# Patient Record
Sex: FEMALE | Race: WHITE | HISPANIC OR LATINO | Employment: OTHER | URBAN - METROPOLITAN AREA
[De-identification: names, ages, dates, MRNs, and addresses within clinical notes are randomized per-mention and may not be internally consistent; named-entity substitution may affect disease eponyms.]

---

## 2020-09-11 ENCOUNTER — OFFICE VISIT (OUTPATIENT)
Dept: INTERNAL MEDICINE CLINIC | Facility: CLINIC | Age: 65
End: 2020-09-11
Payer: COMMERCIAL

## 2020-09-11 VITALS
WEIGHT: 167 LBS | HEIGHT: 61 IN | SYSTOLIC BLOOD PRESSURE: 118 MMHG | HEART RATE: 60 BPM | DIASTOLIC BLOOD PRESSURE: 78 MMHG | TEMPERATURE: 97.3 F | BODY MASS INDEX: 31.53 KG/M2

## 2020-09-11 DIAGNOSIS — E78.2 MIXED HYPERLIPIDEMIA: ICD-10-CM

## 2020-09-11 DIAGNOSIS — I10 HYPERTENSION, ESSENTIAL: ICD-10-CM

## 2020-09-11 DIAGNOSIS — L40.59 OTHER PSORIATIC ARTHROPATHY (HCC): ICD-10-CM

## 2020-09-11 DIAGNOSIS — E08.21 DIABETES MELLITUS DUE TO UNDERLYING CONDITION WITH DIABETIC NEPHROPATHY, WITHOUT LONG-TERM CURRENT USE OF INSULIN (HCC): ICD-10-CM

## 2020-09-11 DIAGNOSIS — F34.1 DYSTHYMIC DISORDER: ICD-10-CM

## 2020-09-11 DIAGNOSIS — K21.9 GASTROESOPHAGEAL REFLUX DISEASE WITHOUT ESOPHAGITIS: ICD-10-CM

## 2020-09-11 DIAGNOSIS — M05.40 RHEUMATOID MYOPATHY WITH RHEUMATOID ARTHRITIS (HCC): ICD-10-CM

## 2020-09-11 DIAGNOSIS — E11.65 UNCONTROLLED TYPE 2 DIABETES MELLITUS WITH HYPERGLYCEMIA (HCC): Primary | ICD-10-CM

## 2020-09-11 PROCEDURE — 99214 OFFICE O/P EST MOD 30 MIN: CPT | Performed by: INTERNAL MEDICINE

## 2020-09-11 RX ORDER — ATORVASTATIN CALCIUM 10 MG/1
TABLET, FILM COATED ORAL
COMMUNITY
Start: 2020-08-12 | End: 2020-09-11

## 2020-09-11 RX ORDER — LINAGLIPTIN 5 MG/1
TABLET, FILM COATED ORAL
COMMUNITY
Start: 2020-08-11 | End: 2020-10-22

## 2020-09-11 RX ORDER — HYDROCHLOROTHIAZIDE 25 MG/1
TABLET ORAL
COMMUNITY
Start: 2020-09-04 | End: 2020-12-03

## 2020-09-11 RX ORDER — GLIMEPIRIDE 1 MG/1
TABLET ORAL
COMMUNITY
Start: 2020-09-04 | End: 2020-10-22

## 2020-09-11 RX ORDER — OMEPRAZOLE 20 MG/1
20 CAPSULE, DELAYED RELEASE ORAL DAILY
COMMUNITY
End: 2020-10-22

## 2020-09-11 RX ORDER — ROSUVASTATIN CALCIUM 5 MG/1
5 TABLET, COATED ORAL DAILY
Qty: 30 TABLET | Refills: 1 | Status: SHIPPED | OUTPATIENT
Start: 2020-09-11 | End: 2020-10-22

## 2020-09-11 RX ORDER — OLMESARTAN MEDOXOMIL 40 MG/1
40 TABLET ORAL DAILY
COMMUNITY
Start: 2020-07-13 | End: 2021-01-14 | Stop reason: SDUPTHER

## 2020-09-11 NOTE — PROGRESS NOTES
Assessment/Plan:    BMI Counseling: Body mass index is 31 55 kg/m²  The BMI is above normal  Nutrition recommendations include decreasing portion sizes, encouraging healthy choices of fruits and vegetables and decreasing fast food intake  Exercise recommendations include moderate physical activity 150 minutes/week  1  Uncontrolled type 2 diabetes mellitus with hyperglycemia ([facility])  -     Comprehensive metabolic panel; Future    2  Diabetes mellitus due to underlying condition with diabetic nephropathy, without long-term current use of insulin ([facility] )    3  Mixed hyperlipidemia  -     rosuvastatin (CRESTOR) 5 mg tablet; Take 1 tablet (5 mg total) by mouth daily  -     Comprehensive metabolic panel; Future    4  Hypertension, essential    5  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[facility] [address] )    6  Gastroesophageal reflux disease without esophagitis    7  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[facility] [address] [address] )    8  Dysthymic disorder           Subjective:      Patient ID: Valentin Gamez is a 71 y o  female  Follow-up multiple medical problems to ensure the stable on current medications, blood test done on 09/04/2020-discussed with her      The following portions of the patient's history were reviewed and updated as appropriate: She  has a past medical history of Diabetes mellitus due to underlying condition with diabetic nephropathy ([address] ), Dysthymic disorder, Gastroesophageal reflux disease, Hyperlipidemia, Hypertension, essential, Obstructive sleep apnea (adult) (pediatric), Osteoporosis, 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[facility] [address] ), 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[address] ), Right knee pain, Sleep apnea, Tietze's disease, and Type II diabetes mellitus, uncontrolled ([address] )    She   Patient Active Problem List    Diagnosis Date Noted    Type II diabetes mellitus, uncontrolled ([address] )     Sleep apnea     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[address] )     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[address] )     Obstructive sleep apnea (adult) (pediatric)     Hypertension, essential     Hyperlipidemia     Gastroesophageal reflux disease     Dysthymic disorder     Osteoporosis     Diabetes mellitus due to underlying condition with diabetic nephropathy ([facility] [address] )      She  has a past surgical history that includes Hysterectomy and Cholecystectomy  Her family history includes Diabetes in her mother; Stroke in her father  She  reports that she has never smoked  She has never used smokeless tobacco  She reports that she does not drink alcohol or use drugs  Current Outpatient Medications   Medication Sig Dispense Refill    glimepiride (AMARYL) 1 mg tablet TK 1 T PO D      hydrochlorothiazide (HYDRODIURIL) 25 mg tablet TK 1 T PO QD      olmesartan (BENICAR) 40 mg tablet TK 1/2 T PO BID      Tradjenta 5 MG TABS TK 1 T PO QD      omeprazole (PriLOSEC) 20 mg delayed release capsule Take 20 mg by mouth daily      rosuvastatin (CRESTOR) 5 mg tablet Take 1 tablet (5 mg total) by mouth daily 30 tablet 1     No current facility-administered medications for this visit  Current Outpatient Medications on File Prior to Visit   Medication Sig    glimepiride (AMARYL) 1 mg tablet TK 1 T PO D    hydrochlorothiazide (HYDRODIURIL) 25 mg tablet TK 1 T PO QD    olmesartan (BENICAR) 40 mg tablet TK 1/2 T PO BID    Tradjenta 5 MG TABS TK 1 T PO QD    omeprazole (PriLOSEC) 20 mg delayed release capsule Take 20 mg by mouth daily    [DISCONTINUED] atorvastatin (LIPITOR) 10 mg tablet TK 1 T PO QD     No current facility-administered medications on file prior to visit  She is allergic to atorvastatin and penicillins       Review of Systems   Constitutional: Negative for chills and fever  HENT: Negative for congestion, ear pain and sore throat  Eyes: Negative for pain  Respiratory: Negative for cough and shortness of breath  Cardiovascular: Negative for chest pain and leg swelling     Gastrointestinal: Negative for abdominal pain, nausea and vomiting  Endocrine: Negative for polyuria  Genitourinary: Negative for difficulty urinating, frequency and urgency  Musculoskeletal: Positive for arthralgias and back pain  Skin: Negative for rash  Neurological: Negative for weakness and headaches  Psychiatric/Behavioral: Negative for sleep disturbance  The patient is not nervous/anxious  Objective:      /78 (BP Location: Right arm, Patient Position: Sitting)   Pulse 60   Temp (!) 97 3 °F (36 3 °C) (Skin)   Ht 5' 1" (1 549 m)   Wt 75 8 kg (167 lb)   BMI 31 55 kg/m²     Recent Results (from the past 1344 hour(s))   HEMOGLOBIN A1C    Collection Time: 08/03/20 10:31 AM   Result Value Ref Range    Hemoglobin A1C 8 5 (H) <5 7 %    eAG, EST AVG Glucose 197 (H) <154 mg/dL        Physical Exam  Constitutional:       Appearance: Normal appearance  HENT:      Head: Normocephalic  Right Ear: Tympanic membrane, ear canal and external ear normal       Left Ear: Tympanic membrane, ear canal and external ear normal       Nose: Nose normal  No congestion  Mouth/Throat:      Mouth: Mucous membranes are moist       Pharynx: Oropharynx is clear  No oropharyngeal exudate or posterior oropharyngeal erythema  Eyes:      Extraocular Movements: Extraocular movements intact  Conjunctiva/sclera: Conjunctivae normal       Pupils: Pupils are equal, round, and reactive to light  Neck:      Musculoskeletal: Normal range of motion and neck supple  Cardiovascular:      Rate and Rhythm: Normal rate and regular rhythm  Heart sounds: Normal heart sounds  No murmur  Pulmonary:      Effort: Pulmonary effort is normal       Breath sounds: Normal breath sounds  No wheezing or rales  Abdominal:      General: Bowel sounds are normal  There is no distension  Palpations: Abdomen is soft  Tenderness: There is no abdominal tenderness  Musculoskeletal: Normal range of motion  Right lower leg: No edema        Left lower leg: No edema    Lymphadenopathy:      Cervical: No cervical adenopathy  Skin:     General: Skin is warm  Neurological:      General: No focal deficit present  Mental Status: She is alert and oriented to person, place, and time

## 2020-09-22 ENCOUNTER — TELEPHONE (OUTPATIENT)
Dept: INTERNAL MEDICINE CLINIC | Facility: CLINIC | Age: 65
End: 2020-09-22

## 2020-09-22 DIAGNOSIS — H65.00 ACUTE SEROUS OTITIS MEDIA, RECURRENCE NOT SPECIFIED, UNSPECIFIED LATERALITY: Primary | ICD-10-CM

## 2020-09-22 RX ORDER — AZITHROMYCIN 250 MG/1
TABLET, FILM COATED ORAL
Qty: 6 TABLET | Refills: 0 | Status: SHIPPED | OUTPATIENT
Start: 2020-09-22 | End: 2020-09-27

## 2020-09-22 NOTE — TELEPHONE ENCOUNTER
Pt c/o Right ear pain since yesterday, no fever, no congestion  She states she has no transportation to come in to the office for an appt  She would like to get something called in to the pharmacy like and antibiotic or pain medication

## 2020-10-22 ENCOUNTER — OFFICE VISIT (OUTPATIENT)
Dept: INTERNAL MEDICINE CLINIC | Facility: CLINIC | Age: 65
End: 2020-10-22
Payer: COMMERCIAL

## 2020-10-22 VITALS
WEIGHT: 166 LBS | OXYGEN SATURATION: 97 % | TEMPERATURE: 97.8 F | HEIGHT: 61 IN | DIASTOLIC BLOOD PRESSURE: 60 MMHG | HEART RATE: 67 BPM | SYSTOLIC BLOOD PRESSURE: 110 MMHG | BODY MASS INDEX: 31.34 KG/M2

## 2020-10-22 DIAGNOSIS — L40.59 OTHER PSORIATIC ARTHROPATHY (HCC): ICD-10-CM

## 2020-10-22 DIAGNOSIS — I10 HYPERTENSION, ESSENTIAL: ICD-10-CM

## 2020-10-22 DIAGNOSIS — E78.2 MIXED HYPERLIPIDEMIA: ICD-10-CM

## 2020-10-22 DIAGNOSIS — E08.21 DIABETES MELLITUS DUE TO UNDERLYING CONDITION WITH DIABETIC NEPHROPATHY, WITHOUT LONG-TERM CURRENT USE OF INSULIN (HCC): ICD-10-CM

## 2020-10-22 DIAGNOSIS — E11.65 UNCONTROLLED TYPE 2 DIABETES MELLITUS WITH HYPERGLYCEMIA (HCC): Primary | ICD-10-CM

## 2020-10-22 PROCEDURE — 99214 OFFICE O/P EST MOD 30 MIN: CPT | Performed by: INTERNAL MEDICINE

## 2020-11-28 LAB
CREAT ?TM UR-SCNC: 334 UMOL/L
EXT MICROALBUMIN URINE RANDOM: 4.8
HBA1C MFR BLD HPLC: 7.3 %
MICROALBUMIN/CREAT UR: 14.4 MG/G{CREAT}

## 2020-12-03 ENCOUNTER — OFFICE VISIT (OUTPATIENT)
Dept: INTERNAL MEDICINE CLINIC | Facility: CLINIC | Age: 65
End: 2020-12-03
Payer: COMMERCIAL

## 2020-12-03 VITALS
BODY MASS INDEX: 30.21 KG/M2 | HEART RATE: 61 BPM | SYSTOLIC BLOOD PRESSURE: 122 MMHG | HEIGHT: 61 IN | OXYGEN SATURATION: 97 % | TEMPERATURE: 98 F | DIASTOLIC BLOOD PRESSURE: 78 MMHG | WEIGHT: 160 LBS

## 2020-12-03 DIAGNOSIS — E11.65 UNCONTROLLED TYPE 2 DIABETES MELLITUS WITH HYPERGLYCEMIA (HCC): Primary | ICD-10-CM

## 2020-12-03 DIAGNOSIS — E78.2 MIXED HYPERLIPIDEMIA: ICD-10-CM

## 2020-12-03 DIAGNOSIS — Z28.21 PNEUMOCOCCAL VACCINATION DECLINED: ICD-10-CM

## 2020-12-03 DIAGNOSIS — Z28.21 INFLUENZA VACCINATION DECLINED: ICD-10-CM

## 2020-12-03 DIAGNOSIS — I10 HYPERTENSION, ESSENTIAL: ICD-10-CM

## 2020-12-03 PROCEDURE — 99214 OFFICE O/P EST MOD 30 MIN: CPT | Performed by: INTERNAL MEDICINE

## 2020-12-03 RX ORDER — ROSUVASTATIN CALCIUM 5 MG/1
TABLET, COATED ORAL
Qty: 90 TABLET | Refills: 0 | Status: SHIPPED | OUTPATIENT
Start: 2020-12-03 | End: 2021-01-14 | Stop reason: SDUPTHER

## 2020-12-03 RX ORDER — ASPIRIN 81 MG/1
81 TABLET ORAL DAILY
COMMUNITY

## 2020-12-03 RX ORDER — MULTIVIT WITH MINERALS/LUTEIN
1000 TABLET ORAL DAILY
COMMUNITY
End: 2021-07-14

## 2020-12-03 RX ORDER — ROSUVASTATIN CALCIUM 5 MG/1
5 TABLET, COATED ORAL DAILY
Qty: 30 TABLET | Refills: 1 | Status: SHIPPED | OUTPATIENT
Start: 2020-12-03 | End: 2020-12-03

## 2021-01-14 ENCOUNTER — OFFICE VISIT (OUTPATIENT)
Dept: INTERNAL MEDICINE CLINIC | Facility: CLINIC | Age: 66
End: 2021-01-14
Payer: COMMERCIAL

## 2021-01-14 VITALS
BODY MASS INDEX: 31.15 KG/M2 | WEIGHT: 165 LBS | OXYGEN SATURATION: 98 % | DIASTOLIC BLOOD PRESSURE: 78 MMHG | TEMPERATURE: 98 F | HEART RATE: 85 BPM | HEIGHT: 61 IN | SYSTOLIC BLOOD PRESSURE: 124 MMHG

## 2021-01-14 DIAGNOSIS — D64.9 ANEMIA, UNSPECIFIED TYPE: ICD-10-CM

## 2021-01-14 DIAGNOSIS — Z28.21 PNEUMOCOCCAL VACCINATION DECLINED: ICD-10-CM

## 2021-01-14 DIAGNOSIS — E78.2 MIXED HYPERLIPIDEMIA: Primary | ICD-10-CM

## 2021-01-14 DIAGNOSIS — M05.40 RHEUMATOID MYOPATHY WITH RHEUMATOID ARTHRITIS (HCC): ICD-10-CM

## 2021-01-14 DIAGNOSIS — Z11.59 ENCOUNTER FOR HEPATITIS C SCREENING TEST FOR LOW RISK PATIENT: ICD-10-CM

## 2021-01-14 DIAGNOSIS — Z00.00 MEDICARE ANNUAL WELLNESS VISIT, SUBSEQUENT: ICD-10-CM

## 2021-01-14 DIAGNOSIS — Z28.21 INFLUENZA VACCINATION DECLINED: ICD-10-CM

## 2021-01-14 DIAGNOSIS — I10 HYPERTENSION, ESSENTIAL: ICD-10-CM

## 2021-01-14 DIAGNOSIS — E11.65 UNCONTROLLED TYPE 2 DIABETES MELLITUS WITH HYPERGLYCEMIA (HCC): ICD-10-CM

## 2021-01-14 PROCEDURE — 99214 OFFICE O/P EST MOD 30 MIN: CPT | Performed by: INTERNAL MEDICINE

## 2021-01-14 PROCEDURE — G0439 PPPS, SUBSEQ VISIT: HCPCS | Performed by: INTERNAL MEDICINE

## 2021-01-14 RX ORDER — OLMESARTAN MEDOXOMIL 40 MG/1
40 TABLET ORAL DAILY
Qty: 90 TABLET | Refills: 0 | Status: SHIPPED | OUTPATIENT
Start: 2021-01-14 | End: 2021-01-19

## 2021-01-14 RX ORDER — ROSUVASTATIN CALCIUM 5 MG/1
5 TABLET, COATED ORAL DAILY
Qty: 90 TABLET | Refills: 0 | Status: SHIPPED | OUTPATIENT
Start: 2021-01-14 | End: 2021-04-15 | Stop reason: SDUPTHER

## 2021-01-14 NOTE — PATIENT INSTRUCTIONS
Medicare Preventive Visit Patient Instructions  Thank you for completing your Welcome to Medicare Visit or Medicare Annual Wellness Visit today  Your next wellness visit will be due in one year (1/14/2022)  The screening/preventive services that you may require over the next 5-10 years are detailed below  Some tests may not apply to you based off risk factors and/or age  Screening tests ordered at today's visit but not completed yet may show as past due  Also, please note that scanned in results may not display below  Preventive Screenings:  Service Recommendations Previous Testing/Comments   Colorectal Cancer Screening  * Colonoscopy    * Fecal Occult Blood Test (FOBT)/Fecal Immunochemical Test (FIT)  * Fecal DNA/Cologuard Test  * Flexible Sigmoidoscopy Age: 54-65 years old   Colonoscopy: every 10 years (may be performed more frequently if at higher risk)  OR  FOBT/FIT: every 1 year  OR  Cologuard: every 3 years  OR  Sigmoidoscopy: every 5 years  Screening may be recommended earlier than age 48 if at higher risk for colorectal cancer  Also, an individualized decision between you and your healthcare provider will decide whether screening between the ages of 74-80 would be appropriate  Colonoscopy: Not on file  FOBT/FIT: Not on file  Cologuard: Not on file  Sigmoidoscopy: Not on file         Breast Cancer Screening Age: 36 years old  Frequency: every 1-2 years  Not required if history of left and right mastectomy Mammogram: 07/21/2014       Cervical Cancer Screening Between the ages of 21-29, pap smear recommended once every 3 years  Between the ages of 33-67, can perform pap smear with HPV co-testing every 5 years     Recommendations may differ for women with a history of total hysterectomy, cervical cancer, or abnormal pap smears in past  Pap Smear: Not on file    Screening Not Indicated   Hepatitis C Screening Once for adults born between [facility]  More frequently in patients at high risk for Hepatitis C Hep C Antibody: Not on file       Diabetes Screening 1-2 times per year if you're at risk for diabetes or have pre-diabetes Fasting glucose: No results in last 5 years   A1C: 7 3 %    Screening Not Indicated  History Diabetes   Cholesterol Screening Once every 5 years if you don't have a lipid disorder  May order more often based on risk factors  Lipid panel: 08/03/2020    Screening Not Indicated  History Lipid Disorder     Other Preventive Screenings Covered by Medicare:  1  Abdominal Aortic Aneurysm (AAA) Screening: covered once if your at risk  You're considered to be at risk if you have a family history of AAA  2  Lung Cancer Screening: covers low dose CT scan once per year if you meet all of the following conditions: (1) Age 50-69; (2) No signs or symptoms of lung cancer; (3) Current smoker or have quit smoking within the last 15 years; (4) You have a tobacco smoking history of at least 30 pack years (packs per day multiplied by number of years you smoked); (5) You get a written order from a healthcare provider  3  Glaucoma Screening: covered annually if you're considered high risk: (1) You have diabetes OR (2) Family history of glaucoma OR (3)  aged 48 and older OR (3)  American aged 72 and older  3  Osteoporosis Screening: covered every 2 years if you meet one of the following conditions: (1) You're estrogen deficient and at risk for osteoporosis based off medical history and other findings; (2) Have a vertebral abnormality; (3) On glucocorticoid therapy for more than 3 months; (4) Have primary hyperparathyroidism; (5) On osteoporosis medications and need to assess response to drug therapy  · Last bone density test (DXA Scan): Not on file  5  HIV Screening: covered annually if you're between the age of 12-76  Also covered annually if you are younger than 13 and older than 72 with risk factors for HIV infection   For pregnant patients, it is covered up to 3 times per pregnancy  Immunizations:  Immunization Recommendations   Influenza Vaccine Annual influenza vaccination during flu season is recommended for all persons aged >= 6 months who do not have contraindications   Pneumococcal Vaccine (Prevnar and Pneumovax)  * Prevnar = PCV13  * Pneumovax = PPSV23   Adults 25-60 years old: 1-3 doses may be recommended based on certain risk factors  Adults 72 years old: Prevnar (PCV13) vaccine recommended followed by Pneumovax (PPSV23) vaccine  If already received PPSV23 since turning 65, then PCV13 recommended at least one year after PPSV23 dose  Hepatitis B Vaccine 3 dose series if at intermediate or high risk (ex: diabetes, end stage renal disease, liver disease)   Tetanus (Td) Vaccine - COST NOT COVERED BY MEDICARE PART B Following completion of primary series, a booster dose should be given every 10 years to maintain immunity against tetanus  Td may also be given as tetanus wound prophylaxis  Tdap Vaccine - COST NOT COVERED BY MEDICARE PART B Recommended at least once for all adults  For pregnant patients, recommended with each pregnancy  Shingles Vaccine (Shingrix) - COST NOT COVERED BY MEDICARE PART B  2 shot series recommended in those aged 48 and above     Health Maintenance Due:      Topic Date Due    Hepatitis C Screening  1955    Colorectal Cancer Screening  11/22/2004    MAMMOGRAM  07/21/2015     Immunizations Due:      Topic Date Due    DTaP,Tdap,and Td Vaccines (1 - Tdap) 11/22/1975    Influenza Vaccine (1) 09/01/2020     Advance Directives   What are advance directives? Advance directives are legal documents that state your wishes and plans for medical care  These plans are made ahead of time in case you lose your ability to make decisions for yourself  Advance directives can apply to any medical decision, such as the treatments you want, and if you want to donate organs  What are the types of advance directives?   There are many types of advance directives, and each state has rules about how to use them  You may choose a combination of any of the following:  · Living will: This is a written record of the treatment you want  You can also choose which treatments you do not want, which to limit, and which to stop at a certain time  This includes surgery, medicine, IV fluid, and tube feedings  · Durable power of  for healthcare [facility]): This is a written record that states who you want to make healthcare choices for you when you are unable to make them for yourself  This person, called a proxy, is usually a family member or a friend  You may choose more than 1 proxy  · Do not resuscitate (DNR) order:  A DNR order is used in case your heart stops beating or you stop breathing  It is a request not to have certain forms of treatment, such as CPR  A DNR order may be included in other types of advance directives  · Medical directive: This covers the care that you want if you are in a coma, near death, or unable to make decisions for yourself  You can list the treatments you want for each condition  Treatment may include pain medicine, surgery, blood transfusions, dialysis, IV or tube feedings, and a ventilator (breathing machine)  · Values history: This document has questions about your views, beliefs, and how you feel and think about life  This information can help others choose the care that you would choose  Why are advance directives important? An advance directive helps you control your care  Although spoken wishes may be used, it is better to have your wishes written down  Spoken wishes can be misunderstood, or not followed  Treatments may be given even if you do not want them  An advance directive may make it easier for your family to make difficult choices about your care     Weight Management   Why it is important to manage your weight:  Being overweight increases your risk of health conditions such as heart disease, high blood pressure, type 2 diabetes, and certain types of cancer  It can also increase your risk for osteoarthritis, sleep apnea, and other respiratory problems  Aim for a slow, steady weight loss  Even a small amount of weight loss can lower your risk of health problems  How to lose weight safely:  A safe and healthy way to lose weight is to eat fewer calories and get regular exercise  You can lose up about 1 pound a week by decreasing the number of calories you eat by 500 calories each day  Healthy meal plan for weight management:  A healthy meal plan includes a variety of foods, contains fewer calories, and helps you stay healthy  A healthy meal plan includes the following:  · Eat whole-grain foods more often  A healthy meal plan should contain fiber  Fiber is the part of grains, fruits, and vegetables that is not broken down by your body  Whole-grain foods are healthy and provide extra fiber in your diet  Some examples of whole-grain foods are whole-wheat breads and pastas, oatmeal, brown rice, and bulgur  · Eat a variety of vegetables every day  Include dark, leafy greens such as spinach, kale, rnean greens, and mustard greens  Eat yellow and orange vegetables such as carrots, sweet potatoes, and winter squash  · Eat a variety of fruits every day  Choose fresh or canned fruit (canned in its own juice or light syrup) instead of juice  Fruit juice has very little or no fiber  · Eat low-fat dairy foods  Drink fat-free (skim) milk or 1% milk  Eat fat-free yogurt and low-fat cottage cheese  Try low-fat cheeses such as mozzarella and other reduced-fat cheeses  · Choose meat and other protein foods that are low in fat  Choose beans or other legumes such as split peas or lentils  Choose fish, skinless poultry (chicken or turkey), or lean cuts of red meat (beef or pork)  Before you cook meat or poultry, cut off any visible fat  · Use less fat and oil  Try baking foods instead of frying them   Add less fat, such as margarine, sour cream, regular salad dressing and mayonnaise to foods  Eat fewer high-fat foods  Some examples of high-fat foods include french fries, doughnuts, ice cream, and cakes  · Eat fewer sweets  Limit foods and drinks that are high in sugar  This includes candy, cookies, regular soda, and sweetened drinks  Exercise:  Exercise at least 30 minutes per day on most days of the week  Some examples of exercise include walking, biking, dancing, and swimming  You can also fit in more physical activity by taking the stairs instead of the elevator or parking farther away from stores  Ask your healthcare provider about the best exercise plan for you  © Copyright [employer] 2018 Information is for End User's use only and may not be sold, redistributed or otherwise used for commercial purposes   All illustrations and images included in CareNotes® are the copyrighted property of A [employer]  or [address] [place] [address]

## 2021-01-14 NOTE — PROGRESS NOTES
Assessment and Plan:     Problem List Items Addressed This Visit        Endocrine    Type II diabetes mellitus, uncontrolled ([address] )       Cardiovascular and Mediastinum    Hypertension, essential       Musculoskeletal and Integument    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[address] )       Other    Hyperlipidemia - Primary    Pneumococcal vaccination declined    Influenza vaccination declined    Medicare annual wellness visit, subsequent           Preventive health issues were discussed with patient, and age appropriate screening tests were ordered as noted in patient's After Visit Summary  Personalized health advice and appropriate referrals for health education or preventive services given if needed, as noted in patient's After Visit Summary       History of Present Illness:     Patient presents for Medicare Annual Wellness visit    Patient Care Team:  Blaise Alcantar MD as PCP - General (Internal Medicine)  Blaise Alcantar MD as PCP - [address] [place], [place] [address]Th [facility]     Problem List:     Patient Active Problem List   Diagnosis    Type II diabetes mellitus, uncontrolled ([address] )    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[address] )    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[address] )    Hypertension, essential    Hyperlipidemia    Gastroesophageal reflux disease    Dysthymic disorder    Osteoporosis    Diabetes mellitus due to underlying condition with diabetic nephropathy ([address] )    Pneumococcal vaccination declined    Influenza vaccination declined    Medicare annual wellness visit, subsequent      Past Medical and Surgical History:     Past Medical History:   Diagnosis Date    Degenerative joint disease of knee     right more than left     Depression with anxiety     Diabetes mellitus due to underlying condition with diabetic nephropathy ([address] )     Dysthymic disorder     Esophageal reflux     Essential (primary) hypertension     Fibromyalgia     Gastroesophageal reflux disease     Hypercholesterolemia     Hyperlipidemia     Hypertension, essential     Influenza vaccination declined     Obstructive sleep apnea (adult) (pediatric)     Osteoporosis     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HCC)     Other specified disorders of white blood cells     Pneumococcal vaccination declined     Psoriatic arthropathy (HCC)     Rheumatoid arthritis (HCC)     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     Right knee pain     Sleep apnea     Tietze's disease     Type II diabetes mellitus, uncontrolled ([facility] )      Past Surgical History:   Procedure Laterality Date    CHOLECYSTECTOMY      COLONOSCOPY  01/12/2012    Abnormal     HYSTERECTOMY      MAMMO (HISTORICAL)  02/04/2016    Normal       Family History:     Family History   Problem Relation Age of Onset    Diabetes Mother     Stroke Father       Social History:        Social History     Socioeconomic History    Marital status: /Civil Union     Spouse name: None    Number of children: None    Years of education: None    Highest education level: None   Occupational History    None   Social Needs    Financial resource strain: None    Food insecurity     Worry: None     Inability: None    Transportation needs     Medical: None     Non-medical: None   Tobacco Use    Smoking status: Never Smoker    Smokeless tobacco: Never Used    Tobacco comment: Curernt every day smoker - As per [employer]    Substance and Sexual Activity    Alcohol use: Never     Frequency: Never    Drug use: Never    Sexual activity: None   Lifestyle    Physical activity     Days per week: None     Minutes per session: None    Stress: None   Relationships    Social connections     Talks on phone: None     Gets together: None     Attends Bahai service: None     Active member of club or organization: None     Attends meetings of clubs or organizations: None     Relationship status: None    Intimate partner violence     Fear of current or ex partner: None Emotionally abused: None     Physically abused: None     Forced sexual activity: None   Other Topics Concern    None   Social History Narrative    Travel outside [address]: No       Medications and Allergies:     Current Outpatient Medications   Medication Sig Dispense Refill    Ascorbic Acid (vitamin C) 1000 MG tablet Take 1,000 mg by mouth daily      aspirin (ECOTRIN LOW STRENGTH) 81 mg EC tablet Take 81 mg by mouth daily      metFORMIN (GLUCOPHAGE) 500 mg tablet Take 1 tablet (500 mg total) by mouth 2 (two) times a day with meals 180 tablet 1    olmesartan (BENICAR) 40 mg tablet Take 40 mg by mouth daily Take 1/2 tablet BID      rosuvastatin (CRESTOR) 5 mg tablet TAKE 1 TABLET(5 MG) BY MOUTH DAILY 90 tablet 0     No current facility-administered medications for this visit  Allergies   Allergen Reactions    Atorvastatin      Skin itchy    Crestor [Rosuvastatin]     Ezetimibe     Latex     Lisinopril     Methotrexate     Penicillins     Pravastatin     Shellfish Allergy       Immunizations: There is no immunization history for the selected administration types on file for this patient  Health Maintenance:         Topic Date Due    Hepatitis C Screening  1955    Colorectal Cancer Screening  11/22/2004    MAMMOGRAM  07/21/2015         Topic Date Due    DTaP,Tdap,and Td Vaccines (1 - Tdap) 11/22/1975    Influenza Vaccine (1) 09/01/2020      Medicare Health Risk Assessment:     /78 (BP Location: Right arm, Patient Position: Sitting, Cuff Size: Standard)   Pulse 85   Temp 98 °F (36 7 °C) (Temporal)   Ht 5' 1" (1 549 m)   Wt 74 8 kg (165 lb)   SpO2 98%   BMI 31 18 kg/m²      Dequan Clarke is here for her Subsequent Wellness visit  Health Risk Assessment:   Patient rates overall health as good  Patient feels that their physical health rating is same  Eyesight was rated as same  Hearing was rated as same  Patient feels that their emotional and mental health rating is same   Pain experienced in the last 7 days has been none  Patient states that she has experienced no weight loss or gain in last 6 months  Fall Risk Screening: In the past year, patient has experienced: no history of falling in past year      Urinary Incontinence Screening:   Patient has not leaked urine accidently in the last six months  Home Safety:  Patient has trouble with stairs inside or outside of their home  Patient has working smoke alarms and has working carbon monoxide detector  Home safety hazards include: none  Nutrition:   Current diet is Regular  Medications:   Patient is currently taking over-the-counter supplements  OTC medications include: see medication list  Patient is able to manage medications  Activities of Daily Living (ADLs)/Instrumental Activities of Daily Living (IADLs):   Walk and transfer into and out of bed and chair?: Yes  Dress and groom yourself?: Yes    Bathe or shower yourself?: Yes    Feed yourself? Yes  Do your laundry/housekeeping?: Yes  Manage your money, pay your bills and track your expenses?: Yes  Make your own meals?: Yes    Do your own shopping?: Yes    Previous Hospitalizations:   Any hospitalizations or ED visits within the last 12 months?: Yes    How many hospitalizations have you had in the last year?: 1-2    Advance Care Planning:   Living will: No    Durable POA for healthcare:  Yes    Advanced directive: No      PREVENTIVE SCREENINGS      Cardiovascular Screening:    General: Screening Not Indicated and History Lipid Disorder      Diabetes Screening:     General: Screening Not Indicated and History Diabetes      Cervical Cancer Screening:    General: Screening Not Indicated      Osteoporosis Screening:    General: Screening Not Indicated and History Osteoporosis      Lung Cancer Screening:     General: Screening Not Indicated      Jinny Ortega MD

## 2021-01-14 NOTE — PROGRESS NOTES
Assessment/Plan:    BMI Counseling: Body mass index is 31 18 kg/m²  The BMI is above normal  Nutrition recommendations include decreasing portion sizes, encouraging healthy choices of fruits and vegetables and decreasing fast food intake  Exercise recommendations include moderate physical activity 150 minutes/week  1  Mixed hyperlipidemia  -     TSH, 3rd generation; Future  -     Lipid Panel with Direct LDL reflex; Future  -     rosuvastatin (CRESTOR) 5 mg tablet; Take 1 tablet (5 mg total) by mouth daily    2  Medicare annual wellness visit, subsequent    3  Uncontrolled type 2 diabetes mellitus with hyperglycemia (HCC)  -     CBC and differential; Future  -     Comprehensive metabolic panel; Future  -     Hemoglobin A1C; Future  -     TSH, 3rd generation; Future  -     Microalbumin / creatinine urine ratio  -     Lipid Panel with Direct LDL reflex; Future  -     metFORMIN (GLUCOPHAGE) 500 mg tablet; Take 1 tablet (500 mg total) by mouth 2 (two) times a day with meals    4  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[facility] )    5  Hypertension, essential  -     olmesartan (BENICAR) 40 mg tablet; Take 1 tablet (40 mg total) by mouth daily Take 1/2 tablet BID    6  Pneumococcal vaccination declined    7  Influenza vaccination declined    8  Body mass index 31 0-31 9, adult    9  Encounter for hepatitis C screening test for low risk patient  -     Hepatitis C antibody; Future    10  Anemia, unspecified type  -     ABO/Rh; Future           Subjective:      Patient ID: Dominique Cotto is a 76 y o  female      Follow-up on blood test done on 01/08/2021-discussed with her, tolerating well the cholesterol pill, no muscle pain      The following portions of the patient's history were reviewed and updated as appropriate: She  has a past medical history of Degenerative joint disease of knee, Depression with anxiety, Diabetes mellitus due to underlying condition with diabetic nephropathy ([facility] ), Dysthymic disorder, Esophageal reflux, Essential (primary) hypertension, Fibromyalgia, Gastroesophageal reflux disease, Hypercholesterolemia, Hyperlipidemia, Hypertension, essential, Influenza vaccination declined, Obstructive sleep apnea (adult) (pediatric), Osteoporosis, 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[facility] ), Other specified disorders of white blood cells, Pneumococcal vaccination declined, Psoriatic arthropathy ([facility] ), Rheumatoid arthritis ([facility] ), 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[place] [address] ), Right knee pain, Sleep apnea, Tietze's disease, and Type II diabetes mellitus, uncontrolled ([facility] )  She   Patient Active Problem List    Diagnosis Date Noted    Medicare annual wellness visit, subsequent 01/14/2021    Body mass index 31 0-31 9, adult 01/14/2021    Pneumococcal vaccination declined     Influenza vaccination declined     Type II diabetes mellitus, uncontrolled ([place] [address] )     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[place] [address] )     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[place] [address] )     Hypertension, essential     Hyperlipidemia     Gastroesophageal reflux disease     Dysthymic disorder     Osteoporosis     Diabetes mellitus due to underlying condition with diabetic nephropathy ([place] [address] )      She  has a past surgical history that includes Hysterectomy; Cholecystectomy; Colonoscopy (01/12/2012); and Mammo (historical) (02/04/2016)  Her family history includes Diabetes in her mother; Stroke in her father  She  reports that she has never smoked  She has never used smokeless tobacco  She reports that she does not drink alcohol or use drugs    Current Outpatient Medications   Medication Sig Dispense Refill    Ascorbic Acid (vitamin C) 1000 MG tablet Take 1,000 mg by mouth daily      aspirin (ECOTRIN LOW STRENGTH) 81 mg EC tablet Take 81 mg by mouth daily      metFORMIN (GLUCOPHAGE) 500 mg tablet Take 1 tablet (500 mg total) by mouth 2 (two) times a day with meals 180 tablet 1    olmesartan (BENICAR) 40 mg tablet Take 1 tablet (40 mg total) by mouth daily Take 1/2 tablet BID 90 tablet 0    rosuvastatin (CRESTOR) 5 mg tablet Take 1 tablet (5 mg total) by mouth daily 90 tablet 0     No current facility-administered medications for this visit  Current Outpatient Medications on File Prior to Visit   Medication Sig    Ascorbic Acid (vitamin C) 1000 MG tablet Take 1,000 mg by mouth daily    aspirin (ECOTRIN LOW STRENGTH) 81 mg EC tablet Take 81 mg by mouth daily    [DISCONTINUED] metFORMIN (GLUCOPHAGE) 500 mg tablet Take 1 tablet (500 mg total) by mouth 2 (two) times a day with meals    [DISCONTINUED] olmesartan (BENICAR) 40 mg tablet Take 40 mg by mouth daily Take 1/2 tablet BID    [DISCONTINUED] rosuvastatin (CRESTOR) 5 mg tablet TAKE 1 TABLET(5 MG) BY MOUTH DAILY     No current facility-administered medications on file prior to visit  She is allergic to atorvastatin; crestor [rosuvastatin]; ezetimibe; latex; lisinopril; methotrexate; penicillins; pravastatin; and shellfish allergy       Review of Systems   Constitutional: Negative for chills and fever  HENT: Negative for congestion, ear pain and sore throat  Eyes: Negative for pain  Respiratory: Negative for cough and shortness of breath  Cardiovascular: Negative for chest pain and leg swelling  Gastrointestinal: Negative for abdominal pain, nausea and vomiting  Endocrine: Negative for polyuria  Genitourinary: Negative for difficulty urinating, frequency and urgency  Musculoskeletal: Positive for arthralgias  Negative for back pain  Skin: Negative for rash  Neurological: Negative for weakness and headaches  Psychiatric/Behavioral: Negative for sleep disturbance  The patient is not nervous/anxious            Objective:      /78 (BP Location: Right arm, Patient Position: Sitting, Cuff Size: Standard)   Pulse 85   Temp 98 °F (36 7 °C) (Temporal)   Ht 5' 1" (1 549 m)   Wt 74 8 kg (165 lb)   SpO2 98%   BMI 31 18 kg/m²     Recent Results (from the past 1344 hour(s))   Hemoglobin A1C    Collection Time: 11/28/20 12:00 AM   Result Value Ref Range    Hemoglobin A1C 7 3    Microalbumin / creatinine urine ratio    Collection Time: 11/28/20 12:00 AM   Result Value Ref Range    EXT Creatinine Urine 334 0     Microalbum  ,U,Random 4 8     EXTERNAL Microalb/Creat Ratio 14 4    HEMOGLOBIN A1C    Collection Time: 11/28/20  9:31 AM   Result Value Ref Range    Hemoglobin A1C 7 3 (H) <5 7 %    eAG, EST AVG Glucose 163 (H) <154 mg/dL        Physical Exam  Constitutional:       Appearance: Normal appearance  HENT:      Head: Normocephalic  Right Ear: Tympanic membrane, ear canal and external ear normal       Left Ear: Tympanic membrane, ear canal and external ear normal       Nose: Nose normal  No congestion  Mouth/Throat:      Mouth: Mucous membranes are moist       Pharynx: Oropharynx is clear  No oropharyngeal exudate or posterior oropharyngeal erythema  Eyes:      Extraocular Movements: Extraocular movements intact  Conjunctiva/sclera: Conjunctivae normal       Pupils: Pupils are equal, round, and reactive to light  Neck:      Musculoskeletal: Normal range of motion and neck supple  Cardiovascular:      Rate and Rhythm: Normal rate and regular rhythm  Heart sounds: Normal heart sounds  No murmur  Pulmonary:      Effort: Pulmonary effort is normal       Breath sounds: Normal breath sounds  No wheezing or rales  Abdominal:      General: Bowel sounds are normal  There is no distension  Palpations: Abdomen is soft  Tenderness: There is no abdominal tenderness  Musculoskeletal: Normal range of motion  Right lower leg: No edema  Left lower leg: No edema  Lymphadenopathy:      Cervical: No cervical adenopathy  Skin:     General: Skin is warm  Neurological:      General: No focal deficit present  Mental Status: She is alert and oriented to person, place, and time

## 2021-01-19 DIAGNOSIS — I10 HYPERTENSION, ESSENTIAL: ICD-10-CM

## 2021-01-19 RX ORDER — OLMESARTAN MEDOXOMIL 40 MG/1
TABLET ORAL
Qty: 90 TABLET | Refills: 0 | Status: SHIPPED | OUTPATIENT
Start: 2021-01-19 | End: 2021-04-15 | Stop reason: SDUPTHER

## 2021-01-28 ENCOUNTER — VBI (OUTPATIENT)
Dept: ADMINISTRATIVE | Facility: OTHER | Age: 66
End: 2021-01-28

## 2021-03-09 LAB
LEFT EYE DIABETIC RETINOPATHY: NORMAL
RIGHT EYE DIABETIC RETINOPATHY: NORMAL

## 2021-04-09 LAB
CREAT ?TM UR-SCNC: 250 UMOL/L
EXT MICROALBUMIN URINE RANDOM: 4.3
HBA1C MFR BLD HPLC: 8.6 %
HCV AB SER-ACNC: NEGATIVE
MICROALBUMIN/CREAT UR: 17.2 MG/G{CREAT}

## 2021-04-15 ENCOUNTER — OFFICE VISIT (OUTPATIENT)
Dept: INTERNAL MEDICINE CLINIC | Facility: CLINIC | Age: 66
End: 2021-04-15
Payer: COMMERCIAL

## 2021-04-15 ENCOUNTER — TELEPHONE (OUTPATIENT)
Dept: ADMINISTRATIVE | Facility: OTHER | Age: 66
End: 2021-04-15

## 2021-04-15 VITALS
WEIGHT: 167 LBS | HEART RATE: 73 BPM | HEIGHT: 61 IN | DIASTOLIC BLOOD PRESSURE: 78 MMHG | BODY MASS INDEX: 31.53 KG/M2 | SYSTOLIC BLOOD PRESSURE: 130 MMHG | TEMPERATURE: 98.4 F

## 2021-04-15 DIAGNOSIS — Z12.31 ENCOUNTER FOR SCREENING MAMMOGRAM FOR MALIGNANT NEOPLASM OF BREAST: ICD-10-CM

## 2021-04-15 DIAGNOSIS — E08.21 DIABETES MELLITUS DUE TO UNDERLYING CONDITION WITH DIABETIC NEPHROPATHY, WITHOUT LONG-TERM CURRENT USE OF INSULIN (HCC): ICD-10-CM

## 2021-04-15 DIAGNOSIS — E11.65 UNCONTROLLED TYPE 2 DIABETES MELLITUS WITH HYPERGLYCEMIA (HCC): Primary | ICD-10-CM

## 2021-04-15 DIAGNOSIS — E78.2 MIXED HYPERLIPIDEMIA: ICD-10-CM

## 2021-04-15 DIAGNOSIS — I10 HYPERTENSION, ESSENTIAL: ICD-10-CM

## 2021-04-15 DIAGNOSIS — M05.40 RHEUMATOID MYOPATHY WITH RHEUMATOID ARTHRITIS (HCC): ICD-10-CM

## 2021-04-15 PROCEDURE — 99214 OFFICE O/P EST MOD 30 MIN: CPT | Performed by: INTERNAL MEDICINE

## 2021-04-15 RX ORDER — PHENOL 1.4 %
AEROSOL, SPRAY (ML) MUCOUS MEMBRANE
COMMUNITY
End: 2021-07-14

## 2021-04-15 RX ORDER — ROSUVASTATIN CALCIUM 5 MG/1
5 TABLET, COATED ORAL DAILY
Qty: 90 TABLET | Refills: 0 | Status: SHIPPED | OUTPATIENT
Start: 2021-04-15 | End: 2021-07-14 | Stop reason: SDUPTHER

## 2021-04-15 RX ORDER — OLMESARTAN MEDOXOMIL 40 MG/1
20 TABLET ORAL 2 TIMES DAILY
Qty: 90 TABLET | Refills: 0 | Status: SHIPPED | OUTPATIENT
Start: 2021-04-15 | End: 2021-07-14 | Stop reason: SDUPTHER

## 2021-04-15 NOTE — LETTER
Diabetic Eye Exam Form  2nd Request    Date Requested: 21  Patient: Ruddy Hines  Patient : 1955   Referring Provider: Samira Ham MD    Dilated Retinal Exam, Optomap-Iris Exam, or Fundus Photography Done         Yes ([place] one above)         No     Date of Diabetic Eye Exam ______________________________  Left Eye      Exam did show retinopathy    Exam did not show retinopathy         Mild       Moderate       None       Proliferative       Severe     Right Eye     Exam did show retinopathy    Exam did not show retinopathy         Mild       Moderate       None       Proliferative       Severe     Comments __________________________________________________________    Practice Providing Exam ______________________________________________    Exam Performed By (print name) _______________________________________      Provider Signature ___________________________________________________      These reports are needed for  compliance    Please fax this completed form and a copy of the Diabetic Eye Exam report to our office located at [place] [address] as soon as possible to [phone] attention Mirian Bueno: Phone [phone]    We thank you for your assistance in treating our mutual patient

## 2021-04-15 NOTE — LETTER
Diabetic Eye Exam Form    Date Requested: 04/15/21  Patient: Kun Mallory  Patient : 1955   Referring Provider: Alber Silver MD    Dilated Retinal Exam, Optomap-Iris Exam, or Fundus Photography Done         Yes ([place] one above)         No     Date of Diabetic Eye Exam ______________________________  Left Eye      Exam did show retinopathy    Exam did not show retinopathy         Mild       Moderate       None       Proliferative       Severe     Right Eye     Exam did show retinopathy    Exam did not show retinopathy         Mild       Moderate       None       Proliferative       Severe     Comments __________________________________________________________    Practice Providing Exam ______________________________________________    Exam Performed By (print name) _______________________________________      Provider Signature ___________________________________________________      These reports are needed for  compliance    Please fax this completed form and a copy of the Diabetic Eye Exam report to our office located at [place] [address] as soon as possible to [phone] isak Danielletler: Phone [phone]    We thank you for your assistance in treating our mutual patient Abdomen soft, non-tender, no guarding. dry emesis on his shirt

## 2021-04-15 NOTE — TELEPHONE ENCOUNTER
----- Message from Burrell Spatz sent at 4/15/2021 10:03 AM EDT -----  Regarding: DM EYE  04/15/21 10:04 AM    Hello, our patient Hilary Lorenzo has had Diabetic Eye Exam completed/performed  Please assist in updating the patient chart by making an External outreach to [facility] facility located in [address] [place] [address]  The date of service is recent      Thank you,  Isacc Napoles  [address]

## 2021-04-15 NOTE — PROGRESS NOTES
Assessment/Plan:             1  Uncontrolled type 2 diabetes mellitus with hyperglycemia (HCC)  -     Hepatic function panel; Future  -     metFORMIN (GLUCOPHAGE) 1000 MG tablet; Take 1 tablet (1,000 mg total) by mouth 2 (two) times a day with meals    2  Diabetes mellitus due to underlying condition with diabetic nephropathy, without long-term current use of insulin ([address] )    3  Hypertension, essential  -     olmesartan (BENICAR) 40 mg tablet; Take 0 5 tablets (20 mg total) by mouth 2 (two) times a day    4  Mixed hyperlipidemia  -     rosuvastatin (CRESTOR) 5 mg tablet; Take 1 tablet (5 mg total) by mouth daily    5  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[address] )    6  Encounter for screening mammogram for malignant neoplasm of breast  -     Mammo screening bilateral w 3d & cad; Future; Expected date: 04/15/2021           Subjective:      Patient ID: Margaret Romero is a 76 y o  female  Follow-up on blood test done on 04/09/2021-discussed with her      The following portions of the patient's history were reviewed and updated as appropriate: She  has a past medical history of Degenerative joint disease of knee, Depression with anxiety, Diabetes mellitus due to underlying condition with diabetic nephropathy ([address] ), Dysthymic disorder, Esophageal reflux, Essential (primary) hypertension, Fibromyalgia, Gastroesophageal reflux disease, Hypercholesterolemia, Hyperlipidemia, Hypertension, essential, Influenza vaccination declined, Obstructive sleep apnea (adult) (pediatric), Osteoporosis, 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[address] ), Other specified disorders of white blood cells, Pneumococcal vaccination declined, Psoriatic arthropathy ([address] ), Rheumatoid arthritis ([address] ), 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[address] ), Right knee pain, Sleep apnea, Tietze's disease, and Type II diabetes mellitus, uncontrolled ([address] )    She   Patient Active Problem List    Diagnosis Date Noted    Medicare annual wellness visit, subsequent 01/14/2021    Body mass index 31 0-31 9, adult 01/14/2021    Pneumococcal vaccination declined     Influenza vaccination declined     Type II diabetes mellitus, uncontrolled ([place] [address] )     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[place] [address] )     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[place] [address] )     Hypertension, essential     Hyperlipidemia     Gastroesophageal reflux disease     Dysthymic disorder     Osteoporosis     Diabetes mellitus due to underlying condition with diabetic nephropathy ([place] [address] )      She  has a past surgical history that includes Hysterectomy; Cholecystectomy; Colonoscopy (01/12/2012); and Mammo (historical) (02/04/2016)  Her family history includes Diabetes in her mother; Stroke in her father  She  reports that she has never smoked  She has never used smokeless tobacco  She reports that she does not drink alcohol or use drugs  Current Outpatient Medications   Medication Sig Dispense Refill    Ascorbic Acid (vitamin C) 1000 MG tablet Take 1,000 mg by mouth daily      aspirin (ECOTRIN LOW STRENGTH) 81 mg EC tablet Take 81 mg by mouth daily      Coenzyme Q10 (Co Q 10) 100 MG CAPS Take by mouth      Melatonin 10 MG TABS Take by mouth      olmesartan (BENICAR) 40 mg tablet Take 0 5 tablets (20 mg total) by mouth 2 (two) times a day 90 tablet 0    metFORMIN (GLUCOPHAGE) 1000 MG tablet Take 1 tablet (1,000 mg total) by mouth 2 (two) times a day with meals 180 tablet 1    rosuvastatin (CRESTOR) 5 mg tablet Take 1 tablet (5 mg total) by mouth daily 90 tablet 0     No current facility-administered medications for this visit        Current Outpatient Medications on File Prior to Visit   Medication Sig    Ascorbic Acid (vitamin C) 1000 MG tablet Take 1,000 mg by mouth daily    aspirin (ECOTRIN LOW STRENGTH) 81 mg EC tablet Take 81 mg by mouth daily    Coenzyme Q10 (Co Q 10) 100 MG CAPS Take by mouth    Melatonin 10 MG TABS Take by mouth    [DISCONTINUED] metFORMIN (GLUCOPHAGE) 500 mg tablet Take 1 tablet (500 mg total) by mouth 2 (two) times a day with meals    [DISCONTINUED] olmesartan (BENICAR) 40 mg tablet TAKE 1/2 TABLET BY MOUTH TWICE DAILY    [DISCONTINUED] rosuvastatin (CRESTOR) 5 mg tablet Take 1 tablet (5 mg total) by mouth daily (Patient not taking: Reported on 4/15/2021)     No current facility-administered medications on file prior to visit  She is allergic to atorvastatin; crestor [rosuvastatin]; ezetimibe; latex; lisinopril; methotrexate; penicillins; pravastatin; and shellfish allergy - food allergy       Review of Systems   Constitutional: Negative for chills and fever  HENT: Negative for congestion, ear pain and sore throat  Eyes: Negative for pain  Respiratory: Negative for cough and shortness of breath  Cardiovascular: Negative for chest pain and leg swelling  Gastrointestinal: Negative for abdominal pain, nausea and vomiting  Endocrine: Negative for polyuria  Genitourinary: Negative for difficulty urinating, frequency and urgency  Musculoskeletal: Positive for arthralgias  Negative for back pain  Skin: Negative for rash  Neurological: Negative for weakness and headaches  Psychiatric/Behavioral: Negative for sleep disturbance  The patient is not nervous/anxious  Objective:      /78   Pulse 73   Temp 98 4 °F (36 9 °C)   Ht 5' 1" (1 549 m)   Wt 75 8 kg (167 lb)   BMI 31 55 kg/m²     Recent Results (from the past 1344 hour(s))   HEMOGLOBIN A1C    Collection Time: 04/09/21  8:46 AM   Result Value Ref Range    Hemoglobin A1C 8 6 (H) <5 7 %    eAG, EST AVG Glucose 200 (H) <154 mg/dL        Physical Exam  Constitutional:       Appearance: Normal appearance  HENT:      Head: Normocephalic  Right Ear: Tympanic membrane, ear canal and external ear normal       Left Ear: Tympanic membrane, ear canal and external ear normal       Nose: Nose normal  No congestion        Mouth/Throat:      Mouth: Mucous membranes are moist       Pharynx: Oropharynx is clear  No oropharyngeal exudate or posterior oropharyngeal erythema  Eyes:      Extraocular Movements: Extraocular movements intact  Conjunctiva/sclera: Conjunctivae normal       Pupils: Pupils are equal, round, and reactive to light  Neck:      Musculoskeletal: Normal range of motion and neck supple  Cardiovascular:      Rate and Rhythm: Normal rate and regular rhythm  Heart sounds: Normal heart sounds  No murmur  Pulmonary:      Effort: Pulmonary effort is normal       Breath sounds: Normal breath sounds  No wheezing or rales  Abdominal:      General: Bowel sounds are normal  There is no distension  Palpations: Abdomen is soft  Tenderness: There is no abdominal tenderness  Musculoskeletal: Normal range of motion  Right lower leg: No edema  Left lower leg: No edema  Lymphadenopathy:      Cervical: No cervical adenopathy  Skin:     General: Skin is warm  Neurological:      General: No focal deficit present  Mental Status: She is alert and oriented to person, place, and time

## 2021-04-15 NOTE — PROGRESS NOTES
73Diabetic Foot Exam    Patient's shoes and socks removed  Right Foot/Ankle   Right Foot Inspection  Skin Exam: skin normal and skin intact no dry skin, no warmth, no callus, no erythema, no maceration, no abnormal color, no pre-ulcer, no ulcer and no callus                          Toe Exam: ROM and strength within normal limits  Sensory       Monofilament testing: intact  Vascular  Capillary refills: < 3 seconds  The right DP pulse is 2+  The right PT pulse is 2+  Left Foot/Ankle  Left Foot Inspection  Skin Exam: skin normal and skin intactno dry skin, no warmth, no erythema, no maceration, normal color, no pre-ulcer, no ulcer and no callus                         Toe Exam: ROM and strength within normal limits                   Sensory       Monofilament: intact  Vascular  Capillary refills: < 3 seconds  The left DP pulse is 2+  The left PT pulse is 2+  Assign Risk Category:  No deformity present; No loss of protective sensation;  No weak pulses       Risk: 0

## 2021-04-15 NOTE — TELEPHONE ENCOUNTER
Upon review of the In Basket request and the patient's chart, initial outreach has been made via fax, please see Contacts section for details       Thank you  Ronn List

## 2021-04-19 NOTE — TELEPHONE ENCOUNTER
As a follow-up, a second attempt has been made for outreach via fax, please see Contacts section for details      Thank you  Jesenia Cotter

## 2021-04-22 NOTE — TELEPHONE ENCOUNTER
As a final attempt, a third outreach has been made via telephone call  Please see Contacts section for details  This encounter will be closed and completed by end of day  Should we receive the requested information because of previous outreach attempts, the requested patient's chart will be updated appropriately       Thank you  Elizabeth Hooker

## 2021-07-14 ENCOUNTER — OFFICE VISIT (OUTPATIENT)
Dept: INTERNAL MEDICINE CLINIC | Facility: CLINIC | Age: 66
End: 2021-07-14
Payer: COMMERCIAL

## 2021-07-14 VITALS
BODY MASS INDEX: 30.21 KG/M2 | HEIGHT: 61 IN | WEIGHT: 160 LBS | HEART RATE: 68 BPM | TEMPERATURE: 97.9 F | DIASTOLIC BLOOD PRESSURE: 72 MMHG | SYSTOLIC BLOOD PRESSURE: 112 MMHG

## 2021-07-14 DIAGNOSIS — E08.21 DIABETES MELLITUS DUE TO UNDERLYING CONDITION WITH DIABETIC NEPHROPATHY, WITHOUT LONG-TERM CURRENT USE OF INSULIN (HCC): ICD-10-CM

## 2021-07-14 DIAGNOSIS — E11.65 UNCONTROLLED TYPE 2 DIABETES MELLITUS WITH HYPERGLYCEMIA (HCC): Primary | ICD-10-CM

## 2021-07-14 DIAGNOSIS — E78.2 MIXED HYPERLIPIDEMIA: ICD-10-CM

## 2021-07-14 DIAGNOSIS — I10 HYPERTENSION, ESSENTIAL: ICD-10-CM

## 2021-07-14 PROCEDURE — 99214 OFFICE O/P EST MOD 30 MIN: CPT | Performed by: INTERNAL MEDICINE

## 2021-07-14 RX ORDER — OLMESARTAN MEDOXOMIL 40 MG/1
20 TABLET ORAL 2 TIMES DAILY
Qty: 90 TABLET | Refills: 0 | Status: SHIPPED | OUTPATIENT
Start: 2021-07-14 | End: 2021-12-13 | Stop reason: SDUPTHER

## 2021-07-14 RX ORDER — OMEPRAZOLE 20 MG/1
20 CAPSULE, DELAYED RELEASE ORAL DAILY
COMMUNITY
End: 2021-07-14

## 2021-07-14 RX ORDER — ROSUVASTATIN CALCIUM 5 MG/1
5 TABLET, COATED ORAL DAILY
Qty: 90 TABLET | Refills: 0 | Status: SHIPPED | OUTPATIENT
Start: 2021-07-14 | End: 2021-09-16 | Stop reason: SDUPTHER

## 2021-07-14 NOTE — PROGRESS NOTES
Assessment/Plan:      Tobacco Cessation Counseling: Tobacco cessation counseling was provided  The patient is sincerely urged to quit consumption of tobacco  She is ready to quit tobacco            1  Uncontrolled type 2 diabetes mellitus with hyperglycemia (HCC)  -     CBC and differential; Future  -     Comprehensive metabolic panel; Future  -     Hemoglobin A1C; Future  -     Lipid Panel with Direct LDL reflex; Future  -     Microalbumin / creatinine urine ratio  -     TSH, 3rd generation; Future  -     metFORMIN (GLUCOPHAGE) 1000 MG tablet; Take 1 tablet (1,000 mg total) by mouth 2 (two) times a day with meals    2  Hypertension, essential  -     olmesartan (BENICAR) 40 mg tablet; Take 0 5 tablets (20 mg total) by mouth 2 (two) times a day    3  Mixed hyperlipidemia  -     rosuvastatin (CRESTOR) 5 mg tablet; Take 1 tablet (5 mg total) by mouth daily    4  Diabetes mellitus due to underlying condition with diabetic nephropathy, without long-term current use of insulin (HCC)           Subjective:      Patient ID: Rola Montano is a 76 y o  female      Follow-up on multiple medical problems to ensure they are stable on current medications      The following portions of the patient's history were reviewed and updated as appropriate: She  has a past medical history of Degenerative joint disease of knee, Depression with anxiety, Diabetes mellitus due to underlying condition with diabetic nephropathy ([address] ), Dysthymic disorder, Esophageal reflux, Essential (primary) hypertension, Fibromyalgia, Gastroesophageal reflux disease, Hypercholesterolemia, Hyperlipidemia, Hypertension, essential, Influenza vaccination declined, Obstructive sleep apnea (adult) (pediatric), Osteoporosis, 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[address] ), Other specified disorders of white blood cells, Pneumococcal vaccination declined, Psoriatic arthropathy ([address] ), Rheumatoid arthritis ([address] ), 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[address] ), Right knee pain, Sleep apnea, Tietze's disease, and Type II diabetes mellitus, uncontrolled ([place] [address] )  She   Patient Active Problem List    Diagnosis Date Noted    Medicare annual wellness visit, subsequent 01/14/2021    Body mass index 31 0-31 9, adult 01/14/2021    Pneumococcal vaccination declined     Influenza vaccination declined     Type II diabetes mellitus, uncontrolled ([place] [address] )     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[place] [address] )     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[place] [address] )     Hypertension, essential     Hyperlipidemia     Gastroesophageal reflux disease     Dysthymic disorder     Osteoporosis     Diabetes mellitus due to underlying condition with diabetic nephropathy ([place] [address] )      She  has a past surgical history that includes Hysterectomy; Cholecystectomy; Colonoscopy (01/12/2012); and Mammo (historical) (02/04/2016)  Her family history includes Diabetes in her mother; Stroke in her father  She  reports that she has been smoking cigarettes  She has been smoking about 0 25 packs per day  She has never used smokeless tobacco  She reports that she does not drink alcohol and does not use drugs  Current Outpatient Medications   Medication Sig Dispense Refill    aspirin (ECOTRIN LOW STRENGTH) 81 mg EC tablet Take 81 mg by mouth daily      metFORMIN (GLUCOPHAGE) 1000 MG tablet Take 1 tablet (1,000 mg total) by mouth 2 (two) times a day with meals 180 tablet 1    olmesartan (BENICAR) 40 mg tablet Take 0 5 tablets (20 mg total) by mouth 2 (two) times a day 90 tablet 0    rosuvastatin (CRESTOR) 5 mg tablet Take 1 tablet (5 mg total) by mouth daily 90 tablet 0     No current facility-administered medications for this visit       Current Outpatient Medications on File Prior to Visit   Medication Sig    aspirin (ECOTRIN LOW STRENGTH) 81 mg EC tablet Take 81 mg by mouth daily    [DISCONTINUED] metFORMIN (GLUCOPHAGE) 1000 MG tablet Take 1 tablet (1,000 mg total) by mouth 2 (two) times a day with meals    [DISCONTINUED] olmesartan (BENICAR) 40 mg tablet Take 0 5 tablets (20 mg total) by mouth 2 (two) times a day    [DISCONTINUED] rosuvastatin (CRESTOR) 5 mg tablet Take 1 tablet (5 mg total) by mouth daily    [DISCONTINUED] Ascorbic Acid (vitamin C) 1000 MG tablet Take 1,000 mg by mouth daily (Patient not taking: Reported on 7/14/2021)    [DISCONTINUED] Coenzyme Q10 (Co Q 10) 100 MG CAPS Take by mouth (Patient not taking: Reported on 7/14/2021)    [DISCONTINUED] cyanocobalamin (VITAMIN B-12) 1000 MCG tablet Take 1,000 mcg by mouth daily (Patient not taking: Reported on 7/14/2021)    [DISCONTINUED] Melatonin 10 MG TABS Take by mouth (Patient not taking: Reported on 7/14/2021)    [DISCONTINUED] omeprazole (PriLOSEC) 20 mg delayed release capsule Take 20 mg by mouth daily (Patient not taking: Reported on 7/14/2021)     No current facility-administered medications on file prior to visit  She is allergic to atorvastatin, crestor [rosuvastatin], ezetimibe, latex, lisinopril, methotrexate, penicillins, pravastatin, and shellfish allergy - food allergy       Review of Systems   Constitutional: Negative for chills and fever  HENT: Negative for congestion, ear pain and sore throat  Eyes: Negative for pain  Respiratory: Negative for cough and shortness of breath  Cardiovascular: Negative for chest pain and leg swelling  Gastrointestinal: Negative for abdominal pain, nausea and vomiting  Endocrine: Negative for polyuria  Genitourinary: Negative for difficulty urinating, frequency and urgency  Musculoskeletal: Negative for arthralgias and back pain  Skin: Negative for rash  Neurological: Negative for weakness and headaches  Psychiatric/Behavioral: Negative for sleep disturbance  The patient is not nervous/anxious            Objective:      /72 (BP Location: Right arm, Patient Position: Sitting)   Pulse 68   Temp 97 9 °F (36 6 °C)   Ht 5' 1" (1 549 m)   Wt 72 6 kg (160 lb)   BMI 30 23 kg/m²     No results found for this or any previous visit (from the past 1344 hour(s))  Physical Exam  Constitutional:       Appearance: Normal appearance  HENT:      Head: Normocephalic  Right Ear: Tympanic membrane, ear canal and external ear normal       Left Ear: Tympanic membrane, ear canal and external ear normal       Nose: Nose normal  No congestion  Mouth/Throat:      Mouth: Mucous membranes are moist       Pharynx: Oropharynx is clear  No oropharyngeal exudate or posterior oropharyngeal erythema  Eyes:      Extraocular Movements: Extraocular movements intact  Conjunctiva/sclera: Conjunctivae normal       Pupils: Pupils are equal, round, and reactive to light  Cardiovascular:      Rate and Rhythm: Normal rate and regular rhythm  Heart sounds: Normal heart sounds  No murmur heard  Pulmonary:      Effort: Pulmonary effort is normal       Breath sounds: Normal breath sounds  No wheezing or rales  Abdominal:      General: Bowel sounds are normal  There is no distension  Palpations: Abdomen is soft  Tenderness: There is no abdominal tenderness  Musculoskeletal:         General: Normal range of motion  Cervical back: Normal range of motion and neck supple  Right lower leg: No edema  Left lower leg: No edema  Lymphadenopathy:      Cervical: No cervical adenopathy  Skin:     General: Skin is warm  Neurological:      General: No focal deficit present  Mental Status: She is alert and oriented to person, place, and time

## 2021-08-31 ENCOUNTER — TELEPHONE (OUTPATIENT)
Dept: PREADMISSION TESTING | Facility: HOSPITAL | Age: 66
End: 2021-08-31

## 2021-09-09 ENCOUNTER — ANESTHESIA (OUTPATIENT)
Dept: ANESTHESIOLOGY | Facility: HOSPITAL | Age: 66
End: 2021-09-09

## 2021-09-09 ENCOUNTER — TELEPHONE (OUTPATIENT)
Dept: GASTROENTEROLOGY | Facility: HOSPITAL | Age: 66
End: 2021-09-09

## 2021-09-09 ENCOUNTER — ANESTHESIA EVENT (OUTPATIENT)
Dept: ANESTHESIOLOGY | Facility: HOSPITAL | Age: 66
End: 2021-09-09

## 2021-09-09 PROBLEM — F17.200 SMOKING: Status: ACTIVE | Noted: 2021-09-09

## 2021-09-09 PROBLEM — IMO0001 SMOKING: Status: ACTIVE | Noted: 2021-09-09

## 2021-09-09 PROBLEM — M19.90 ARTHRITIS: Status: ACTIVE | Noted: 2021-09-09

## 2021-09-09 RX ORDER — SODIUM CHLORIDE 9 MG/ML
125 INJECTION, SOLUTION INTRAVENOUS CONTINUOUS
Status: CANCELLED | OUTPATIENT
Start: 2021-09-09

## 2021-09-09 NOTE — ANESTHESIA PREPROCEDURE EVALUATION
Procedure:  PRE-OP ONLY    Relevant Problems   ANESTHESIA  chart reviewed and notes read      CARDIO   (+) Hyperlipidemia   (+) Hypertension, essential      ENDO  obesity   pre accu      A1c 8 6      GI/HEPATIC   (+) Gastroesophageal reflux disease      GYN  PM      MUSCULOSKELETAL   (+) Arthritis   (+) 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HCC)      NEURO/PSYCH   (+) Dysthymic disorder      PULMONARY  smoking cessation stressed   (+) Obstructive sleep apnea syndrome   (+) Smoking   (-) URI (upper respiratory infection)        Physical Exam    Airway       Dental       Cardiovascular  Cardiovascular exam normal    Pulmonary  Pulmonary exam normal     Other Findings        Anesthesia Plan  ASA Score- 3     Anesthesia Type- IV sedation with anesthesia with ASA Monitors  Additional Monitors:   Airway Plan:           Plan Factors-Exercise tolerance (METS): >4 METS  Chart reviewed  EKG reviewed  Imaging results reviewed  Existing labs reviewed  Patient summary reviewed  Patient is a current smoker  Patient instructed to abstain from smoking on day of procedure  Patient did not smoke on day of surgery  Obstructive sleep apnea risk education given perioperatively  Induction- intravenous  Postoperative Plan-     Informed Consent- Anesthetic plan and risks discussed with patient  I personally reviewed this patient with the CRNA  Discussed and agreed on the Anesthesia Plan with the CRNA  John Renner

## 2021-09-10 ENCOUNTER — HOSPITAL ENCOUNTER (OUTPATIENT)
Dept: GASTROENTEROLOGY | Facility: HOSPITAL | Age: 66
Setting detail: OUTPATIENT SURGERY
Discharge: HOME | End: 2021-09-10
Attending: COLON & RECTAL SURGERY | Admitting: COLON & RECTAL SURGERY
Payer: COMMERCIAL

## 2021-09-10 ENCOUNTER — ANESTHESIA (OUTPATIENT)
Dept: GASTROENTEROLOGY | Facility: HOSPITAL | Age: 66
End: 2021-09-10

## 2021-09-10 ENCOUNTER — ANESTHESIA EVENT (OUTPATIENT)
Dept: GASTROENTEROLOGY | Facility: HOSPITAL | Age: 66
End: 2021-09-10

## 2021-09-10 VITALS
WEIGHT: 160 LBS | HEIGHT: 61 IN | OXYGEN SATURATION: 98 % | DIASTOLIC BLOOD PRESSURE: 73 MMHG | SYSTOLIC BLOOD PRESSURE: 109 MMHG | RESPIRATION RATE: 20 BRPM | TEMPERATURE: 96.2 F | HEART RATE: 74 BPM | BODY MASS INDEX: 30.21 KG/M2

## 2021-09-10 DIAGNOSIS — Z12.11 ENCOUNTER FOR SCREENING FOR MALIGNANT NEOPLASM OF COLON: ICD-10-CM

## 2021-09-10 DIAGNOSIS — Z86.010 PERSONAL HISTORY OF COLONIC POLYPS: ICD-10-CM

## 2021-09-10 LAB — GLUCOSE SERPL-MCNC: 158 MG/DL (ref 65–140)

## 2021-09-10 PROCEDURE — 82948 REAGENT STRIP/BLOOD GLUCOSE: CPT

## 2021-09-10 RX ORDER — PROPOFOL 10 MG/ML
INJECTION, EMULSION INTRAVENOUS AS NEEDED
Status: DISCONTINUED | OUTPATIENT
Start: 2021-09-10 | End: 2021-09-10

## 2021-09-10 RX ORDER — SODIUM CHLORIDE 9 MG/ML
125 INJECTION, SOLUTION INTRAVENOUS CONTINUOUS
Status: DISCONTINUED | OUTPATIENT
Start: 2021-09-10 | End: 2021-09-14 | Stop reason: HOSPADM

## 2021-09-10 RX ADMIN — PROPOFOL 120 MG: 10 INJECTION, EMULSION INTRAVENOUS at 08:23

## 2021-09-10 RX ADMIN — SODIUM CHLORIDE 125 ML/HR: 0.9 INJECTION, SOLUTION INTRAVENOUS at 06:54

## 2021-09-10 NOTE — ANESTHESIA POSTPROCEDURE EVALUATION
Post-Op Assessment Note    CV Status:  Stable  Pain Score: 0    Pain management: adequate     Mental Status:  Alert   Hydration Status:  Stable   PONV Controlled:  None   Airway Patency:  Patent   Two or more mitigation strategies used for obstructive sleep apnea    Staff: Anesthesiologist, with CRNAs         No complications documented      /79 (09/10/21 0847)    Temp (!) 96 2 °F (35 7 °C) (09/10/21 0847)    Pulse 74 (09/10/21 0847)   Resp 20 (09/10/21 0847)    SpO2 98 % (09/10/21 0847)

## 2021-09-10 NOTE — DISCHARGE INSTRUCTIONS
[facility] [address], [address] [place] [address] [address]  Phone: [phone]    DISCHARGE INSTRUCTIONS:    1   _x__ Complete Exam - Normal    2   ___ Exam normal, but entire colon not seen  We will discuss this with you  3   ___ Polyp(s) removed by "burning" - NO pathology report will follow    4   ___ Polyp(s) removed by excision  Pathology report will be available in 4-5 days   Someone from our office will call you with results  5   ___ Exam prompted biopsies  Pathology report will be available in 4-5 days   Someone from our office will call you with results  6   ___ Exam demonstrated findings that need treatment  Prescriptions will be   Given to you  Return to our office in ____ weeks  Please call for appt  7   ___ Original office visit or colonoscopy findings necessitate an office visit  Please call to set up a new appointment    8   ___ Medication  __________________________________________        [address]:    - Go straight home and rest today    - No driving or drinking alcohol for 24 hours    - Resume regular diet and medications unless otherwise instructed  Coumadin and Plavix are blood thinners  You can resume these medications on __________      IF YOU ARE HAVING ANY FEVER, BLEEDING OR PERSISTENT PAIN IN THE ABDOMEN, PLEASE CALL OUR OFFICE ANY DAY OR TIME  [phone]

## 2021-09-10 NOTE — INTERVAL H&P NOTE
H&P reviewed  After examining the patient I find no changes in the patients condition since the H&P had been written      Vitals:    09/10/21 0644   BP: (!) 88/54   Pulse: 87   Resp: 20   Temp: (!) 96 2 °F (35 7 °C)   SpO2: 97%

## 2021-09-10 NOTE — ANESTHESIA PREPROCEDURE EVALUATION
Procedure:  COLONOSCOPY    Relevant Problems   ANESTHESIA  chart reviewed and notes read      CARDIO   (+) Hyperlipidemia   (+) Hypertension, essential      ENDO  obesity   pre accu 158    A1c 8 6      GI/HEPATIC   (+) Gastroesophageal reflux disease      GYN  PM      MUSCULOSKELETAL   (+) Arthritis   (+) 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HCC)      NEURO/PSYCH   (+) Dysthymic disorder      PULMONARY  smoking cessation stressed   (+) Obstructive sleep apnea syndrome   (+) Smoking   (-) URI (upper respiratory infection)        Physical Exam    Airway    Mallampati score: III  TM Distance: >3 FB  Neck ROM: limited     Dental       Cardiovascular  Cardiovascular exam normal    Pulmonary  Pulmonary exam normal     Other Findings  Fixed upper and lower teeth and in good repair      Anesthesia Plan  ASA Score- 3     Anesthesia Type- IV sedation with anesthesia with ASA Monitors  Additional Monitors:   Airway Plan:     Comment: Pt has KIMO  Plan Factors-Exercise tolerance (METS): >4 METS  Chart reviewed  EKG reviewed  Imaging results reviewed  Existing labs reviewed  Patient summary reviewed  Patient is a current smoker  Patient instructed to abstain from smoking on day of procedure  Patient did not smoke on day of surgery  Obstructive sleep apnea risk education given perioperatively  Induction- intravenous  Postoperative Plan-     Informed Consent- Anesthetic plan and risks discussed with patient  I personally reviewed this patient with the CRNA  Discussed and agreed on the Anesthesia Plan with the CRNA  Citlaly Blanchard ambulatory

## 2021-09-16 ENCOUNTER — OFFICE VISIT (OUTPATIENT)
Dept: INTERNAL MEDICINE CLINIC | Facility: CLINIC | Age: 66
End: 2021-09-16
Payer: COMMERCIAL

## 2021-09-16 VITALS
BODY MASS INDEX: 30.21 KG/M2 | OXYGEN SATURATION: 96 % | TEMPERATURE: 97.5 F | SYSTOLIC BLOOD PRESSURE: 120 MMHG | WEIGHT: 160 LBS | DIASTOLIC BLOOD PRESSURE: 74 MMHG | HEIGHT: 61 IN | HEART RATE: 74 BPM

## 2021-09-16 DIAGNOSIS — E78.2 MIXED HYPERLIPIDEMIA: ICD-10-CM

## 2021-09-16 DIAGNOSIS — E11.65 UNCONTROLLED TYPE 2 DIABETES MELLITUS WITH HYPERGLYCEMIA (HCC): Primary | ICD-10-CM

## 2021-09-16 DIAGNOSIS — M17.0 PRIMARY OSTEOARTHRITIS OF BOTH KNEES: ICD-10-CM

## 2021-09-16 DIAGNOSIS — I10 HYPERTENSION, ESSENTIAL: ICD-10-CM

## 2021-09-16 DIAGNOSIS — E08.21 DIABETES MELLITUS DUE TO UNDERLYING CONDITION WITH DIABETIC NEPHROPATHY, WITHOUT LONG-TERM CURRENT USE OF INSULIN (HCC): ICD-10-CM

## 2021-09-16 PROCEDURE — 99213 OFFICE O/P EST LOW 20 MIN: CPT | Performed by: INTERNAL MEDICINE

## 2021-09-16 RX ORDER — ROSUVASTATIN CALCIUM 5 MG/1
5 TABLET, COATED ORAL DAILY
Qty: 90 TABLET | Refills: 0 | Status: SHIPPED | OUTPATIENT
Start: 2021-09-16 | End: 2022-03-03

## 2021-09-16 NOTE — PROGRESS NOTES
Assessment/Plan:             1  Uncontrolled type 2 diabetes mellitus with hyperglycemia ([address] )    2  Mixed hyperlipidemia  -     rosuvastatin (CRESTOR) 5 mg tablet; Take 1 tablet (5 mg total) by mouth daily    3  Hypertension, essential    4  Diabetes mellitus due to underlying condition with diabetic nephropathy, without long-term current use of insulin ([address] )    5  Primary osteoarthritis of both knees           Subjective:      Patient ID: Linda Whiting is a 76 y o  female  Follow-up on blood test done on 09/11/2021 test discussed with her      The following portions of the patient's history were reviewed and updated as appropriate: She  has a past medical history of Degenerative joint disease of knee, Depression with anxiety, Diabetes mellitus ([address] ), Diabetes mellitus due to underlying condition with diabetic nephropathy ([address] ), Dysthymic disorder, Esophageal reflux, Essential (primary) hypertension, Fibromyalgia, Gastroesophageal reflux disease, Hypercholesterolemia, Hyperlipidemia, Hypertension, essential, Influenza vaccination declined, Obstructive sleep apnea (adult) (pediatric), Osteoporosis, 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[address] ), Other specified disorders of white blood cells, Pneumococcal vaccination declined, Psoriatic arthropathy ([address] ), Rheumatoid arthritis ([address] ), 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[address] ), Right knee pain, Sleep apnea, Tietze's disease, and Type II diabetes mellitus, uncontrolled ([address] )    She   Patient Active Problem List    Diagnosis Date Noted    Primary osteoarthritis of both knees 09/16/2021    Smoking 09/09/2021    Arthritis 09/09/2021    Medicare annual wellness visit, subsequent 01/14/2021    Body mass index 31 0-31 9, adult 01/14/2021    Pneumococcal vaccination declined     Influenza vaccination declined     Type II diabetes mellitus, uncontrolled ([address] )     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[address] )     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[address] )     Obstructive sleep apnea syndrome     Hypertension, essential     Hyperlipidemia     Gastroesophageal reflux disease     Dysthymic disorder     Osteoporosis     Diabetes mellitus due to underlying condition with diabetic nephropathy, without long-term current use of insulin ([facility] )      She  has a past surgical history that includes Hysterectomy; Cholecystectomy; Colonoscopy (01/12/2012); and Mammo (historical) (02/04/2016)  Her family history includes Diabetes in her mother; Stroke in her father  She  reports that she has been smoking cigarettes  She has been smoking about 0 25 packs per day  She has never used smokeless tobacco  She reports that she does not drink alcohol and does not use drugs  Current Outpatient Medications   Medication Sig Dispense Refill    aspirin (ECOTRIN LOW STRENGTH) 81 mg EC tablet Take 81 mg by mouth daily      metFORMIN (GLUCOPHAGE) 1000 MG tablet Take 1 tablet (1,000 mg total) by mouth 2 (two) times a day with meals 180 tablet 1    olmesartan (BENICAR) 40 mg tablet Take 0 5 tablets (20 mg total) by mouth 2 (two) times a day 90 tablet 0    rosuvastatin (CRESTOR) 5 mg tablet Take 1 tablet (5 mg total) by mouth daily 90 tablet 0     No current facility-administered medications for this visit  Current Outpatient Medications on File Prior to Visit   Medication Sig    aspirin (ECOTRIN LOW STRENGTH) 81 mg EC tablet Take 81 mg by mouth daily    metFORMIN (GLUCOPHAGE) 1000 MG tablet Take 1 tablet (1,000 mg total) by mouth 2 (two) times a day with meals    olmesartan (BENICAR) 40 mg tablet Take 0 5 tablets (20 mg total) by mouth 2 (two) times a day    [DISCONTINUED] rosuvastatin (CRESTOR) 5 mg tablet Take 1 tablet (5 mg total) by mouth daily     No current facility-administered medications on file prior to visit       She is allergic to nuts - food allergy, atorvastatin, ezetimibe, latex, lisinopril, methotrexate, other, penicillins, pravastatin, and shellfish allergy - food allergy       Review of Systems   Constitutional: Negative for chills and fever  HENT: Negative for congestion, ear pain and sore throat  Eyes: Negative for pain  Respiratory: Negative for cough and shortness of breath  Cardiovascular: Negative for chest pain and leg swelling  Gastrointestinal: Negative for abdominal pain, nausea and vomiting  Endocrine: Negative for polyuria  Genitourinary: Negative for difficulty urinating, frequency and urgency  Musculoskeletal: Negative for arthralgias and back pain  Skin: Negative for rash  Neurological: Negative for weakness and headaches  Psychiatric/Behavioral: Negative for sleep disturbance  The patient is not nervous/anxious  Objective:      /74 (BP Location: Right arm, Patient Position: Sitting, Cuff Size: Standard)   Pulse 74   Temp 97 5 °F (36 4 °C) (Temporal)   Ht 5' 1" (1 549 m)   Wt 72 6 kg (160 lb)   SpO2 96%   BMI 30 23 kg/m²     Recent Results (from the past 1344 hour(s))   Fingerstick Glucose (POCT)    Collection Time: 09/10/21  6:57 AM   Result Value Ref Range    POC Glucose 158 (H) 65 - 140 mg/dl   Hemoglobin A1C    Collection Time: 09/11/21 12:00 AM   Result Value Ref Range    Hemoglobin A1C 7 9    Microalbumin / creatinine urine ratio    Collection Time: 09/11/21 12:00 AM   Result Value Ref Range    EXT Creatinine Urine 156 0     Microalbum  ,U,Random 5 2     EXTERNAL Microalb/Creat Ratio 33 3    HEMOGLOBIN A1C    Collection Time: 09/11/21  8:10 AM   Result Value Ref Range    Hemoglobin A1C 7 9 (H) <5 7 %    eAG, EST AVG Glucose 180 (H) <154 mg/dL        Physical Exam  Constitutional:       Appearance: Normal appearance  HENT:      Head: Normocephalic  Right Ear: Tympanic membrane, ear canal and external ear normal       Left Ear: Tympanic membrane, ear canal and external ear normal       Nose: Nose normal  No congestion        Mouth/Throat:      Mouth: Mucous membranes are moist       Pharynx: Oropharynx is clear  No oropharyngeal exudate or posterior oropharyngeal erythema  Eyes:      Extraocular Movements: Extraocular movements intact  Conjunctiva/sclera: Conjunctivae normal       Pupils: Pupils are equal, round, and reactive to light  Cardiovascular:      Rate and Rhythm: Normal rate and regular rhythm  Heart sounds: Normal heart sounds  No murmur heard  Pulmonary:      Effort: Pulmonary effort is normal       Breath sounds: Normal breath sounds  No wheezing or rales  Abdominal:      General: Bowel sounds are normal  There is no distension  Palpations: Abdomen is soft  Tenderness: There is no abdominal tenderness  Musculoskeletal:         General: Normal range of motion  Cervical back: Normal range of motion and neck supple  Right lower leg: No edema  Left lower leg: No edema  Lymphadenopathy:      Cervical: No cervical adenopathy  Skin:     General: Skin is warm  Neurological:      General: No focal deficit present  Mental Status: She is alert and oriented to person, place, and time     Psychiatric:         Mood and Affect: Mood normal          Behavior: Behavior normal

## 2021-10-14 ENCOUNTER — VBI (OUTPATIENT)
Dept: ADMINISTRATIVE | Facility: OTHER | Age: 66
End: 2021-10-14

## 2021-12-13 DIAGNOSIS — I10 HYPERTENSION, ESSENTIAL: ICD-10-CM

## 2021-12-13 RX ORDER — OLMESARTAN MEDOXOMIL 40 MG/1
20 TABLET ORAL 2 TIMES DAILY
Qty: 90 TABLET | Refills: 0 | Status: SHIPPED | OUTPATIENT
Start: 2021-12-13 | End: 2022-03-03 | Stop reason: SDUPTHER

## 2022-01-20 ENCOUNTER — TELEPHONE (OUTPATIENT)
Dept: INTERNAL MEDICINE CLINIC | Facility: CLINIC | Age: 67
End: 2022-01-20

## 2022-01-20 ENCOUNTER — OFFICE VISIT (OUTPATIENT)
Dept: INTERNAL MEDICINE CLINIC | Facility: CLINIC | Age: 67
End: 2022-01-20
Payer: COMMERCIAL

## 2022-01-20 VITALS
WEIGHT: 158 LBS | HEIGHT: 61 IN | BODY MASS INDEX: 29.83 KG/M2 | OXYGEN SATURATION: 98 % | HEART RATE: 66 BPM | DIASTOLIC BLOOD PRESSURE: 76 MMHG | TEMPERATURE: 97.2 F | SYSTOLIC BLOOD PRESSURE: 130 MMHG

## 2022-01-20 DIAGNOSIS — M17.0 PRIMARY OSTEOARTHRITIS OF BOTH KNEES: ICD-10-CM

## 2022-01-20 DIAGNOSIS — G50.0 TRIGEMINAL NEURALGIA OF LEFT SIDE OF FACE: Primary | ICD-10-CM

## 2022-01-20 DIAGNOSIS — E11.65 UNCONTROLLED TYPE 2 DIABETES MELLITUS WITH HYPERGLYCEMIA (HCC): ICD-10-CM

## 2022-01-20 DIAGNOSIS — Z00.00 MEDICARE ANNUAL WELLNESS VISIT, SUBSEQUENT: ICD-10-CM

## 2022-01-20 DIAGNOSIS — M05.40 RHEUMATOID MYOPATHY WITH RHEUMATOID ARTHRITIS (HCC): ICD-10-CM

## 2022-01-20 DIAGNOSIS — E08.21 DIABETES MELLITUS DUE TO UNDERLYING CONDITION WITH DIABETIC NEPHROPATHY, WITHOUT LONG-TERM CURRENT USE OF INSULIN (HCC): ICD-10-CM

## 2022-01-20 DIAGNOSIS — E78.2 MIXED HYPERLIPIDEMIA: ICD-10-CM

## 2022-01-20 DIAGNOSIS — L40.59 OTHER PSORIATIC ARTHROPATHY (HCC): ICD-10-CM

## 2022-01-20 DIAGNOSIS — I10 HYPERTENSION, ESSENTIAL: ICD-10-CM

## 2022-01-20 PROCEDURE — G0439 PPPS, SUBSEQ VISIT: HCPCS | Performed by: INTERNAL MEDICINE

## 2022-01-20 PROCEDURE — 99214 OFFICE O/P EST MOD 30 MIN: CPT | Performed by: INTERNAL MEDICINE

## 2022-01-20 NOTE — PROGRESS NOTES
Assessment and Plan:     Problem List Items Addressed This Visit        Endocrine    Type II diabetes mellitus, uncontrolled ([address] )    Relevant Orders    CBC and differential    Comprehensive metabolic panel    Hemoglobin A1C    Lipid Panel with Direct LDL reflex    Microalbumin / creatinine urine ratio    TSH, 3rd generation    Diabetes mellitus due to underlying condition with diabetic nephropathy, without long-term current use of insulin (HCC)       Cardiovascular and Mediastinum    Hypertension, essential       Nervous and Auditory    Trigeminal neuralgia of left side of face - Primary    Relevant Orders    MRI brain w wo contrast       Musculoskeletal and Integument    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HCC)    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HCC)    Primary osteoarthritis of both knees       Other    Mixed hyperlipidemia    Medicare annual wellness visit, subsequent           Preventive health issues were discussed with patient, and age appropriate screening tests were ordered as noted in patient's After Visit Summary  Personalized health advice and appropriate referrals for health education or preventive services given if needed, as noted in patient's After Visit Summary       History of Present Illness:     Patient presents for Medicare Annual Wellness visit    Patient Care Team:  Lorene Henry MD as PCP - General (Internal Medicine)  Lorene Henry MD as PCP - [address] [place], [place] [id number] [facility] ([facility])     Problem List:     Patient Active Problem List   Diagnosis    Type II diabetes mellitus, uncontrolled ([address] )    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[address] )    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[address] )    Obstructive sleep apnea syndrome    Hypertension, essential    Mixed hyperlipidemia    Gastroesophageal reflux disease    Dysthymic disorder    Osteoporosis    Diabetes mellitus due to underlying condition with diabetic nephropathy, without long-term current use of insulin (HCC)    Pneumococcal vaccination declined    Influenza vaccination declined    Medicare annual wellness visit, subsequent    Body mass index 31 0-31 9, adult    Smoking    Arthritis    Primary osteoarthritis of both knees    Trigeminal neuralgia of left side of face      Past Medical and Surgical History:     Past Medical History:   Diagnosis Date    Degenerative joint disease of knee     right more than left     Depression with anxiety     Diabetes [facility]     Diabetes mellitus due to underlying condition with diabetic nephropathy ([facility] )     Dysthymic disorder     Esophageal reflux     Essential (primary) hypertension     Fibromyalgia     Gastroesophageal reflux disease     Hypercholesterolemia     Hyperlipidemia     Hypertension, essential     Influenza vaccination declined     Obstructive sleep apnea (adult) (pediatric)     Osteoporosis     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[facility] )     Other specified disorders of white blood cells     Pneumococcal vaccination declined     Psoriatic arthropathy (HCC)     Rheumatoid arthritis ([facility]     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[facility]     Right knee pain     Sleep apnea     Tietze's disease     Type II diabetes mellitus, uncontrolled ([facility] )      Past Surgical History:   Procedure Laterality Date    CHOLECYSTECTOMY      COLONOSCOPY  01/12/2012    Abnormal     HYSTERECTOMY      MAMMO (HISTORICAL)  02/04/2016    Normal       Family History:     Family History   Problem Relation Age of Onset    Diabetes Mother     Stroke Father       Social History:     Social History     Socioeconomic History    Marital status: /Civil Union     Spouse name: Not on file    Number of children: Not on file    Years of education: Not on file    Highest education level: Not on file   Occupational History    Not on file   Tobacco Use    Smoking status: Current Every Day Smoker     Packs/day: 0 25     Types: Cigarettes    Smokeless tobacco: Never Used   Kristen Pall Tobacco comment: Curernt every day smoker - As per [employer]    Vaping Use    Vaping Use: Never used   Substance and Sexual Activity    Alcohol use: Never    Drug use: Never    Sexual activity: Not on file   Other Topics Concern    Not on file   Social History Narrative    Travel outside [place]: No      Social Determinants of Health     Financial Resource Strain: Not on file   Food Insecurity: Not on file   Transportation Needs: Not on file   Physical Activity: Not on file   Stress: Not on file   Social Connections: Not on file   Intimate Partner Violence: Not on file   Housing Stability: Not on file      Medications and Allergies:     Current Outpatient Medications   Medication Sig Dispense Refill    aspirin (ECOTRIN LOW STRENGTH) 81 mg EC tablet Take 81 mg by mouth daily      metFORMIN (GLUCOPHAGE) 1000 MG tablet Take 1 tablet (1,000 mg total) by mouth 2 (two) times a day with meals 180 tablet 1    olmesartan (BENICAR) 40 mg tablet Take 0 5 tablets (20 mg total) by mouth 2 (two) times a day 90 tablet 0    rosuvastatin (CRESTOR) 5 mg tablet Take 1 tablet (5 mg total) by mouth daily 90 tablet 0     No current facility-administered medications for this visit       Allergies   Allergen Reactions    Nuts - Food Allergy Anaphylaxis    Atorvastatin      Skin itchy    Ezetimibe     Latex     Lisinopril     Methotrexate     Other Abdominal Pain    Penicillins     Pravastatin     Shellfish Allergy - Food Allergy       Immunizations:     Immunization History   Administered Date(s) Administered    COVID-19 PFIZER VACCINE 0 3 ML IM 04/08/2021, 04/28/2021      Health Maintenance:         Topic Date Due    Breast Cancer Screening: Mammogram  07/21/2015    Colorectal Cancer Screening  09/09/2026    Hepatitis C Screening  Completed         Topic Date Due    Pneumococcal Vaccine: 65+ Years (1 of 2 - PPSV23) Never done    DTaP,Tdap,and Td Vaccines (1 - Tdap) Never done    Influenza Vaccine (1) Never done  COVID-19 Vaccine (3 - Booster for Pfizer series) 10/28/2021      Medicare Health Risk Assessment:     /76 (BP Location: Right arm, Patient Position: Sitting, Cuff Size: Standard)   Pulse 66   Temp (!) 97 2 °F (36 2 °C) (Temporal)   Ht 5' 1" (1 549 m)   Wt 71 7 kg (158 lb)   SpO2 98%   BMI 29 85 kg/m²      Sonja Salcedo is here for her Subsequent Wellness visit  Health Risk Assessment:   Patient rates overall health as good  Patient feels that their physical health rating is slightly better  Patient is very satisfied with their life  Eyesight was rated as slightly worse  Hearing was rated as slightly worse  Patient feels that their emotional and mental health rating is same  Patients states they are never, rarely angry  Patient states they are always unusually tired/fatigued  Pain experienced in the last 7 days has been none  Patient states that she has experienced no weight loss or gain in last 6 months  Depression Screening:   PHQ-9 Score: 8      Fall Risk Screening: In the past year, patient has experienced: no history of falling in past year      Urinary Incontinence Screening:   Patient has not leaked urine accidently in the last six months  Home Safety:  Patient does not have trouble with stairs inside or outside of their home  Patient has working smoke alarms and has working carbon monoxide detector  Home safety hazards include: none  Nutrition:   Current diet is Regular  Medications:   Patient is currently taking over-the-counter supplements  OTC medications include: see medication list  Patient is able to manage medications       Activities of Daily Living (ADLs)/Instrumental Activities of Daily Living (IADLs):   Walk and transfer into and out of bed and chair?: Yes  Dress and groom yourself?: Yes    Bathe or shower yourself?: Yes    Do your laundry/housekeeping?: Yes  Manage your money, pay your bills and track your expenses?: No  Make your own meals?: Yes    Do your own shopping?: No    Previous Hospitalizations:   Any hospitalizations or ED visits within the last 12 months?: Yes    How many hospitalizations have you had in the last year?: 1-2    Hospitalization Comments: Elevated BP    Advance Care Planning:   Living will: No    Durable POA for healthcare: No    Advanced directive: No      PREVENTIVE SCREENINGS      Cardiovascular Screening:    General: Screening Not Indicated and History Lipid Disorder      Diabetes Screening:     General: Screening Not Indicated and History Diabetes      Colorectal Cancer Screening:     General: Screening Current      Cervical Cancer Screening:    General: Screening Not Indicated      Osteoporosis Screening:    General: Screening Not Indicated and History Osteoporosis      Lung Cancer Screening:     General: Screening Not Indicated      Hepatitis C Screening:    General: Screening Current    Screening, Brief Intervention, and Referral to Treatment (SBIRT)    Screening  Typical number of drinks in a day: 0  Typical number of drinks in a week: 0  Interpretation: Low risk drinking behavior      Single Item Drug Screening:  How often have you used an illegal drug (including marijuana) or a prescription medication for non-medical reasons in the past year? never    Single Item Drug Screen Score: 0  Interpretation: Negative screen for possible drug use disorder      Alber Silver MD

## 2022-01-20 NOTE — PROGRESS NOTES
Assessment/Plan:    BMI Counseling: Body mass index is 29 85 kg/m²  The BMI is above normal  Nutrition recommendations include decreasing portion sizes, encouraging healthy choices of fruits and vegetables and decreasing fast food intake  Exercise recommendations include moderate physical activity 150 minutes/week  Rationale for BMI follow-up plan is due to patient being overweight or obese  1  Trigeminal neuralgia of left side of face  -     MRI brain w wo contrast; Future; Expected date: 01/20/2022    2  Medicare annual wellness visit, subsequent    3  Uncontrolled type 2 diabetes mellitus with hyperglycemia (HCC)  -     CBC and differential; Future  -     Comprehensive metabolic panel; Future  -     Hemoglobin A1C; Future  -     Lipid Panel with Direct LDL reflex; Future  -     Microalbumin / creatinine urine ratio  -     TSH, 3rd generation; Future    4  Mixed hyperlipidemia    5  Hypertension, essential    6  Primary osteoarthritis of both knees    7  Diabetes mellitus due to underlying condition with diabetic nephropathy, without long-term current use of insulin ([facility] )    8  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[facility] )    9  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HCC)           Subjective:      Patient ID: Angel Ledezma is a 78 y o  female      Left sides scalp pain, around the ear pain , pins and needles, also feel numb sometimes, also Medicare wellness exam      The following portions of the patient's history were reviewed and updated as appropriate: She  has a past medical history of Degenerative joint disease of knee, Depression with anxiety, Diabetes mellitus ([address] ), Diabetes mellitus due to underlying condition with diabetic nephropathy ([address] ), Dysthymic disorder, Esophageal reflux, Essential (primary) hypertension, Fibromyalgia, Gastroesophageal reflux disease, Hypercholesterolemia, Hyperlipidemia, Hypertension, essential, Influenza vaccination declined, Obstructive sleep apnea (adult) (pediatric), Osteoporosis, 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[facility] ), Other specified disorders of white blood cells, Pneumococcal vaccination declined, Psoriatic arthropathy ([facility] [address] ), Rheumatoid arthritis ([facility] [address] ), 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[facility] [address] ), Right knee pain, Sleep apnea, Tietze's disease, and Type II diabetes mellitus, uncontrolled ([facility] [address] )  She   Patient Active Problem List    Diagnosis Date Noted    Trigeminal neuralgia of left side of face 01/20/2022    Primary osteoarthritis of both knees 09/16/2021    Smoking 09/09/2021    Arthritis 09/09/2021    Medicare annual wellness visit, subsequent 01/14/2021    Body mass index 31 0-31 9, adult 01/14/2021    Pneumococcal vaccination declined     Influenza vaccination declined     Type II diabetes mellitus, uncontrolled ([facility] [address] )     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[facility] )     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[facility] )     Obstructive sleep apnea syndrome     Hypertension, essential     Mixed hyperlipidemia     Gastroesophageal reflux disease     Dysthymic disorder     Osteoporosis     Diabetes mellitus due to underlying condition with diabetic nephropathy, without long-term current use of insulin ([facility] )      She  has a past surgical history that includes Hysterectomy; Cholecystectomy; Colonoscopy (01/12/2012); and Mammo (historical) (02/04/2016)  Her family history includes Diabetes in her mother; Stroke in her father  She  reports that she has been smoking cigarettes  She has been smoking about 0 25 packs per day  She has never used smokeless tobacco  She reports that she does not drink alcohol and does not use drugs    Current Outpatient Medications   Medication Sig Dispense Refill    aspirin (ECOTRIN LOW STRENGTH) 81 mg EC tablet Take 81 mg by mouth daily      metFORMIN (GLUCOPHAGE) 1000 MG tablet Take 1 tablet (1,000 mg total) by mouth 2 (two) times a day with meals 180 tablet 1    olmesartan (BENICAR) 40 mg tablet Take 0 5 tablets (20 mg total) by mouth 2 (two) times a day 90 tablet 0    rosuvastatin (CRESTOR) 5 mg tablet Take 1 tablet (5 mg total) by mouth daily 90 tablet 0     No current facility-administered medications for this visit  Current Outpatient Medications on File Prior to Visit   Medication Sig    aspirin (ECOTRIN LOW STRENGTH) 81 mg EC tablet Take 81 mg by mouth daily    metFORMIN (GLUCOPHAGE) 1000 MG tablet Take 1 tablet (1,000 mg total) by mouth 2 (two) times a day with meals    olmesartan (BENICAR) 40 mg tablet Take 0 5 tablets (20 mg total) by mouth 2 (two) times a day    rosuvastatin (CRESTOR) 5 mg tablet Take 1 tablet (5 mg total) by mouth daily     No current facility-administered medications on file prior to visit  She is allergic to nuts - food allergy, atorvastatin, ezetimibe, latex, lisinopril, methotrexate, other, penicillins, pravastatin, and shellfish allergy - food allergy       Review of Systems   Constitutional: Negative for chills and fever  HENT: Negative for congestion, ear pain and sore throat  Eyes: Negative for pain  Respiratory: Negative for cough and shortness of breath  Cardiovascular: Negative for chest pain and leg swelling  Gastrointestinal: Negative for abdominal pain, nausea and vomiting  Endocrine: Negative for polyuria  Genitourinary: Negative for difficulty urinating, frequency and urgency  Musculoskeletal: Negative for arthralgias and back pain  Skin: Negative for rash  Neurological: Positive for headaches  Negative for weakness  Psychiatric/Behavioral: Negative for sleep disturbance  The patient is not nervous/anxious  Objective:      /76 (BP Location: Right arm, Patient Position: Sitting, Cuff Size: Standard)   Pulse 66   Temp (!) 97 2 °F (36 2 °C) (Temporal)   Ht 5' 1" (1 549 m)   Wt 71 7 kg (158 lb)   SpO2 98%   BMI 29 85 kg/m²     No results found for this or any previous visit (from the past 1344 hour(s))       Physical Exam  Constitutional:       Appearance: Normal appearance  HENT:      Head: Normocephalic  Right Ear: Tympanic membrane, ear canal and external ear normal       Left Ear: Tympanic membrane, ear canal and external ear normal       Nose: Nose normal  No congestion  Mouth/Throat:      Mouth: Mucous membranes are moist       Pharynx: Oropharynx is clear  No oropharyngeal exudate or posterior oropharyngeal erythema  Eyes:      Extraocular Movements: Extraocular movements intact  Conjunctiva/sclera: Conjunctivae normal       Pupils: Pupils are equal, round, and reactive to light  Cardiovascular:      Rate and Rhythm: Normal rate and regular rhythm  Heart sounds: Normal heart sounds  No murmur heard  Pulmonary:      Effort: Pulmonary effort is normal       Breath sounds: Normal breath sounds  No wheezing or rales  Abdominal:      General: Bowel sounds are normal  There is no distension  Palpations: Abdomen is soft  Tenderness: There is no abdominal tenderness  Musculoskeletal:         General: Normal range of motion  Cervical back: Normal range of motion and neck supple  Right lower leg: No edema  Left lower leg: No edema  Lymphadenopathy:      Cervical: No cervical adenopathy  Skin:     General: Skin is warm  Neurological:      General: No focal deficit present  Mental Status: She is alert and oriented to person, place, and time

## 2022-01-26 ENCOUNTER — TELEPHONE (OUTPATIENT)
Dept: INTERNAL MEDICINE CLINIC | Facility: CLINIC | Age: 67
End: 2022-01-26

## 2022-01-26 DIAGNOSIS — H60.313 ACUTE DIFFUSE OTITIS EXTERNA OF BOTH EARS: Primary | ICD-10-CM

## 2022-01-26 NOTE — TELEPHONE ENCOUNTER
Patient is still experiencing ear pain on her right side  She is asking if you can order drops or something to help her?

## 2022-02-03 ENCOUNTER — OFFICE VISIT (OUTPATIENT)
Dept: INTERNAL MEDICINE CLINIC | Facility: CLINIC | Age: 67
End: 2022-02-03
Payer: COMMERCIAL

## 2022-02-03 VITALS
HEIGHT: 61 IN | OXYGEN SATURATION: 97 % | TEMPERATURE: 97.8 F | HEART RATE: 84 BPM | BODY MASS INDEX: 30.4 KG/M2 | WEIGHT: 161 LBS | SYSTOLIC BLOOD PRESSURE: 124 MMHG | DIASTOLIC BLOOD PRESSURE: 80 MMHG

## 2022-02-03 DIAGNOSIS — H92.09 OTALGIA, UNSPECIFIED LATERALITY: Primary | ICD-10-CM

## 2022-02-03 DIAGNOSIS — G50.0 TRIGEMINAL NEURALGIA OF LEFT SIDE OF FACE: ICD-10-CM

## 2022-02-03 DIAGNOSIS — E78.2 MIXED HYPERLIPIDEMIA: ICD-10-CM

## 2022-02-03 DIAGNOSIS — M17.0 PRIMARY OSTEOARTHRITIS OF BOTH KNEES: ICD-10-CM

## 2022-02-03 DIAGNOSIS — I10 HYPERTENSION, ESSENTIAL: ICD-10-CM

## 2022-02-03 DIAGNOSIS — E08.21 DIABETES MELLITUS DUE TO UNDERLYING CONDITION WITH DIABETIC NEPHROPATHY, WITHOUT LONG-TERM CURRENT USE OF INSULIN (HCC): ICD-10-CM

## 2022-02-03 DIAGNOSIS — M05.40 RHEUMATOID MYOPATHY WITH RHEUMATOID ARTHRITIS (HCC): ICD-10-CM

## 2022-02-03 PROCEDURE — 99214 OFFICE O/P EST MOD 30 MIN: CPT | Performed by: INTERNAL MEDICINE

## 2022-02-03 NOTE — ASSESSMENT & PLAN NOTE
Continues to have ear pain  Initially presented with L ear pain  Now has R ear pain, associated with HA, scalp tenderness, and occasional dizziness  Not associated with dizziness  MRI brain ordered on previous visit, still needs to be completed, scheduled for 2/17  Will follow-up after imaging results  Offered medications in the meantime, pt would prefer to wait till after results and follow-up

## 2022-02-03 NOTE — ASSESSMENT & PLAN NOTE
Lab Results   Component Value Date    HGBA1C 7 1 (H) 01/25/2022     HgbA1C improved from 7 9 to 7 1  Continue metformin

## 2022-02-03 NOTE — PROGRESS NOTES
Assessment/Plan:         1  Otalgia, unspecified laterality  Assessment & Plan:  Continues to have ear pain  Initially presented with L ear pain  Now has R ear pain, associated with HA, scalp tenderness, and occasional dizziness  Not associated with dizziness  MRI brain ordered on previous visit, still needs to be completed, scheduled for 2/17  Will follow-up after imaging results  Offered medications in the meantime, pt would prefer to wait till after results and follow-up  2  Hypertension, essential  Assessment & Plan:  Continue Cozaar      3  Mixed hyperlipidemia  Assessment & Plan:  Continue rosuvastatin  4  Trigeminal neuralgia of left side of face    5  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[address] )    6  Primary osteoarthritis of both knees    7  Diabetes mellitus due to underlying condition with diabetic nephropathy, without long-term current use of insulin ([facility])  Assessment & Plan:    Lab Results   Component Value Date    HGBA1C 7 1 (H) 01/25/2022     HgbA1C improved from 7 9 to 7 1  Continue metformin  Subjective:      Patient ID: Valentin Gamez is a 78 y o  female  Continues to have ear pain  Initially presented with L ear pain  Now has R ear pain, associated with HA, scalp tenderness, and occasional dizziness  Not associated with dizziness  Episodes occur when she is working out or cleaning her home  Pt also notes resolving rash on back of neck, midline  MRI brain ordered on previous visit, still needs to be completed, scheduled for 2/17         The following portions of the patient's history were reviewed and updated as appropriate: She  has a past medical history of Degenerative joint disease of knee, Depression with anxiety, Diabetes mellitus ([address] ), Diabetes mellitus due to underlying condition with diabetic nephropathy ([facility] [address] ), Dysthymic disorder, Esophageal reflux, Essential (primary) hypertension, Fibromyalgia, Gastroesophageal reflux disease, Hypercholesterolemia, Hyperlipidemia, Hypertension, essential, Influenza vaccination declined, Obstructive sleep apnea (adult) (pediatric), Osteoporosis, 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[facility] ), Other specified disorders of white blood cells, Pneumococcal vaccination declined, Psoriatic arthropathy ([facility] [address] ), Rheumatoid arthritis ([facility] [address] ), 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[facility] [address] ), Right knee pain, Sleep apnea, Tietze's disease, and Type II diabetes mellitus, uncontrolled ([facility] [address] )  She   Patient Active Problem List    Diagnosis Date Noted   Semaj Spencetiff 02/03/2022    Trigeminal neuralgia of left side of face 01/20/2022    Primary osteoarthritis of both knees 09/16/2021    Smoking 09/09/2021    Arthritis 09/09/2021    Medicare annual wellness visit, subsequent 01/14/2021    Body mass index 31 0-31 9, adult 01/14/2021    Pneumococcal vaccination declined     Influenza vaccination declined     Type II diabetes mellitus, uncontrolled ([facility] [address] )     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[facility] [address] )     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[facility] [address] )     Obstructive sleep apnea syndrome     Hypertension, essential     Mixed hyperlipidemia     Gastroesophageal reflux disease     Dysthymic disorder     Osteoporosis     Diabetes mellitus due to underlying condition with diabetic nephropathy, without long-term current use of insulin ([facility] [address] )      She  has a past surgical history that includes Hysterectomy; Cholecystectomy; Colonoscopy (01/12/2012); and Mammo (historical) (02/04/2016)  Her family history includes Diabetes in her mother; Stroke in her father  She  reports that she has been smoking cigarettes  She has been smoking about 0 25 packs per day  She has never used smokeless tobacco  She reports that she does not drink alcohol and does not use drugs    Current Outpatient Medications   Medication Sig Dispense Refill    aspirin (ECOTRIN LOW STRENGTH) 81 mg EC tablet Take 81 mg by mouth daily      metFORMIN (GLUCOPHAGE) 1000 MG tablet Take 1 tablet (1,000 mg total) by mouth 2 (two) times a day with meals 180 tablet 1    neomycin-polymyxin-hydrocortisone (CORTISPORIN) otic solution Administer 4 drops into both ears every 6 (six) hours 10 mL 0    olmesartan (BENICAR) 40 mg tablet Take 0 5 tablets (20 mg total) by mouth 2 (two) times a day 90 tablet 0    rosuvastatin (CRESTOR) 5 mg tablet Take 1 tablet (5 mg total) by mouth daily 90 tablet 0     No current facility-administered medications for this visit  Current Outpatient Medications on File Prior to Visit   Medication Sig    aspirin (ECOTRIN LOW STRENGTH) 81 mg EC tablet Take 81 mg by mouth daily    metFORMIN (GLUCOPHAGE) 1000 MG tablet Take 1 tablet (1,000 mg total) by mouth 2 (two) times a day with meals    neomycin-polymyxin-hydrocortisone (CORTISPORIN) otic solution Administer 4 drops into both ears every 6 (six) hours    olmesartan (BENICAR) 40 mg tablet Take 0 5 tablets (20 mg total) by mouth 2 (two) times a day    rosuvastatin (CRESTOR) 5 mg tablet Take 1 tablet (5 mg total) by mouth daily     No current facility-administered medications on file prior to visit  She is allergic to nuts - food allergy, atorvastatin, ezetimibe, latex, lisinopril, methotrexate, other, penicillins, pravastatin, and shellfish allergy - food allergy       Review of Systems   Constitutional: Negative for chills and fever  HENT: Positive for ear pain  Negative for congestion and sore throat  Eyes: Negative for pain  Respiratory: Negative for cough and shortness of breath  Cardiovascular: Negative for chest pain and leg swelling  Gastrointestinal: Negative for abdominal pain, nausea and vomiting  Endocrine: Negative for polyuria  Genitourinary: Negative for difficulty urinating, frequency and urgency  Musculoskeletal: Negative for arthralgias and back pain  Skin: Negative for rash  Neurological: Positive for headaches  Negative for weakness     Psychiatric/Behavioral: Negative for sleep disturbance  The patient is not nervous/anxious  Objective:      /80 (BP Location: Right arm, Patient Position: Sitting, Cuff Size: Standard)   Pulse 84   Temp 97 8 °F (36 6 °C) (Temporal)   Ht 5' 1" (1 549 m)   Wt 73 kg (161 lb)   SpO2 97%   BMI 30 42 kg/m²     Recent Results (from the past 1344 hour(s))   Hemoglobin A1C    Collection Time: 01/25/22 12:00 AM   Result Value Ref Range    Hemoglobin A1C 7 1    Microalbumin / creatinine urine ratio    Collection Time: 01/25/22 12:00 AM   Result Value Ref Range    EXT Creatinine Urine 204 0     Microalbum  ,U,Random 2 1     EXTERNAL Microalb/Creat Ratio 10 3    HEMOGLOBIN A1C    Collection Time: 01/25/22  8:38 AM   Result Value Ref Range    Hemoglobin A1C 7 1 (H) <5 7 %    eAG, EST AVG Glucose 157 (H) <154 mg/dL        Physical Exam  Vitals reviewed  Constitutional:       Appearance: Normal appearance  HENT:      Head: Normocephalic  Comments: R scalp TTP     Right Ear: Tympanic membrane, ear canal and external ear normal       Left Ear: Tympanic membrane, ear canal and external ear normal       Nose: Nose normal  No congestion  Mouth/Throat:      Mouth: Mucous membranes are moist       Pharynx: Oropharynx is clear  No oropharyngeal exudate or posterior oropharyngeal erythema  Eyes:      Extraocular Movements: Extraocular movements intact  Conjunctiva/sclera: Conjunctivae normal       Pupils: Pupils are equal, round, and reactive to light  Neck:      Comments: Healing rash, back of neck, midline  Cardiovascular:      Rate and Rhythm: Normal rate and regular rhythm  Heart sounds: Normal heart sounds  No murmur heard  Pulmonary:      Effort: Pulmonary effort is normal       Breath sounds: Normal breath sounds  No wheezing or rales  Abdominal:      General: Bowel sounds are normal  There is no distension  Palpations: Abdomen is soft  Tenderness: There is no abdominal tenderness     Musculoskeletal: General: Normal range of motion  Cervical back: Normal range of motion and neck supple  Right lower leg: No edema  Left lower leg: No edema  Lymphadenopathy:      Cervical: No cervical adenopathy  Skin:     General: Skin is warm  Neurological:      General: No focal deficit present  Mental Status: She is alert and oriented to person, place, and time

## 2022-02-19 ENCOUNTER — HOSPITAL ENCOUNTER (OUTPATIENT)
Dept: RADIOLOGY | Facility: HOSPITAL | Age: 67
Discharge: HOME | End: 2022-02-19
Attending: INTERNAL MEDICINE
Payer: COMMERCIAL

## 2022-02-19 DIAGNOSIS — G50.0 TRIGEMINAL NEURALGIA OF LEFT SIDE OF FACE: ICD-10-CM

## 2022-02-19 PROCEDURE — A9585 GADOBUTROL INJECTION: HCPCS | Performed by: INTERNAL MEDICINE

## 2022-02-19 PROCEDURE — G1004 CDSM NDSC: HCPCS

## 2022-02-19 PROCEDURE — 70553 MRI BRAIN STEM W/O & W/DYE: CPT

## 2022-02-19 RX ADMIN — GADOBUTROL 7 ML: 604.72 INJECTION INTRAVENOUS at 11:39

## 2022-03-03 ENCOUNTER — OFFICE VISIT (OUTPATIENT)
Dept: INTERNAL MEDICINE CLINIC | Facility: CLINIC | Age: 67
End: 2022-03-03
Payer: COMMERCIAL

## 2022-03-03 VITALS
OXYGEN SATURATION: 97 % | SYSTOLIC BLOOD PRESSURE: 122 MMHG | WEIGHT: 162 LBS | TEMPERATURE: 98.2 F | DIASTOLIC BLOOD PRESSURE: 74 MMHG | HEIGHT: 61 IN | HEART RATE: 84 BPM | BODY MASS INDEX: 30.58 KG/M2

## 2022-03-03 DIAGNOSIS — E78.2 MIXED HYPERLIPIDEMIA: ICD-10-CM

## 2022-03-03 DIAGNOSIS — Z01.818 PREOP EXAM FOR INTERNAL MEDICINE: Primary | ICD-10-CM

## 2022-03-03 DIAGNOSIS — M17.0 PRIMARY OSTEOARTHRITIS OF BOTH KNEES: ICD-10-CM

## 2022-03-03 DIAGNOSIS — E11.65 UNCONTROLLED TYPE 2 DIABETES MELLITUS WITH HYPERGLYCEMIA (HCC): ICD-10-CM

## 2022-03-03 DIAGNOSIS — Z13.820 SCREENING FOR OSTEOPOROSIS: ICD-10-CM

## 2022-03-03 DIAGNOSIS — I10 HYPERTENSION, ESSENTIAL: ICD-10-CM

## 2022-03-03 DIAGNOSIS — M05.40 RHEUMATOID MYOPATHY WITH RHEUMATOID ARTHRITIS (HCC): ICD-10-CM

## 2022-03-03 PROCEDURE — 99214 OFFICE O/P EST MOD 30 MIN: CPT | Performed by: INTERNAL MEDICINE

## 2022-03-03 RX ORDER — ROSUVASTATIN CALCIUM 5 MG/1
TABLET, COATED ORAL
Qty: 90 TABLET | Refills: 0 | Status: SHIPPED | OUTPATIENT
Start: 2022-03-03 | End: 2022-03-10

## 2022-03-03 RX ORDER — OLMESARTAN MEDOXOMIL 40 MG/1
20 TABLET ORAL 2 TIMES DAILY
Qty: 90 TABLET | Refills: 1 | Status: SHIPPED | OUTPATIENT
Start: 2022-03-03

## 2022-03-03 NOTE — PROGRESS NOTES
Assessment/Plan:             1  Preop exam for internal medicine  Comments:  Cleared for dental procedure, at minimal risk for perioperative cardiovascular events    2  Screening for osteoporosis  -     DXA bone density spine hip and pelvis; Future; Expected date: 03/03/2022    3  Hypertension, essential  -     olmesartan (BENICAR) 40 mg tablet; Take 0 5 tablets (20 mg total) by mouth 2 (two) times a day    4  Uncontrolled type 2 diabetes mellitus with hyperglycemia (HCC)  -     metFORMIN (GLUCOPHAGE) 1000 MG tablet; Take 1 tablet (1,000 mg total) by mouth 2 (two) times a day with meals  -     CBC and differential; Future  -     Comprehensive metabolic panel; Future  -     Hemoglobin A1C; Future  -     Lipid Panel with Direct LDL reflex; Future  -     Microalbumin / creatinine urine ratio  -     TSH, 3rd generation; Future    5  Mixed hyperlipidemia    6  Primary osteoarthritis of both knees    7  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HCC)           Subjective:      Patient ID: Darlene Sweet is a 78 y o  female      Preop for dental procedure, MRI done 02/19/2022 discussed with her      The following portions of the patient's history were reviewed and updated as appropriate: She  has a past medical history of Degenerative joint disease of knee, Depression with anxiety, Diabetes mellitus ([address] ), Diabetes mellitus due to underlying condition with diabetic nephropathy ([address] ), Dysthymic disorder, Esophageal reflux, Essential (primary) hypertension, Fibromyalgia, Gastroesophageal reflux disease, Hypercholesterolemia, Hyperlipidemia, Hypertension, essential, Influenza vaccination declined, Obstructive sleep apnea (adult) (pediatric), Osteoporosis, 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[address] ), Other specified disorders of white blood cells, Pneumococcal vaccination declined, Psoriatic arthropathy ([address] ), Rheumatoid arthritis ([address] ), 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[address] ), Right knee pain, Sleep apnea, Tietze's disease, and Type II diabetes mellitus, uncontrolled ([facility] )  She   Patient Active Problem List    Diagnosis Date Noted   Shi Fernandez 02/03/2022    Trigeminal neuralgia of left side of face 01/20/2022    Primary osteoarthritis of both knees 09/16/2021    Smoking 09/09/2021    Arthritis 09/09/2021    Screening for osteoporosis 01/14/2021    Body mass index 31 0-31 9, adult 01/14/2021    Pneumococcal vaccination declined     Influenza vaccination declined     Type II diabetes mellitus, uncontrolled ([facility] )     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[facility] )     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[facility] )     Obstructive sleep apnea syndrome     Hypertension, essential     Mixed hyperlipidemia     Gastroesophageal reflux disease     Dysthymic disorder     Osteoporosis     Diabetes mellitus due to underlying condition with diabetic nephropathy, without long-term current use of insulin ([facility] )      She  has a past surgical history that includes Hysterectomy; Cholecystectomy; Colonoscopy (01/12/2012); and Mammo (historical) (02/04/2016)  Her family history includes Diabetes in her mother; Stroke in her father  She  reports that she has been smoking cigarettes  She has been smoking about 0 25 packs per day  She has never used smokeless tobacco  She reports that she does not drink alcohol and does not use drugs    Current Outpatient Medications   Medication Sig Dispense Refill    aspirin (ECOTRIN LOW STRENGTH) 81 mg EC tablet Take 81 mg by mouth daily      metFORMIN (GLUCOPHAGE) 1000 MG tablet Take 1 tablet (1,000 mg total) by mouth 2 (two) times a day with meals 180 tablet 1    neomycin-polymyxin-hydrocortisone (CORTISPORIN) otic solution Administer 4 drops into both ears every 6 (six) hours 10 mL 0    olmesartan (BENICAR) 40 mg tablet Take 0 5 tablets (20 mg total) by mouth 2 (two) times a day 90 tablet 1    rosuvastatin (CRESTOR) 5 mg tablet Take 1 tablet (5 mg total) by mouth daily 90 tablet 0     No current facility-administered medications for this visit  Current Outpatient Medications on File Prior to Visit   Medication Sig    aspirin (ECOTRIN LOW STRENGTH) 81 mg EC tablet Take 81 mg by mouth daily    neomycin-polymyxin-hydrocortisone (CORTISPORIN) otic solution Administer 4 drops into both ears every 6 (six) hours    rosuvastatin (CRESTOR) 5 mg tablet Take 1 tablet (5 mg total) by mouth daily    [DISCONTINUED] metFORMIN (GLUCOPHAGE) 1000 MG tablet Take 1 tablet (1,000 mg total) by mouth 2 (two) times a day with meals    [DISCONTINUED] olmesartan (BENICAR) 40 mg tablet Take 0 5 tablets (20 mg total) by mouth 2 (two) times a day     No current facility-administered medications on file prior to visit  She is allergic to nuts - food allergy, atorvastatin, ezetimibe, latex, lisinopril, methotrexate, other, penicillins, pravastatin, and shellfish allergy - food allergy       Review of Systems   Constitutional: Negative for chills and fever  HENT: Negative for congestion, ear pain and sore throat  Eyes: Negative for pain  Respiratory: Negative for cough and shortness of breath  Cardiovascular: Negative for chest pain and leg swelling  Gastrointestinal: Negative for abdominal pain, nausea and vomiting  Endocrine: Negative for polyuria  Genitourinary: Negative for difficulty urinating, frequency and urgency  Musculoskeletal: Positive for arthralgias  Negative for back pain  Skin: Negative for rash  Neurological: Negative for weakness and headaches  Psychiatric/Behavioral: Negative for sleep disturbance  The patient is not nervous/anxious            Objective:      /74 (BP Location: Right arm, Patient Position: Sitting, Cuff Size: Standard)   Pulse 84   Temp 98 2 °F (36 8 °C) (Temporal)   Ht 5' 1" (1 549 m)   Wt 73 5 kg (162 lb)   SpO2 97%   BMI 30 61 kg/m²     Recent Results (from the past 1344 hour(s))   Hemoglobin A1C    Collection Time: 01/25/22 12:00 AM   Result Value Ref Range    Hemoglobin A1C 7 1    Microalbumin / creatinine urine ratio    Collection Time: 01/25/22 12:00 AM   Result Value Ref Range    EXT Creatinine Urine 204 0     Microalbum  ,U,Random 2 1     EXTERNAL Microalb/Creat Ratio 10 3    HEMOGLOBIN A1C    Collection Time: 01/25/22  8:38 AM   Result Value Ref Range    Hemoglobin A1C 7 1 (H) <5 7 %    eAG, EST AVG Glucose 157 (H) <154 mg/dL        Physical Exam  Constitutional:       Appearance: Normal appearance  HENT:      Head: Normocephalic  Right Ear: Tympanic membrane, ear canal and external ear normal       Left Ear: Tympanic membrane, ear canal and external ear normal       Nose: Nose normal  No congestion  Mouth/Throat:      Mouth: Mucous membranes are moist       Pharynx: Oropharynx is clear  No oropharyngeal exudate or posterior oropharyngeal erythema  Eyes:      Extraocular Movements: Extraocular movements intact  Conjunctiva/sclera: Conjunctivae normal       Pupils: Pupils are equal, round, and reactive to light  Cardiovascular:      Rate and Rhythm: Normal rate and regular rhythm  Heart sounds: Normal heart sounds  No murmur heard  Pulmonary:      Effort: Pulmonary effort is normal       Breath sounds: Normal breath sounds  No wheezing or rales  Abdominal:      General: Bowel sounds are normal  There is no distension  Palpations: Abdomen is soft  Tenderness: There is no abdominal tenderness  Musculoskeletal:         General: Normal range of motion  Cervical back: Normal range of motion and neck supple  Right lower leg: No edema  Left lower leg: No edema  Lymphadenopathy:      Cervical: No cervical adenopathy  Skin:     General: Skin is warm  Neurological:      General: No focal deficit present  Mental Status: She is alert and oriented to person, place, and time

## 2022-03-10 DIAGNOSIS — E78.2 MIXED HYPERLIPIDEMIA: ICD-10-CM

## 2022-03-10 RX ORDER — ROSUVASTATIN CALCIUM 5 MG/1
TABLET, COATED ORAL
Qty: 90 TABLET | Refills: 0 | Status: SHIPPED | OUTPATIENT
Start: 2022-03-10

## 2022-03-14 ENCOUNTER — VBI (OUTPATIENT)
Dept: ADMINISTRATIVE | Facility: OTHER | Age: 67
End: 2022-03-14

## 2022-05-23 ENCOUNTER — VBI (OUTPATIENT)
Dept: ADMINISTRATIVE | Facility: OTHER | Age: 67
End: 2022-05-23

## 2022-06-07 ENCOUNTER — VBI (OUTPATIENT)
Dept: ADMINISTRATIVE | Facility: OTHER | Age: 67
End: 2022-06-07

## 2022-07-12 LAB — HBA1C MFR BLD HPLC: 8.1 %

## 2022-07-18 ENCOUNTER — OFFICE VISIT (OUTPATIENT)
Dept: INTERNAL MEDICINE CLINIC | Facility: CLINIC | Age: 67
End: 2022-07-18
Payer: COMMERCIAL

## 2022-07-18 VITALS
BODY MASS INDEX: 30.96 KG/M2 | HEIGHT: 61 IN | HEART RATE: 78 BPM | DIASTOLIC BLOOD PRESSURE: 62 MMHG | SYSTOLIC BLOOD PRESSURE: 104 MMHG | TEMPERATURE: 98.1 F | WEIGHT: 164 LBS | OXYGEN SATURATION: 97 %

## 2022-07-18 DIAGNOSIS — Z12.31 ENCOUNTER FOR SCREENING MAMMOGRAM FOR MALIGNANT NEOPLASM OF BREAST: ICD-10-CM

## 2022-07-18 DIAGNOSIS — Z13.820 SCREENING FOR OSTEOPOROSIS: ICD-10-CM

## 2022-07-18 DIAGNOSIS — E78.2 MIXED HYPERLIPIDEMIA: ICD-10-CM

## 2022-07-18 DIAGNOSIS — M81.0 SENILE OSTEOPOROSIS: ICD-10-CM

## 2022-07-18 DIAGNOSIS — F33.9 DEPRESSION, RECURRENT (HCC): ICD-10-CM

## 2022-07-18 DIAGNOSIS — M05.40 RHEUMATOID MYOPATHY WITH RHEUMATOID ARTHRITIS (HCC): ICD-10-CM

## 2022-07-18 DIAGNOSIS — E08.21 DIABETES MELLITUS DUE TO UNDERLYING CONDITION WITH DIABETIC NEPHROPATHY, WITHOUT LONG-TERM CURRENT USE OF INSULIN (HCC): ICD-10-CM

## 2022-07-18 DIAGNOSIS — E11.65 UNCONTROLLED TYPE 2 DIABETES MELLITUS WITH HYPERGLYCEMIA (HCC): Primary | ICD-10-CM

## 2022-07-18 DIAGNOSIS — M17.0 PRIMARY OSTEOARTHRITIS OF BOTH KNEES: ICD-10-CM

## 2022-07-18 DIAGNOSIS — I10 HYPERTENSION, ESSENTIAL: ICD-10-CM

## 2022-07-18 PROCEDURE — 99214 OFFICE O/P EST MOD 30 MIN: CPT | Performed by: INTERNAL MEDICINE

## 2022-07-18 NOTE — PROGRESS NOTES
Assessment/Plan:             1  Uncontrolled type 2 diabetes mellitus with hyperglycemia ([address] )    2  Screening for osteoporosis    3  Mixed hyperlipidemia    4  Primary osteoarthritis of both knees    5  Encounter for screening mammogram for malignant neoplasm of breast  -     Mammo screening bilateral w 3d & cad; Future; Expected date: 07/18/2022    6  Hypertension, essential    7  Diabetes mellitus due to underlying condition with diabetic nephropathy, without long-term current use of insulin ([address] )    8  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[address] )    9  Depression, recurrent ([facility] )    10  Senile osteoporosis  -     DXA bone density spine hip and pelvis; Future; Expected date: 07/18/2022         Subjective:      Patient ID: Christiano Akbar is a 78 y o  female  Follow-up on blood test and urine test done on 07/12/2022 test discussed with her      The following portions of the patient's history were reviewed and updated as appropriate: She  has a past medical history of Degenerative joint disease of knee, Depression with anxiety, Diabetes mellitus ([address] ), Diabetes mellitus due to underlying condition with diabetic nephropathy ([address] ), Dysthymic disorder, Esophageal reflux, Essential (primary) hypertension, Fibromyalgia, Gastroesophageal reflux disease, Hypercholesterolemia, Hyperlipidemia, Hypertension, essential, Influenza vaccination declined, Obstructive sleep apnea (adult) (pediatric), Osteoporosis, 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[address] ), Other specified disorders of white blood cells, Pneumococcal vaccination declined, Psoriatic arthropathy ([address] ), Rheumatoid arthritis ([address] ), 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[address] ), Right knee pain, Sleep apnea, Tietze's disease, and Type II diabetes mellitus, uncontrolled    She   Patient Active Problem List    Diagnosis Date Noted    Depression, recurrent ([address] ) 07/18/2022    Otalgia 02/03/2022    Trigeminal neuralgia of left side of face 01/20/2022    Primary osteoarthritis of both knees 09/16/2021    Smoking 09/09/2021    Arthritis 09/09/2021    Encounter for screening mammogram for malignant neoplasm of breast 01/14/2021    Body mass index 31 0-31 9, adult 01/14/2021    Pneumococcal vaccination declined     Influenza vaccination declined     Uncontrolled type 2 diabetes mellitus with hyperglycemia ([facility] )     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[place] [address] )     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[place] [address] )     Obstructive sleep apnea syndrome     Hypertension, essential     Mixed hyperlipidemia     Gastroesophageal reflux disease     Dysthymic disorder     Osteoporosis     Diabetes mellitus due to underlying condition with diabetic nephropathy, without long-term current use of insulin ([place] [address] )      She  has a past surgical history that includes Hysterectomy; Cholecystectomy; Colonoscopy (01/12/2012); and Mammo (historical) (02/04/2016)  Her family history includes Diabetes in her mother; Stroke in her father  She  reports that she has been smoking cigarettes  She has been smoking about 0 25 packs per day  She has never used smokeless tobacco  She reports that she does not drink alcohol and does not use drugs  Current Outpatient Medications   Medication Sig Dispense Refill    aspirin (ECOTRIN LOW STRENGTH) 81 mg EC tablet Take 81 mg by mouth daily      metFORMIN (GLUCOPHAGE) 1000 MG tablet Take 1 tablet (1,000 mg total) by mouth 2 (two) times a day with meals 180 tablet 1    olmesartan (BENICAR) 40 mg tablet Take 0 5 tablets (20 mg total) by mouth 2 (two) times a day 90 tablet 1    rosuvastatin (CRESTOR) 5 mg tablet TAKE 1 TABLET(5 MG) BY MOUTH DAILY 90 tablet 0     No current facility-administered medications for this visit       Current Outpatient Medications on File Prior to Visit   Medication Sig    aspirin (ECOTRIN LOW STRENGTH) 81 mg EC tablet Take 81 mg by mouth daily    metFORMIN (GLUCOPHAGE) 1000 MG tablet Take 1 tablet (1,000 mg total) by mouth 2 (two) times a day with meals    olmesartan (BENICAR) 40 mg tablet Take 0 5 tablets (20 mg total) by mouth 2 (two) times a day    rosuvastatin (CRESTOR) 5 mg tablet TAKE 1 TABLET(5 MG) BY MOUTH DAILY    [DISCONTINUED] neomycin-polymyxin-hydrocortisone (CORTISPORIN) otic solution Administer 4 drops into both ears every 6 (six) hours     No current facility-administered medications on file prior to visit  She is allergic to nuts - food allergy, atorvastatin, ezetimibe, latex, lisinopril, methotrexate, other, penicillins, pravastatin, and shellfish allergy - food allergy       Review of Systems   Constitutional: Negative for chills and fever  HENT: Negative for congestion, ear pain and sore throat  Eyes: Negative for pain  Respiratory: Negative for cough and shortness of breath  Cardiovascular: Negative for chest pain and leg swelling  Gastrointestinal: Negative for abdominal pain, nausea and vomiting  Endocrine: Negative for polyuria  Genitourinary: Negative for difficulty urinating, frequency and urgency  Musculoskeletal: Positive for arthralgias and back pain  Skin: Negative for rash  Neurological: Negative for weakness and headaches  Psychiatric/Behavioral: Negative for sleep disturbance  The patient is not nervous/anxious  Objective:      /62 (BP Location: Right arm, Patient Position: Sitting, Cuff Size: Standard)   Pulse 78   Temp 98 1 °F (36 7 °C) (Temporal)   Ht 5' 1" (1 549 m)   Wt 74 4 kg (164 lb)   SpO2 97%   BMI 30 99 kg/m²     Recent Results (from the past 1344 hour(s))   Hemoglobin A1C    Collection Time: 07/12/22 12:00 AM   Result Value Ref Range    Hemoglobin A1C 8 1    HEMOGLOBIN A1C    Collection Time: 07/12/22  8:50 AM   Result Value Ref Range    Hemoglobin A1C 8 1 (H) <5 7 %    eAG, EST AVG Glucose 186 (H) <154 mg/dL        Physical Exam  Constitutional:       Appearance: Normal appearance  HENT:      Head: Normocephalic        Right Ear: Tympanic membrane, ear canal and external ear normal       Left Ear: Tympanic membrane, ear canal and external ear normal       Nose: Nose normal  No congestion  Mouth/Throat:      Mouth: Mucous membranes are moist       Pharynx: Oropharynx is clear  No oropharyngeal exudate or posterior oropharyngeal erythema  Eyes:      Extraocular Movements: Extraocular movements intact  Conjunctiva/sclera: Conjunctivae normal       Pupils: Pupils are equal, round, and reactive to light  Cardiovascular:      Rate and Rhythm: Normal rate and regular rhythm  Heart sounds: Normal heart sounds  No murmur heard  Pulmonary:      Effort: Pulmonary effort is normal       Breath sounds: Normal breath sounds  No wheezing or rales  Abdominal:      General: Bowel sounds are normal  There is no distension  Palpations: Abdomen is soft  Tenderness: There is no abdominal tenderness  Musculoskeletal:         General: Normal range of motion  Cervical back: Normal range of motion and neck supple  Right lower leg: No edema  Left lower leg: No edema  Lymphadenopathy:      Cervical: No cervical adenopathy  Skin:     General: Skin is warm  Neurological:      General: No focal deficit present  Mental Status: She is alert and oriented to person, place, and time

## 2022-07-18 NOTE — PROGRESS NOTES
Diabetic Foot Exam    Patient's shoes and socks removed  Right Foot/Ankle   Right Foot Inspection  Skin Exam: skin normal and skin intact  No dry skin, no warmth, no callus, no erythema, no maceration, no abnormal color, no pre-ulcer, no ulcer and no callus  Toe Exam: ROM and strength within normal limits  No swelling and no tenderness    Sensory   Monofilament testing: intact    Vascular  Capillary refills: < 3 seconds  The right DP pulse is 2+  The right PT pulse is 2+  Left Foot/Ankle  Left Foot Inspection  Skin Exam: skin normal and skin intact  No dry skin, no warmth, no erythema, no maceration, normal color, no pre-ulcer, no ulcer and no callus  Toe Exam: ROM and strength within normal limits  No swelling and no tenderness  Sensory   Monofilament testing: intact    Vascular  Capillary refills: < 3 seconds  The left DP pulse is 2+  The left PT pulse is 2+       Assign Risk Category  No deformity present  No loss of protective sensation  No weak pulses  Risk: 0

## 2022-07-20 ENCOUNTER — VBI (OUTPATIENT)
Dept: ADMINISTRATIVE | Facility: OTHER | Age: 67
End: 2022-07-20

## 2022-07-20 NOTE — TELEPHONE ENCOUNTER
07/20/22 1:46 PM     See documentation in the [employer] SmartForm       Durga Alvarenga MA Previously Declined (within the last year)

## 2022-08-23 ENCOUNTER — TELEPHONE (OUTPATIENT)
Dept: INTERNAL MEDICINE CLINIC | Facility: CLINIC | Age: 67
End: 2022-08-23

## 2022-08-23 NOTE — TELEPHONE ENCOUNTER
11/14/22 nov   Called patient and left message to call office to let us know what medications she needs refilled

## 2022-09-07 ENCOUNTER — VBI (OUTPATIENT)
Dept: ADMINISTRATIVE | Facility: OTHER | Age: 67
End: 2022-09-07

## 2022-10-03 ENCOUNTER — OFFICE VISIT (OUTPATIENT)
Dept: INTERNAL MEDICINE CLINIC | Facility: CLINIC | Age: 67
End: 2022-10-03
Payer: COMMERCIAL

## 2022-10-03 VITALS
SYSTOLIC BLOOD PRESSURE: 128 MMHG | HEIGHT: 61 IN | OXYGEN SATURATION: 97 % | BODY MASS INDEX: 30.96 KG/M2 | HEART RATE: 75 BPM | WEIGHT: 164 LBS | TEMPERATURE: 97.9 F | DIASTOLIC BLOOD PRESSURE: 84 MMHG

## 2022-10-03 DIAGNOSIS — N39.0 URINARY TRACT INFECTION WITHOUT HEMATURIA, SITE UNSPECIFIED: ICD-10-CM

## 2022-10-03 DIAGNOSIS — M54.50 LOW BACK PAIN AT MULTIPLE SITES: Primary | ICD-10-CM

## 2022-10-03 PROCEDURE — 99213 OFFICE O/P EST LOW 20 MIN: CPT | Performed by: INTERNAL MEDICINE

## 2022-10-03 RX ORDER — SULFAMETHOXAZOLE AND TRIMETHOPRIM 800; 160 MG/1; MG/1
1 TABLET ORAL EVERY 12 HOURS SCHEDULED
Qty: 14 TABLET | Refills: 0 | Status: SHIPPED | OUTPATIENT
Start: 2022-10-03 | End: 2022-10-10

## 2022-10-03 RX ORDER — NAPROXEN 500 MG/1
500 TABLET ORAL 2 TIMES DAILY WITH MEALS
Qty: 14 TABLET | Refills: 0 | Status: SHIPPED | OUTPATIENT
Start: 2022-10-03

## 2022-10-03 NOTE — PROGRESS NOTES
Assessment/Plan:      Tobacco Cessation Counseling: Tobacco cessation counseling was provided  The patient is sincerely urged to quit consumption of tobacco  She is ready to quit tobacco              1  Low back pain at multiple sites  -     naproxen (Naprosyn) 500 mg tablet; Take 1 tablet (500 mg total) by mouth 2 (two) times a day with meals    2  Urinary tract infection without hematuria, site unspecified  -     sulfamethoxazole-trimethoprim (BACTRIM DS) 800-160 mg per tablet; Take 1 tablet by mouth every 12 (twelve) hours for 7 days         Subjective:      Patient ID: Mikayla Nichols is a 78 y o  female  Low back pain, also started with the abdominal pain on the lower part, feels difficulty with the urine, uncomfortable sensation      The following portions of the patient's history were reviewed and updated as appropriate: She  has a past medical history of Degenerative joint disease of knee, Depression with anxiety, Diabetes mellitus ([address] ), Diabetes mellitus due to underlying condition with diabetic nephropathy ([address] ), Dysthymic disorder, Esophageal reflux, Essential (primary) hypertension, Fibromyalgia, Gastroesophageal reflux disease, Hypercholesterolemia, Hyperlipidemia, Hypertension, essential, Influenza vaccination declined, Obstructive sleep apnea (adult) (pediatric), Osteoporosis, 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[address] ), Other specified disorders of white blood cells, Pneumococcal vaccination declined, Psoriatic arthropathy ([address] ), Rheumatoid arthritis ([address] ), 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[address] ), Right knee pain, Sleep apnea, Tietze's disease, and Type II diabetes mellitus, uncontrolled    She   Patient Active Problem List    Diagnosis Date Noted    Depression, recurrent ([address] ) 07/18/2022    Otalgia 02/03/2022    Trigeminal neuralgia of left side of face 01/20/2022    Primary osteoarthritis of both knees 09/16/2021    Smoking 09/09/2021    Arthritis 09/09/2021    Encounter for screening mammogram for malignant neoplasm of breast 01/14/2021    Body mass index 31 0-31 9, adult 01/14/2021    Pneumococcal vaccination declined     Influenza vaccination declined     Uncontrolled type 2 diabetes mellitus with hyperglycemia ([facility] )     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[place] [address] )     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[place] [address] )     Obstructive sleep apnea syndrome     Hypertension, essential     Mixed hyperlipidemia     Gastroesophageal reflux disease     Dysthymic disorder     Osteoporosis     Diabetes mellitus due to underlying condition with diabetic nephropathy, without long-term current use of insulin ([facility] )      She  has a past surgical history that includes Hysterectomy; Cholecystectomy; Colonoscopy (01/12/2012); and Mammo (historical) (02/04/2016)  Her family history includes Breast cancer in her sister; Cancer in her sister; Diabetes in her mother; Stroke in her father  She  reports that she has been smoking cigarettes  She has been smoking about 0 25 packs per day  She has never used smokeless tobacco  She reports that she does not drink alcohol and does not use drugs  Current Outpatient Medications   Medication Sig Dispense Refill    aspirin (ECOTRIN LOW STRENGTH) 81 mg EC tablet Take 81 mg by mouth daily      metFORMIN (GLUCOPHAGE) 1000 MG tablet Take 1 tablet (1,000 mg total) by mouth 2 (two) times a day with meals 180 tablet 1    naproxen (Naprosyn) 500 mg tablet Take 1 tablet (500 mg total) by mouth 2 (two) times a day with meals 14 tablet 0    olmesartan (BENICAR) 40 mg tablet Take 0 5 tablets (20 mg total) by mouth 2 (two) times a day 90 tablet 1    rosuvastatin (CRESTOR) 5 mg tablet TAKE 1 TABLET(5 MG) BY MOUTH DAILY 90 tablet 0    sulfamethoxazole-trimethoprim (BACTRIM DS) 800-160 mg per tablet Take 1 tablet by mouth every 12 (twelve) hours for 7 days 14 tablet 0     No current facility-administered medications for this visit       Current Outpatient Medications on File Prior to Visit   Medication Sig    aspirin (ECOTRIN LOW STRENGTH) 81 mg EC tablet Take 81 mg by mouth daily    metFORMIN (GLUCOPHAGE) 1000 MG tablet Take 1 tablet (1,000 mg total) by mouth 2 (two) times a day with meals    olmesartan (BENICAR) 40 mg tablet Take 0 5 tablets (20 mg total) by mouth 2 (two) times a day    rosuvastatin (CRESTOR) 5 mg tablet TAKE 1 TABLET(5 MG) BY MOUTH DAILY     No current facility-administered medications on file prior to visit  She is allergic to nuts - food allergy, atorvastatin, ezetimibe, latex, lisinopril, methotrexate, other, penicillins, pravastatin, and shellfish allergy - food allergy       Review of Systems   Constitutional: Positive for chills  Negative for fever  HENT: Negative for congestion, ear pain and sore throat  Eyes: Negative for pain  Respiratory: Negative for cough and shortness of breath  Cardiovascular: Negative for chest pain and leg swelling  Gastrointestinal: Positive for abdominal pain  Negative for nausea and vomiting  Endocrine: Negative for polyuria  Genitourinary: Negative for difficulty urinating, frequency and urgency  Musculoskeletal: Positive for back pain  Negative for arthralgias  Skin: Negative for rash  Neurological: Negative for weakness and headaches  Psychiatric/Behavioral: Negative for sleep disturbance  The patient is not nervous/anxious  Objective:      /84 (BP Location: Left arm, Patient Position: Sitting, Cuff Size: Large)   Pulse 75   Temp 97 9 °F (36 6 °C) (Temporal)   Ht 5' 1" (1 549 m)   Wt 74 4 kg (164 lb)   SpO2 97% Comment: RA  BMI 30 99 kg/m²     No results found for this or any previous visit (from the past 1344 hour(s))  Physical Exam  Constitutional:       Appearance: Normal appearance  HENT:      Head: Normocephalic        Right Ear: Tympanic membrane, ear canal and external ear normal       Left Ear: Tympanic membrane, ear canal and external ear normal       Nose: Nose normal  No congestion  Mouth/Throat:      Mouth: Mucous membranes are moist       Pharynx: Oropharynx is clear  No oropharyngeal exudate or posterior oropharyngeal erythema  Eyes:      Extraocular Movements: Extraocular movements intact  Conjunctiva/sclera: Conjunctivae normal       Pupils: Pupils are equal, round, and reactive to light  Cardiovascular:      Rate and Rhythm: Normal rate and regular rhythm  Heart sounds: Normal heart sounds  No murmur heard  Pulmonary:      Effort: Pulmonary effort is normal       Breath sounds: Normal breath sounds  No wheezing or rales  Abdominal:      General: Bowel sounds are normal  There is no distension  Palpations: Abdomen is soft  Tenderness: There is abdominal tenderness  There is no right CVA tenderness or left CVA tenderness  Comments: Mild tenderness suprapubic region   Musculoskeletal:      Cervical back: Normal range of motion and neck supple  Right lower leg: No edema  Left lower leg: No edema  Comments: Low back exam-mild paraspinous spasm, tenderness present, movement painful and restricted due to pain, SLR positive bilaterally, was complaining pain on the lower back   Lymphadenopathy:      Cervical: No cervical adenopathy  Skin:     General: Skin is warm  Neurological:      General: No focal deficit present  Mental Status: She is alert and oriented to person, place, and time

## 2022-10-27 ENCOUNTER — VBI (OUTPATIENT)
Dept: ADMINISTRATIVE | Facility: OTHER | Age: 67
End: 2022-10-27

## 2022-11-14 ENCOUNTER — OFFICE VISIT (OUTPATIENT)
Dept: INTERNAL MEDICINE CLINIC | Facility: CLINIC | Age: 67
End: 2022-11-14

## 2022-11-14 VITALS
SYSTOLIC BLOOD PRESSURE: 128 MMHG | BODY MASS INDEX: 31.34 KG/M2 | TEMPERATURE: 98.3 F | WEIGHT: 166 LBS | DIASTOLIC BLOOD PRESSURE: 80 MMHG | HEART RATE: 84 BPM | HEIGHT: 61 IN | OXYGEN SATURATION: 97 %

## 2022-11-14 DIAGNOSIS — M17.0 PRIMARY OSTEOARTHRITIS OF BOTH KNEES: ICD-10-CM

## 2022-11-14 DIAGNOSIS — I10 HYPERTENSION, ESSENTIAL: ICD-10-CM

## 2022-11-14 DIAGNOSIS — E78.2 MIXED HYPERLIPIDEMIA: ICD-10-CM

## 2022-11-14 DIAGNOSIS — M05.40 RHEUMATOID MYOPATHY WITH RHEUMATOID ARTHRITIS (HCC): ICD-10-CM

## 2022-11-14 DIAGNOSIS — E11.65 UNCONTROLLED TYPE 2 DIABETES MELLITUS WITH HYPERGLYCEMIA (HCC): Primary | ICD-10-CM

## 2022-11-14 RX ORDER — OLMESARTAN MEDOXOMIL 40 MG/1
20 TABLET ORAL 2 TIMES DAILY
Qty: 90 TABLET | Refills: 1 | Status: SHIPPED | OUTPATIENT
Start: 2022-11-14

## 2022-11-14 RX ORDER — ROSUVASTATIN CALCIUM 5 MG/1
5 TABLET, COATED ORAL DAILY
Qty: 90 TABLET | Refills: 0 | Status: SHIPPED | OUTPATIENT
Start: 2022-11-14

## 2022-11-14 NOTE — PROGRESS NOTES
Assessment/Plan:             1  Uncontrolled type 2 diabetes mellitus with hyperglycemia (HCC)  -     CBC and differential; Future  -     Comprehensive metabolic panel; Future  -     Hemoglobin A1C; Future  -     Lipid Panel with Direct LDL reflex; Future  -     Microalbumin / creatinine urine ratio  -     TSH, 3rd generation; Future    2  Mixed hyperlipidemia  -     rosuvastatin (CRESTOR) 5 mg tablet; Take 1 tablet (5 mg total) by mouth daily    3  Hypertension, essential  -     olmesartan (BENICAR) 40 mg tablet; Take 0 5 tablets (20 mg total) by mouth 2 (two) times a day    4  Primary osteoarthritis of both knees    5  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HCC)         Subjective:      Patient ID: Christiano Akbar is a 78 y o  female  Follow-up on multiple medical problems to ensure the stable on current medications      The following portions of the patient's history were reviewed and updated as appropriate: She  has a past medical history of Degenerative joint disease of knee, Depression with anxiety, Diabetes mellitus ([address] ), Diabetes mellitus due to underlying condition with diabetic nephropathy ([address] ), Dysthymic disorder, Esophageal reflux, Essential (primary) hypertension, Fibromyalgia, Gastroesophageal reflux disease, Hypercholesterolemia, Hyperlipidemia, Hypertension, essential, Influenza vaccination declined, Obstructive sleep apnea (adult) (pediatric), Osteoporosis, 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[address] ), Other specified disorders of white blood cells, Pneumococcal vaccination declined, Psoriatic arthropathy ([address] ), Rheumatoid arthritis ([address] ), 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[address] ), Right knee pain, Sleep apnea, Tietze's disease, and Type II diabetes mellitus, uncontrolled    She   Patient Active Problem List    Diagnosis Date Noted   • Depression, recurrent ([address] ) 07/18/2022   • Otalgia 02/03/2022   • Trigeminal neuralgia of left side of face 01/20/2022   • Primary osteoarthritis of both knees 09/16/2021   • Smoking 09/09/2021   • Arthritis 09/09/2021   • Encounter for screening mammogram for malignant neoplasm of breast 01/14/2021   • Body mass index 31 0-31 9, adult 01/14/2021   • Pneumococcal vaccination declined    • Influenza vaccination declined    • Uncontrolled type 2 diabetes mellitus with hyperglycemia ([facility] )    • 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[facility] )    • 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HCC)    • Obstructive sleep apnea syndrome    • Hypertension, essential    • Mixed hyperlipidemia    • Gastroesophageal reflux disease    • Dysthymic disorder    • Osteoporosis    • Diabetes mellitus due to underlying condition with diabetic nephropathy, without long-term current use of insulin ([facility] )      She  has a past surgical history that includes Hysterectomy; Cholecystectomy; Colonoscopy (01/12/2012); and Mammo (historical) (02/04/2016)  Her family history includes Breast cancer in her sister; Cancer in her sister; Diabetes in her mother; Stroke in her father  She  reports that she has been smoking cigarettes  She has been smoking about 0 25 packs per day  She has never used smokeless tobacco  She reports that she does not drink alcohol and does not use drugs  Current Outpatient Medications   Medication Sig Dispense Refill   • aspirin (ECOTRIN LOW STRENGTH) 81 mg EC tablet Take 81 mg by mouth daily     • metFORMIN (GLUCOPHAGE) 1000 MG tablet Take 1 tablet (1,000 mg total) by mouth 2 (two) times a day with meals 180 tablet 1   • naproxen (Naprosyn) 500 mg tablet Take 1 tablet (500 mg total) by mouth 2 (two) times a day with meals 14 tablet 0   • olmesartan (BENICAR) 40 mg tablet Take 0 5 tablets (20 mg total) by mouth 2 (two) times a day 90 tablet 1   • rosuvastatin (CRESTOR) 5 mg tablet Take 1 tablet (5 mg total) by mouth daily 90 tablet 0     No current facility-administered medications for this visit       Current Outpatient Medications on File Prior to Visit   Medication Sig   • aspirin (ECOTRIN LOW STRENGTH) 81 mg EC tablet Take 81 mg by mouth daily   • metFORMIN (GLUCOPHAGE) 1000 MG tablet Take 1 tablet (1,000 mg total) by mouth 2 (two) times a day with meals   • naproxen (Naprosyn) 500 mg tablet Take 1 tablet (500 mg total) by mouth 2 (two) times a day with meals   • [DISCONTINUED] olmesartan (BENICAR) 40 mg tablet Take 0 5 tablets (20 mg total) by mouth 2 (two) times a day   • [DISCONTINUED] rosuvastatin (CRESTOR) 5 mg tablet TAKE 1 TABLET(5 MG) BY MOUTH DAILY     No current facility-administered medications on file prior to visit  She is allergic to nuts - food allergy, atorvastatin, ezetimibe, latex, lisinopril, methotrexate, other, penicillins, pravastatin, and shellfish allergy - food allergy       Review of Systems   Constitutional: Negative for chills and fever  HENT: Negative for congestion, ear pain and sore throat  Eyes: Negative for pain  Respiratory: Negative for cough and shortness of breath  Cardiovascular: Negative for chest pain and leg swelling  Gastrointestinal: Negative for abdominal pain, nausea and vomiting  Endocrine: Negative for polyuria  Genitourinary: Negative for difficulty urinating, frequency and urgency  Musculoskeletal: Positive for arthralgias  Negative for back pain  Skin: Negative for rash  Neurological: Negative for weakness and headaches  Psychiatric/Behavioral: Negative for sleep disturbance  The patient is not nervous/anxious  Objective:      /80 (BP Location: Left arm, Patient Position: Sitting, Cuff Size: Large)   Pulse 84   Temp 98 3 °F (36 8 °C) (Temporal)   Ht 5' 1" (1 549 m)   Wt 75 3 kg (166 lb)   SpO2 97%   BMI 31 37 kg/m²     No results found for this or any previous visit (from the past 1344 hour(s))  Physical Exam  Constitutional:       Appearance: Normal appearance  HENT:      Head: Normocephalic        Right Ear: Tympanic membrane, ear canal and external ear normal       Left Ear: Tympanic membrane, ear canal and external ear normal       Nose: Nose normal  No congestion  Mouth/Throat:      Mouth: Mucous membranes are moist       Pharynx: Oropharynx is clear  No oropharyngeal exudate or posterior oropharyngeal erythema  Eyes:      Extraocular Movements: Extraocular movements intact  Conjunctiva/sclera: Conjunctivae normal       Pupils: Pupils are equal, round, and reactive to light  Cardiovascular:      Rate and Rhythm: Normal rate and regular rhythm  Heart sounds: Normal heart sounds  No murmur heard  Pulmonary:      Effort: Pulmonary effort is normal       Breath sounds: Normal breath sounds  No wheezing or rales  Abdominal:      General: Bowel sounds are normal  There is no distension  Palpations: Abdomen is soft  Tenderness: There is no abdominal tenderness  Musculoskeletal:         General: Normal range of motion  Cervical back: Normal range of motion and neck supple  Right lower leg: No edema  Left lower leg: No edema  Lymphadenopathy:      Cervical: No cervical adenopathy  Skin:     General: Skin is warm  Neurological:      General: No focal deficit present  Mental Status: She is alert and oriented to person, place, and time

## 2022-12-05 ENCOUNTER — OFFICE VISIT (OUTPATIENT)
Dept: INTERNAL MEDICINE CLINIC | Facility: CLINIC | Age: 67
End: 2022-12-05

## 2022-12-05 VITALS
TEMPERATURE: 97.6 F | BODY MASS INDEX: 31.15 KG/M2 | SYSTOLIC BLOOD PRESSURE: 128 MMHG | HEIGHT: 61 IN | HEART RATE: 91 BPM | WEIGHT: 165 LBS | OXYGEN SATURATION: 98 % | DIASTOLIC BLOOD PRESSURE: 76 MMHG

## 2022-12-05 DIAGNOSIS — J04.10 TRACHEITIS: Primary | ICD-10-CM

## 2022-12-05 RX ORDER — AZITHROMYCIN 250 MG/1
TABLET, FILM COATED ORAL
Qty: 6 TABLET | Refills: 0 | Status: SHIPPED | OUTPATIENT
Start: 2022-12-05 | End: 2022-12-10

## 2022-12-05 RX ORDER — DEXTROMETHORPHAN HYDROBROMIDE AND PROMETHAZINE HYDROCHLORIDE 15; 6.25 MG/5ML; MG/5ML
5 SOLUTION ORAL 3 TIMES DAILY PRN
Qty: 125 ML | Refills: 0 | Status: SHIPPED | OUTPATIENT
Start: 2022-12-05

## 2022-12-05 NOTE — PROGRESS NOTES
Assessment/Plan:             1  Tracheitis  -     azithromycin (Zithromax) 250 mg tablet; Take 2 tablets (500 mg total) by mouth daily for 1 day, THEN 1 tablet (250 mg total) daily for 4 days   -     Promethazine-DM (PHENERGAN-DM) 6 25-15 mg/5 mL oral syrup; Take 5 mL by mouth 3 (three) times a day as needed for cough         Subjective:      Patient ID: Margaret Romero is a 75 y o  female  Cough, with yellow greent sputum, congestion,      The following portions of the patient's history were reviewed and updated as appropriate: She  has a past medical history of Degenerative joint disease of knee, Depression with anxiety, Diabetes mellitus ([address] ), Diabetes mellitus due to underlying condition with diabetic nephropathy ([address] ), Dysthymic disorder, Esophageal reflux, Essential (primary) hypertension, Fibromyalgia, Gastroesophageal reflux disease, Hypercholesterolemia, Hyperlipidemia, Hypertension, essential, Influenza vaccination declined, Obstructive sleep apnea (adult) (pediatric), Osteoporosis, 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[address] ), Other specified disorders of white blood cells, Pneumococcal vaccination declined, Psoriatic arthropathy ([address] ), Rheumatoid arthritis ([address] ), 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[address] ), Right knee pain, Sleep apnea, Tietze's disease, and Type II diabetes mellitus, uncontrolled    She   Patient Active Problem List    Diagnosis Date Noted   • Tracheitis 12/05/2022   • Depression, recurrent ([address] ) 07/18/2022   • Otalgia 02/03/2022   • Trigeminal neuralgia of left side of face 01/20/2022   • Primary osteoarthritis of both knees 09/16/2021   • Smoking 09/09/2021   • Arthritis 09/09/2021   • Encounter for screening mammogram for malignant neoplasm of breast 01/14/2021   • Body mass index 31 0-31 9, adult 01/14/2021   • Pneumococcal vaccination declined    • Influenza vaccination declined    • Uncontrolled type 2 diabetes mellitus with hyperglycemia ([address] )    • 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HCC)    • 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HCC)    • Obstructive sleep apnea syndrome    • Hypertension, essential    • Mixed hyperlipidemia    • Gastroesophageal reflux disease    • Dysthymic disorder    • Osteoporosis    • Diabetes mellitus due to underlying condition with diabetic nephropathy, without long-term current use of insulin ([facility] )      She  has a past surgical history that includes Hysterectomy; Cholecystectomy; Colonoscopy (01/12/2012); and Mammo (historical) (02/04/2016)  Her family history includes Breast cancer in her sister; Cancer in her sister; Diabetes in her mother; Stroke in her father  She  reports that she has quit smoking  Her smoking use included cigarettes  She smoked an average of  25 packs per day  She has quit using smokeless tobacco  She reports that she does not drink alcohol and does not use drugs  Current Outpatient Medications   Medication Sig Dispense Refill   • aspirin (ECOTRIN LOW STRENGTH) 81 mg EC tablet Take 81 mg by mouth daily     • azithromycin (Zithromax) 250 mg tablet Take 2 tablets (500 mg total) by mouth daily for 1 day, THEN 1 tablet (250 mg total) daily for 4 days  6 tablet 0   • metFORMIN (GLUCOPHAGE) 1000 MG tablet Take 1 tablet (1,000 mg total) by mouth 2 (two) times a day with meals 180 tablet 1   • olmesartan (BENICAR) 40 mg tablet Take 0 5 tablets (20 mg total) by mouth 2 (two) times a day 90 tablet 1   • Promethazine-DM (PHENERGAN-DM) 6 25-15 mg/5 mL oral syrup Take 5 mL by mouth 3 (three) times a day as needed for cough 125 mL 0   • rosuvastatin (CRESTOR) 5 mg tablet Take 1 tablet (5 mg total) by mouth daily 90 tablet 0   • naproxen (Naprosyn) 500 mg tablet Take 1 tablet (500 mg total) by mouth 2 (two) times a day with meals (Patient not taking: Reported on 12/5/2022) 14 tablet 0     No current facility-administered medications for this visit       Current Outpatient Medications on File Prior to Visit   Medication Sig   • aspirin ([place] LOW STRENGTH) 81 mg EC tablet Take 81 mg by mouth daily   • metFORMIN (GLUCOPHAGE) 1000 MG tablet Take 1 tablet (1,000 mg total) by mouth 2 (two) times a day with meals   • olmesartan (BENICAR) 40 mg tablet Take 0 5 tablets (20 mg total) by mouth 2 (two) times a day   • rosuvastatin (CRESTOR) 5 mg tablet Take 1 tablet (5 mg total) by mouth daily   • naproxen (Naprosyn) 500 mg tablet Take 1 tablet (500 mg total) by mouth 2 (two) times a day with meals (Patient not taking: Reported on 12/5/2022)     No current facility-administered medications on file prior to visit  She is allergic to nuts - food allergy, atorvastatin, ezetimibe, latex, lisinopril, methotrexate, other, penicillins, pravastatin, and shellfish allergy - food allergy       Review of Systems   Constitutional: Negative for chills and fever  HENT: Positive for congestion  Negative for ear pain and sore throat  Eyes: Negative for pain  Respiratory: Positive for cough  Negative for shortness of breath  Cardiovascular: Negative for chest pain and leg swelling  Gastrointestinal: Negative for abdominal pain, nausea and vomiting  Endocrine: Negative for polyuria  Genitourinary: Negative for difficulty urinating, frequency and urgency  Musculoskeletal: Negative for arthralgias and back pain  Skin: Negative for rash  Neurological: Negative for weakness and headaches  Psychiatric/Behavioral: Negative for sleep disturbance  The patient is not nervous/anxious  Objective:      /76 (BP Location: Right arm, Patient Position: Sitting, Cuff Size: Standard)   Pulse 91   Temp 97 6 °F (36 4 °C)   Ht 5' 1" (1 549 m)   Wt 74 8 kg (165 lb)   SpO2 98%   BMI 31 18 kg/m²     No results found for this or any previous visit (from the past 1344 hour(s))  Physical Exam  Constitutional:       Appearance: Normal appearance  HENT:      Head: Normocephalic        Right Ear: Tympanic membrane, ear canal and external ear normal       Left Ear: Tympanic membrane, ear canal and external ear normal       Nose: Nose normal  No congestion  Mouth/Throat:      Mouth: Mucous membranes are moist       Pharynx: Oropharynx is clear  No oropharyngeal exudate or posterior oropharyngeal erythema  Eyes:      Extraocular Movements: Extraocular movements intact  Conjunctiva/sclera: Conjunctivae normal       Pupils: Pupils are equal, round, and reactive to light  Cardiovascular:      Rate and Rhythm: Normal rate and regular rhythm  Heart sounds: Normal heart sounds  No murmur heard  Pulmonary:      Effort: Pulmonary effort is normal       Breath sounds: Wheezing present  No rales  Abdominal:      General: Bowel sounds are normal  There is no distension  Palpations: Abdomen is soft  Tenderness: There is no abdominal tenderness  Musculoskeletal:         General: Normal range of motion  Cervical back: Normal range of motion and neck supple  Right lower leg: No edema  Left lower leg: No edema  Lymphadenopathy:      Cervical: No cervical adenopathy  Skin:     General: Skin is warm  Neurological:      General: No focal deficit present  Mental Status: She is alert and oriented to person, place, and time

## 2022-12-13 ENCOUNTER — VBI (OUTPATIENT)
Dept: ADMINISTRATIVE | Facility: OTHER | Age: 67
End: 2022-12-13

## 2022-12-13 ENCOUNTER — TELEPHONE (OUTPATIENT)
Dept: INTERNAL MEDICINE CLINIC | Facility: CLINIC | Age: 67
End: 2022-12-13

## 2022-12-13 ENCOUNTER — HOSPITAL ENCOUNTER (OUTPATIENT)
Dept: RADIOLOGY | Facility: HOSPITAL | Age: 67
Discharge: HOME | End: 2022-12-13
Attending: INTERNAL MEDICINE

## 2022-12-13 ENCOUNTER — TELEPHONE (OUTPATIENT)
Dept: OTHER | Facility: OTHER | Age: 67
End: 2022-12-13

## 2022-12-13 DIAGNOSIS — R06.02 SOB (SHORTNESS OF BREATH): ICD-10-CM

## 2022-12-13 DIAGNOSIS — R05.1 ACUTE COUGH: ICD-10-CM

## 2022-12-13 DIAGNOSIS — J04.10 TRACHEITIS: Primary | ICD-10-CM

## 2022-12-13 DIAGNOSIS — R05.1 ACUTE COUGH: Primary | ICD-10-CM

## 2022-12-13 RX ORDER — PREDNISONE 50 MG/1
50 TABLET ORAL DAILY
Qty: 7 TABLET | Refills: 0 | Status: SHIPPED | OUTPATIENT
Start: 2022-12-13 | End: 2022-12-20

## 2022-12-13 NOTE — TELEPHONE ENCOUNTER
Patient is not any better from last week appointment with flu symptoms  Cough is still bad, she gets coughing attacks, ribs hurt, headache  She took a Z-ashely and used all the cough medication

## 2023-01-05 ENCOUNTER — TELEPHONE (OUTPATIENT)
Dept: INTERNAL MEDICINE CLINIC | Facility: CLINIC | Age: 68
End: 2023-01-05

## 2023-01-05 NOTE — TELEPHONE ENCOUNTER
Patient had taken the antibiotic you had ordered for her  She is now experiencing vaginal itching since she has finished the medication  She bought Monestat 7 over the counter, but it did not help her       CB# [phone]

## 2023-01-06 ENCOUNTER — TELEPHONE (OUTPATIENT)
Dept: INTERNAL MEDICINE CLINIC | Facility: CLINIC | Age: 68
End: 2023-01-06

## 2023-01-06 DIAGNOSIS — B49 FUNGAL INFECTION: Primary | ICD-10-CM

## 2023-01-06 RX ORDER — FLUCONAZOLE 150 MG/1
150 TABLET ORAL DAILY
Qty: 3 TABLET | Refills: 0 | Status: SHIPPED | OUTPATIENT
Start: 2023-01-06 | End: 2023-01-09

## 2023-01-06 NOTE — TELEPHONE ENCOUNTER
patient called and stated she was on antibiotics and steroids and she got a yeast infection she also gets them after taking antibiotics she was wondering if you could call her in something for that to   [facility] on [facility]

## 2023-01-20 ENCOUNTER — HOSPITAL ENCOUNTER (OUTPATIENT)
Dept: RADIOLOGY | Age: 68
Discharge: HOME | End: 2023-01-20

## 2023-01-20 VITALS — BODY MASS INDEX: 31.15 KG/M2 | HEIGHT: 61 IN | WEIGHT: 165 LBS

## 2023-01-20 DIAGNOSIS — M81.0 SENILE OSTEOPOROSIS: ICD-10-CM

## 2023-01-20 DIAGNOSIS — Z12.31 ENCOUNTER FOR SCREENING MAMMOGRAM FOR MALIGNANT NEOPLASM OF BREAST: ICD-10-CM

## 2023-01-31 ENCOUNTER — RA CDI HCC (OUTPATIENT)
Dept: OTHER | Facility: HOSPITAL | Age: 68
End: 2023-01-31

## 2023-01-31 NOTE — PROGRESS NOTES
[facility]  coding opportunities       Chart reviewed, no opportunity found:   [address]        Patients Insurance     Medicare Insurance: [employer]

## 2023-02-06 ENCOUNTER — OFFICE VISIT (OUTPATIENT)
Dept: INTERNAL MEDICINE CLINIC | Facility: CLINIC | Age: 68
End: 2023-02-06

## 2023-02-06 VITALS
BODY MASS INDEX: 30.58 KG/M2 | OXYGEN SATURATION: 97 % | HEART RATE: 72 BPM | WEIGHT: 162 LBS | SYSTOLIC BLOOD PRESSURE: 112 MMHG | DIASTOLIC BLOOD PRESSURE: 70 MMHG | TEMPERATURE: 98.3 F | HEIGHT: 61 IN

## 2023-02-06 DIAGNOSIS — Z00.00 MEDICARE ANNUAL WELLNESS VISIT, SUBSEQUENT: ICD-10-CM

## 2023-02-06 DIAGNOSIS — F33.9 DEPRESSION, RECURRENT (HCC): ICD-10-CM

## 2023-02-06 DIAGNOSIS — I10 HYPERTENSION, ESSENTIAL: ICD-10-CM

## 2023-02-06 DIAGNOSIS — E78.2 MIXED HYPERLIPIDEMIA: ICD-10-CM

## 2023-02-06 DIAGNOSIS — M05.40 RHEUMATOID MYOPATHY WITH RHEUMATOID ARTHRITIS (HCC): ICD-10-CM

## 2023-02-06 DIAGNOSIS — E11.65 UNCONTROLLED TYPE 2 DIABETES MELLITUS WITH HYPERGLYCEMIA (HCC): Primary | ICD-10-CM

## 2023-02-06 RX ORDER — ROSUVASTATIN CALCIUM 5 MG/1
5 TABLET, COATED ORAL DAILY
Qty: 90 TABLET | Refills: 0 | Status: SHIPPED | OUTPATIENT
Start: 2023-02-06

## 2023-02-06 RX ORDER — OLMESARTAN MEDOXOMIL 40 MG/1
20 TABLET ORAL 2 TIMES DAILY
Qty: 90 TABLET | Refills: 1 | Status: SHIPPED | OUTPATIENT
Start: 2023-02-06

## 2023-02-17 NOTE — PROGRESS NOTES
Assessment and Plan:     Problem List Items Addressed This Visit        Endocrine    Uncontrolled type 2 diabetes mellitus with hyperglycemia ([facility] ) - Primary    Relevant Medications    metFORMIN (GLUCOPHAGE) 1000 MG tablet    Other Relevant Orders    CBC and differential    Comprehensive metabolic panel    Hemoglobin A1C    Lipid Panel with Direct LDL reflex    Microalbumin / creatinine urine ratio    TSH, 3rd generation       Cardiovascular and Mediastinum    Hypertension, essential    Relevant Medications    olmesartan (BENICAR) 40 mg tablet       Musculoskeletal and Integument    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[facility] )       Other    Mixed hyperlipidemia    Relevant Medications    rosuvastatin (CRESTOR) 5 mg tablet    Medicare annual wellness visit, subsequent    Depression, recurrent ([facility] )     BMI Counseling: Body mass index is 30 61 kg/m²  The BMI is above normal  Nutrition recommendations include decreasing portion sizes and encouraging healthy choices of fruits and vegetables  Exercise recommendations include moderate physical activity 150 minutes/week  Rationale for BMI follow-up plan is due to patient being overweight or obese  Preventive health issues were discussed with patient, and age appropriate screening tests were ordered as noted in patient's After Visit Summary  Personalized health advice and appropriate referrals for health education or preventive services given if needed, as noted in patient's After Visit Summary  History of Present Illness:     Patient presents for a Medicare Wellness Visit    Follow-up on multiple medical problems to ensure they are stable on current medication, also Medicare wellness exam     Patient Care Team:  Pratik Fajardo MD as PCP - General (Internal Medicine)  Pratik Fajardo MD as PCP - [address] [place] [address] (RTE)     Review of Systems:     Review of Systems   Constitutional: Negative for chills and fever     HENT: Negative for congestion, ear pain Pulmonary Clinic Note    Date of Service: 2/17/2023    Chief Complaint   Patient presents with     RECHECK     Abnormal CT scan of lung       A/P:  64M former smoker, CAD, Afib, COPD, pulmonary fibrosis being seen for dyspnea on exertion. This is certainly multifactorial; CAD, Afib, obesity, and lung disease. He follows w/ Dr. Marquez of cardiology. He just started pulmonary rehab. He has not yet seen rheumatology and I would like them to evaluate him for underlying causes of pulmonary fibrosis given some lab abnormalities noted on ILD panel. Pulmonary fibrosis may also be 2/2 asbestos exposure or idiopathic. If no underlying systemic disease is found he may be a candidate for anti-fibrotic medication although I would like him optimized prior to starting this and see his progression w/ pulmonary rehab and further cardiac management (if indicated).     - increase NUG-MONF-SCDL (Trelegy) to high dose, rinse mouth out after  - continue prn albuterol  - continue pulmonary rehab  - given contact number for rheumatology dept  - OK to substitute medications based on formulary     RTC 3mo w/ repeat PFTs    History:  64M HTN, HLD, DM2, Afib (on rivaroxaban) being seen for f/u dyspnea on exertion. Last seen 12/2022. RODRIGUEZ felt to be multifactorial; asthma/COPD, fibrosis, obesity, and cardiac disease. He is prescribed [id number] (Trelegy) and prn albuterol. He feels he is worsening. Checks SpO2 at home, always mid-high 90s. Has attended one pulmonary rehab session. No SOB at rest. RODRIGUEZ w/ minimal activity, improved now that he is on oxygen. Rare cough. No PND. He has slept in a recliner for some time now. Stable LE edema. Using Trelegy.     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  and sore throat  Eyes: Negative for pain  Respiratory: Negative for cough and shortness of breath  Cardiovascular: Negative for chest pain and leg swelling  Gastrointestinal: Negative for abdominal pain, nausea and vomiting  Endocrine: Negative for polyuria  Genitourinary: Negative for difficulty urinating, frequency and urgency  Musculoskeletal: Positive for arthralgias and back pain  Skin: Negative for rash  Neurological: Negative for weakness and headaches  Psychiatric/Behavioral: Negative for sleep disturbance  The patient is not nervous/anxious           Problem List:     Patient Active Problem List   Diagnosis   • Uncontrolled type 2 diabetes mellitus with hyperglycemia ([facility])   • 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[place] [address] )   • 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[place] [address] )   • Obstructive sleep apnea syndrome   • Hypertension, essential   • Mixed hyperlipidemia   • Gastroesophageal reflux disease   • Dysthymic disorder   • Osteoporosis   • Diabetes mellitus due to underlying condition with diabetic nephropathy, without long-term current use of insulin ([facility])   • Pneumococcal vaccination declined   • Influenza vaccination declined   • Medicare annual wellness visit, subsequent   • Body mass index 31 0-31 9, adult   • Smoking   • Arthritis   • Primary osteoarthritis of both knees   • Trigeminal neuralgia of left side of face   • Otalgia   • Depression, recurrent ([place] [address] )   • Tracheitis      Past Medical and Surgical History:     Past Medical History:   Diagnosis Date   • Degenerative joint disease of knee     right more than left    • Depression with anxiety    • Diabetes mellitus ([place] [address] )    • Diabetes mellitus due to underlying condition with diabetic nephropathy ([place] [address] )    • Dysthymic disorder    • Esophageal reflux    • Essential (primary) hypertension    • Fibromyalgia    • Gastroesophageal reflux disease    • Hypercholesterolemia    • Hyperlipidemia    • Hypertension, essential    • Influenza younger and thinks he had various exposures then    Occupation:                            10 point review of systems negative, aside from that mentioned in HPI.    /83 (BP Location: Left arm, Patient Position: Sitting, Cuff Size: Adult Large)   Pulse 106   Wt 121.6 kg (268 lb)   SpO2 91%   BMI 38.45 kg/m    Gen: well-appearing  HEENT: anicteric   Card: RRR  Pulm: diminished bilaterally   Abd: soft  MSK: 2+ edema, no acute joint abnormality   Skin: no obvious rash  Psych: normal affect  Neuro: alert and oriented     Labs:  Personally reviewed  ANCA (12/2022) - 1:10  CRP (12/2022) - 35.8  LISA (12/2022) - positive, 1:160 speckled     Imaging/Studies: Personally reviewed  CT Chest (12/2022) - small b/l effusions, mild diffuse GGOs, calcified pleural plaques b/l, mild fibrotic changes similar to previous, cardiac enlargement, severe coronary artery calcification  PFTs (7/2022) - severe obstruction and restriction, e/o air-trapping, moderate diffusion defect  CTPE (3/2022) - no PE, asbestos related pleural dz w/ minimal adjacent fibrosis, mild GGOs b/l     TTE (2/2022) - EF 50-55%, mild LVH, moderate biatrial enlargement    Past Medical History:   Diagnosis Date     Benign essential hypertension      CAD (coronary artery disease)      Chronic kidney disease, stage III (moderate) (H)      Obesity (BMI 30-39.9)      Persistent atrial fibrillation (H)      Pure hypercholesterolemia      Type 2 diabetes mellitus (H)      Past Surgical History:   Procedure Laterality Date     COLONOSCOPY N/A 8/24/2018    Procedure: COMBINED COLONOSCOPY, SINGLE OR MULTIPLE BIOPSY/POLYPECTOMY BY BIOPSY;;  Surgeon: Lawrence Mata MD;  Location:  GI     CV CORONARY ANGIOGRAM N/A 11/18/2022    Procedure: Coronary Angiogram;  Surgeon: Mason Lucas MD;  Location:  HEART CARDIAC CATH LAB     CV INSTANTANEOUS WAVE-FREE RATIO N/A 11/18/2022    Procedure: Instantaneous Wave-Free Ratio;  Surgeon: Shayy  Mason MATHIAS MD;  Location:  HEART CARDIAC CATH LAB     CV LEFT HEART CATH N/A 2022    Procedure: Left Heart Catheterization;  Surgeon: Mason Lucas MD;  Location:  HEART CARDIAC CATH LAB     Family History   Problem Relation Age of Onset     Diabetes Type 2  Mother      Cerebral aneurysm Sister      Bladder Cancer Brother      Myocardial Infarction Brother         in his 50s     Cerebrovascular Disease No family hx of      Coronary Artery Disease Early Onset No family hx of      Prostate Cancer No family hx of      Colon Cancer No family hx of      Clotting Disorder No family hx of      Bleeding Disorder No family hx of      Anesthesia Reaction No family hx of      Social History     Socioeconomic History     Marital status:      Spouse name: Not on file     Number of children: Not on file     Years of education: Not on file     Highest education level: Not on file   Occupational History     Occupation: Shipping & Receiving   Tobacco Use     Smoking status: Former     Packs/day: 2.00     Years: 16.00     Pack years: 32.00     Types: Cigarettes     Quit date: 1987     Years since quittin.4     Smokeless tobacco: Never   Vaping Use     Vaping Use: Never used   Substance and Sexual Activity     Alcohol use: No     Drug use: No     Sexual activity: Yes     Partners: Female   Other Topics Concern     Parent/sibling w/ CABG, MI or angioplasty before 65F 55M? Yes      Service No     Blood Transfusions No     Caffeine Concern No     Occupational Exposure No     Hobby Hazards No     Sleep Concern No     Stress Concern No     Weight Concern Yes     Special Diet No     Back Care No     Exercise Yes     Bike Helmet No     Comment: n/a     Seat Belt Yes     Self-Exams No   Social History Narrative    .    Two adult children.    Four grandchildren.    No formal exercise.     Social Determinants of Health     Financial Resource Strain: Not on file   Food Insecurity: Not on file  vaccination declined    • Obstructive sleep apnea (adult) (pediatric)    • Osteoporosis    • 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HCC)    • Other specified disorders of white blood cells    • Pneumococcal vaccination declined    • Psoriatic arthropathy (HCC)    • Rheumatoid arthritis (HCC)    • 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HCC)    • Right knee pain    • Sleep apnea    • Tietze's disease    • Type II diabetes mellitus, uncontrolled      Past Surgical History:   Procedure Laterality Date   • CHOLECYSTECTOMY     • COLONOSCOPY  01/12/2012    Abnormal    • HYSTERECTOMY     • MAMMO (HISTORICAL)  02/04/2016    Normal       Family History:     Family History   Problem Relation Age of Onset   • Diabetes Mother    • Stroke Father    • Breast cancer Sister 62   • Cancer Sister         Intestine   • No Known Problems Maternal Grandmother    • No Known Problems Paternal Grandmother    • No Known Problems Maternal Aunt    • No Known Problems Maternal Aunt    • No Known Problems Maternal Aunt    • No Known Problems Maternal Aunt    • No Known Problems Maternal Aunt    • No Known Problems Maternal Aunt    • Breast cancer Cousin         in her 42's   • No Known Problems Paternal Aunt       Social History:     Social History     Socioeconomic History   • Marital status: /Civil Union     Spouse name: None   • Number of children: None   • Years of education: None   • Highest education level: None   Occupational History   • None   Tobacco Use   • Smoking status: Former     Packs/day: 0 25     Years: 40 00     Pack years: 10 00     Types: Cigarettes   • Smokeless tobacco: Former   • Tobacco comments:     Curernt every day smoker - As per [employer]    Vaping Use   • Vaping Use: Never used   Substance and Sexual Activity   • Alcohol use: Never   • Drug use: Never   • Sexual activity: None   Other Topics Concern   • None   Social History Narrative    Travel outside [place]: No      Social Determinants of Health   Transportation Needs: Not on file   Physical Activity: Not on file   Stress: Not on file   Social Connections: Not on file   Intimate Partner Violence: Not on file   Housing Stability: Not on file       35 minutes spent reviewing chart, reviewing test results, talking with and examining patient, formulating plan, and documentation on the day of the encounter.    Booker Hopkins MD  Pulmonary and Critical Care Medicine  [facility]      Financial Resource Strain: Low Risk    • Difficulty of Paying Living Expenses: Not hard at all   Food Insecurity: Not on file   Transportation Needs: No Transportation Needs   • Lack of Transportation (Medical): No   • Lack of Transportation (Non-Medical): No   Physical Activity: Not on file   Stress: Not on file   Social Connections: Not on file   Intimate Partner Violence: Not on file   Housing Stability: Not on file      Medications and Allergies:     Current Outpatient Medications   Medication Sig Dispense Refill   • aspirin (ECOTRIN LOW STRENGTH) 81 mg EC tablet Take 81 mg by mouth daily     • metFORMIN (GLUCOPHAGE) 1000 MG tablet Take 1 tablet (1,000 mg total) by mouth 2 (two) times a day with meals 180 tablet 1   • olmesartan (BENICAR) 40 mg tablet Take 0 5 tablets (20 mg total) by mouth 2 (two) times a day 90 tablet 1   • rosuvastatin (CRESTOR) 5 mg tablet Take 1 tablet (5 mg total) by mouth daily 90 tablet 0   • naproxen (Naprosyn) 500 mg tablet Take 1 tablet (500 mg total) by mouth 2 (two) times a day with meals (Patient not taking: Reported on 12/5/2022) 14 tablet 0   • Promethazine-DM (PHENERGAN-DM) 6 25-15 mg/5 mL oral syrup Take 5 mL by mouth 3 (three) times a day as needed for cough (Patient not taking: Reported on 2/6/2023) 125 mL 0     No current facility-administered medications for this visit       Allergies   Allergen Reactions   • Nuts - Food Allergy Anaphylaxis   • Atorvastatin      Skin itchy   • Ezetimibe    • Latex    • Lisinopril    • Methotrexate    • Other Abdominal Pain   • Penicillins    • Pravastatin    • Shellfish Allergy - Food Allergy       Immunizations:     Immunization History   Administered Date(s) Administered   • COVID-19 PFIZER VACCINE 0 3 ML IM 04/08/2021, 04/28/2021      Health Maintenance:         Topic Date Due   • Breast Cancer Screening: Mammogram  01/20/2024   • Colorectal Cancer Screening  09/09/2026   • Hepatitis C Screening  Completed         Topic Date Due   • Pneumococcal Vaccine: 65+ Years (1 - PCV) Never done   • COVID-19 Vaccine (3 - Booster for Pfizer series) 06/23/2021   • Influenza Vaccine (1) Never done      Medicare Screening Tests and Risk Assessments:     Prince Simmons is here for her Subsequent Wellness visit  Last Medicare Wellness visit information reviewed, patient interviewed and updates made to the record today  Health Risk Assessment:   Patient rates overall health as fair  Patient feels that their physical health rating is same  Patient is satisfied with their life  Eyesight was rated as slightly worse  Hearing was rated as slightly worse  Patient feels that their emotional and mental health rating is slightly worse  Patients states they are sometimes angry  Patient states they are often unusually tired/fatigued  Pain experienced in the last 7 days has been some  Patient's pain rating has been 4/10  Patient states that she has experienced no weight loss or gain in last 6 months  Fall Risk Screening: In the past year, patient has experienced: no history of falling in past year      Urinary Incontinence Screening:   Patient has leaked urine accidently in the last six months  Home Safety:  Patient does not have trouble with stairs inside or outside of their home  Patient has working smoke alarms and has working carbon monoxide detector  Home safety hazards include: none  Nutrition:   Current diet is Low Cholesterol and Diabetic  Medications:   Patient is currently taking over-the-counter supplements  OTC medications include: see medication list  Patient is able to manage medications  Activities of Daily Living (ADLs)/Instrumental Activities of Daily Living (IADLs):   Walk and transfer into and out of bed and chair?: Yes  Dress and groom yourself?: Yes    Bathe or shower yourself?: Yes    Feed yourself?  Yes  Do your laundry/housekeeping?: Yes  Manage your money, pay your bills and track your expenses?: Yes  Make your own meals?: Yes    Do your own shopping?: No    Previous Hospitalizations:   Any hospitalizations or ED visits within the last 12 months?: No      Advance Care Planning:   Living will: No    Durable POA for healthcare: Yes    Five wishes given: Yes      PREVENTIVE SCREENINGS      Cardiovascular Screening:    General: Screening Not Indicated and History Lipid Disorder      Diabetes Screening:     General: Screening Not Indicated and History Diabetes      Colorectal Cancer Screening:     General: Screening Current      Breast Cancer Screening:     General: Screening Current      Cervical Cancer Screening:    General: Screening Not Indicated      Osteoporosis Screening:    General: Screening Not Indicated and History Osteoporosis      Lung Cancer Screening:     General: Screening Not Indicated      Hepatitis C Screening:    General: Screening Current    No results found  Physical Exam:     /70 (BP Location: Left arm, Patient Position: Sitting, Cuff Size: Large)   Pulse 72   Temp 98 3 °F (36 8 °C) (Temporal)   Ht 5' 1" (1 549 m)   Wt 73 5 kg (162 lb)   SpO2 97%   BMI 30 61 kg/m²     Physical Exam  Vitals and nursing note reviewed  Constitutional:       General: She is not in acute distress  Appearance: She is well-developed  HENT:      Head: Normocephalic and atraumatic  Right Ear: Tympanic membrane, ear canal and external ear normal       Left Ear: Tympanic membrane, ear canal and external ear normal       Mouth/Throat:      Pharynx: Oropharynx is clear  Eyes:      Extraocular Movements: Extraocular movements intact  Conjunctiva/sclera: Conjunctivae normal    Cardiovascular:      Rate and Rhythm: Normal rate and regular rhythm  Heart sounds: Normal heart sounds  No murmur heard  Pulmonary:      Effort: Pulmonary effort is normal  No respiratory distress  Breath sounds: Normal breath sounds  Abdominal:      General: Abdomen is flat  Palpations: Abdomen is soft  Tenderness:  There is no abdominal tenderness  Musculoskeletal:         General: No swelling  Normal range of motion  Cervical back: Normal range of motion and neck supple  Right lower leg: No edema  Left lower leg: Edema present  Skin:     General: Skin is warm and dry  Capillary Refill: Capillary refill takes less than 2 seconds  Neurological:      General: No focal deficit present  Mental Status: She is alert and oriented to person, place, and time     Psychiatric:         Mood and Affect: Mood normal           Malena Wolfe MD

## 2023-02-24 ENCOUNTER — OFFICE VISIT (OUTPATIENT)
Dept: INTERNAL MEDICINE CLINIC | Facility: CLINIC | Age: 68
End: 2023-02-24

## 2023-02-24 VITALS
OXYGEN SATURATION: 97 % | DIASTOLIC BLOOD PRESSURE: 82 MMHG | SYSTOLIC BLOOD PRESSURE: 122 MMHG | TEMPERATURE: 98.2 F | HEART RATE: 91 BPM | WEIGHT: 160 LBS | BODY MASS INDEX: 30.21 KG/M2 | HEIGHT: 61 IN

## 2023-02-24 DIAGNOSIS — L08.9 SKIN INFECTION: Primary | ICD-10-CM

## 2023-02-24 DIAGNOSIS — R35.0 URINARY FREQUENCY: ICD-10-CM

## 2023-02-24 DIAGNOSIS — E11.65 UNCONTROLLED TYPE 2 DIABETES MELLITUS WITH HYPERGLYCEMIA (HCC): ICD-10-CM

## 2023-02-24 LAB — SL AMB POCT HEMOGLOBIN AIC: 11.1 (ref ?–6.5)

## 2023-02-24 RX ORDER — DOXYCYCLINE HYCLATE 100 MG/1
100 CAPSULE ORAL EVERY 12 HOURS SCHEDULED
Qty: 14 CAPSULE | Refills: 0 | Status: SHIPPED | OUTPATIENT
Start: 2023-02-24 | End: 2023-03-03

## 2023-02-24 NOTE — PROGRESS NOTES
Assessment/Plan:    Diagnoses and all orders for this visit:    Skin infection  -     doxycycline hyclate (VIBRAMYCIN) 100 mg capsule; Take 1 capsule (100 mg total) by mouth every 12 (twelve) hours for 7 days    Urinary frequency  -     Urine culture; Future    Uncontrolled type 2 diabetes mellitus with hyperglycemia (HCC)  -     POCT hemoglobin A1c       Superficial skin infection on the pubis, no fluctuance/systemic signs noted  Will treat with empiric antibiotic  HbA1c of 11 1 today, notes compliance with medications  Has polyuria polydipsia and dry mouth  Discussed with patient and  that diabetic medications need up titration, patient wants to discuss with the primary PCP on Monday after completing blood work  Dr Rosa Hickman made aware  Recommend to seek prompt medical attention if patient develops a fever or chills, or other new symptoms develop  There are no Patient Instructions on file for this visit  Subjective:      Patient ID: Yvonne Quiroga is a 75 y o  female    Complains of skin infection on the left pubic area with minimal swelling, redness and pain for the last few days  No fever chills, shortness of breath or chest pain  Has lately been experiencing dry mouth, polyuria, random blood sugar about a week ago was 497    HbA1c today 11 17        Current Outpatient Medications:   •  aspirin (ECOTRIN LOW STRENGTH) 81 mg EC tablet, Take 81 mg by mouth daily, Disp: , Rfl:   •  doxycycline hyclate (VIBRAMYCIN) 100 mg capsule, Take 1 capsule (100 mg total) by mouth every 12 (twelve) hours for 7 days, Disp: 14 capsule, Rfl: 0  •  metFORMIN (GLUCOPHAGE) 1000 MG tablet, Take 1 tablet (1,000 mg total) by mouth 2 (two) times a day with meals, Disp: 180 tablet, Rfl: 1  •  olmesartan (BENICAR) 40 mg tablet, Take 0 5 tablets (20 mg total) by mouth 2 (two) times a day, Disp: 90 tablet, Rfl: 1  •  rosuvastatin (CRESTOR) 5 mg tablet, Take 1 tablet (5 mg total) by mouth daily, Disp: 90 tablet, Rfl: 0  •  naproxen (Naprosyn) 500 mg tablet, Take 1 tablet (500 mg total) by mouth 2 (two) times a day with meals (Patient not taking: Reported on 12/5/2022), Disp: 14 tablet, Rfl: 0  •  Promethazine-DM (PHENERGAN-DM) 6 25-15 mg/5 mL oral syrup, Take 5 mL by mouth 3 (three) times a day as needed for cough (Patient not taking: Reported on 2/6/2023), Disp: 125 mL, Rfl: 0     Past Medical History:   Diagnosis Date   • Degenerative joint disease of knee     right more than left    • Depression with anxiety    • Diabetes mellitus ([facility] )    • Diabetes mellitus due to underlying condition with diabetic nephropathy ([facility] )    • Dysthymic disorder    • Esophageal reflux    • Essential (primary) hypertension    • Fibromyalgia    • Gastroesophageal reflux disease    • Hypercholesterolemia    • Hyperlipidemia    • Hypertension, essential    • Influenza vaccination declined    • Obstructive sleep apnea (adult) (pediatric)    • Osteoporosis    • 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[facility] )    • Other specified disorders of white blood cells    • Pneumococcal vaccination declined    • Psoriatic arthropathy ([facility] )    • Rheumatoid arthritis ([facility] )    • 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[facility] )    • Right knee pain    • Sleep apnea    • Tietze's disease    • Type II diabetes mellitus, uncontrolled          Past Surgical History:   Procedure Laterality Date   • CHOLECYSTECTOMY     • COLONOSCOPY  01/12/2012    Abnormal    • HYSTERECTOMY     • MAMMO (HISTORICAL)  02/04/2016    Normal          Allergies   Allergen Reactions   • Nuts - Food Allergy Anaphylaxis   • Atorvastatin      Skin itchy   • Ezetimibe    • Latex    • Lisinopril    • Methotrexate    • Other Abdominal Pain   • Penicillins    • Pravastatin    • Shellfish Allergy - Food Allergy        Recent Results (from the past 1008 hour(s))   POCT hemoglobin A1c    Collection Time: 02/24/23 12:55 PM   Result Value Ref Range    Hemoglobin A1C 11 1 (A) 6 5       The following portions of the patient's history were reviewed and updated as appropriate: allergies, current medications, past family history, past medical history, past social history, past surgical history and problem list      Review of Systems   Constitutional: Negative for appetite change, chills, diaphoresis, fatigue, fever and unexpected weight change  Respiratory: Negative for apnea, cough, choking, chest tightness, shortness of breath, wheezing and stridor  Cardiovascular: Negative for chest pain, palpitations and leg swelling  Gastrointestinal: Negative for abdominal distention, abdominal pain, anal bleeding, blood in stool, constipation, diarrhea, nausea and vomiting  Genitourinary: Negative for decreased urine volume, difficulty urinating, frequency and urgency  Musculoskeletal: Negative for arthralgias, back pain and myalgias  Neurological: Negative for dizziness, light-headedness, numbness and headaches  Objective:      Vitals:    02/24/23 1120   BP: 122/82   Pulse: 91   Temp: 98 2 °F (36 8 °C)   SpO2: 97%          Physical Exam  Vitals reviewed  Constitutional:       General: She is not in acute distress  Appearance: Normal appearance  She is not ill-appearing, toxic-appearing or diaphoretic  HENT:      Mouth/Throat:      Mouth: Mucous membranes are moist    Cardiovascular:      Rate and Rhythm: Normal rate and regular rhythm  Pulses: Normal pulses  Heart sounds: Normal heart sounds  No murmur heard  No friction rub  No gallop  Pulmonary:      Effort: Pulmonary effort is normal  No respiratory distress  Breath sounds: Normal breath sounds  No stridor  No wheezing, rhonchi or rales  Chest:      Chest wall: No tenderness  Musculoskeletal:      Right lower leg: No edema  Left lower leg: No edema  Skin:     General: Skin is warm and dry  Findings: No lesion or rash  Neurological:      Mental Status: She is alert

## 2023-02-25 LAB
CREAT ?TM UR-SCNC: 199.6 UMOL/L
EXT MICROALBUMIN URINE RANDOM: 123.7
HBA1C MFR BLD HPLC: 11.1 %
MICROALBUMIN/CREAT UR: 619.7 MG/G{CREAT}

## 2023-02-27 ENCOUNTER — TELEPHONE (OUTPATIENT)
Dept: INTERNAL MEDICINE CLINIC | Facility: CLINIC | Age: 68
End: 2023-02-27

## 2023-02-27 ENCOUNTER — OFFICE VISIT (OUTPATIENT)
Dept: INTERNAL MEDICINE CLINIC | Facility: CLINIC | Age: 68
End: 2023-02-27

## 2023-02-27 VITALS
DIASTOLIC BLOOD PRESSURE: 80 MMHG | OXYGEN SATURATION: 97 % | HEIGHT: 61 IN | WEIGHT: 156 LBS | BODY MASS INDEX: 29.45 KG/M2 | HEART RATE: 95 BPM | TEMPERATURE: 97.5 F | SYSTOLIC BLOOD PRESSURE: 116 MMHG

## 2023-02-27 DIAGNOSIS — I10 HYPERTENSION, ESSENTIAL: ICD-10-CM

## 2023-02-27 DIAGNOSIS — E78.2 MIXED HYPERLIPIDEMIA: ICD-10-CM

## 2023-02-27 DIAGNOSIS — E11.65 UNCONTROLLED TYPE 2 DIABETES MELLITUS WITH HYPERGLYCEMIA (HCC): Primary | ICD-10-CM

## 2023-02-27 NOTE — PROGRESS NOTES
Assessment/Plan:             1  Uncontrolled type 2 diabetes mellitus with hyperglycemia (HCC)  -     sitaGLIPtin (JANUVIA) 50 mg tablet; Take 1 tablet (50 mg total) by mouth daily    2  Mixed hyperlipidemia    3  Hypertension, essential         Subjective:      Patient ID: Rory Mane is a 75 y o  female  Follow-up on blood test done on 2/25/2023 test discussed with her      The following portions of the patient's history were reviewed and updated as appropriate: She  has a past medical history of Degenerative joint disease of knee, Depression with anxiety, Diabetes mellitus ([address] ), Diabetes mellitus due to underlying condition with diabetic nephropathy ([address] ), Dysthymic disorder, Esophageal reflux, Essential (primary) hypertension, Fibromyalgia, Gastroesophageal reflux disease, Hypercholesterolemia, Hyperlipidemia, Hypertension, essential, Influenza vaccination declined, Obstructive sleep apnea (adult) (pediatric), Osteoporosis, 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[address] ), Other specified disorders of white blood cells, Pneumococcal vaccination declined, Psoriatic arthropathy ([address] ), Rheumatoid arthritis ([address] ), 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[address] ), Right knee pain, Sleep apnea, Tietze's disease, and Type II diabetes mellitus, uncontrolled    She   Patient Active Problem List    Diagnosis Date Noted   • Tracheitis 12/05/2022   • Depression, recurrent ([address] ) 07/18/2022   • Otalgia 02/03/2022   • Trigeminal neuralgia of left side of face 01/20/2022   • Primary osteoarthritis of both knees 09/16/2021   • Smoking 09/09/2021   • Arthritis 09/09/2021   • Medicare annual wellness visit, subsequent 01/14/2021   • Body mass index 31 0-31 9, adult 01/14/2021   • Pneumococcal vaccination declined    • Influenza vaccination declined    • Uncontrolled type 2 diabetes mellitus with hyperglycemia ([address] )    • 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[address] )    • 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[address] )    • Obstructive sleep apnea syndrome    • Hypertension, essential    • Mixed hyperlipidemia    • Gastroesophageal reflux disease    • Dysthymic disorder    • Osteoporosis    • Diabetes mellitus due to underlying condition with diabetic nephropathy, without long-term current use of insulin (HCC)      She  has a past surgical history that includes Hysterectomy; Cholecystectomy; Colonoscopy (01/12/2012); and Mammo (historical) (02/04/2016)  Her family history includes Breast cancer in her cousin; Breast cancer (age of onset: 62) in her sister; Cancer in her sister; Diabetes in her mother; No Known Problems in her maternal aunt, maternal aunt, maternal aunt, maternal aunt, maternal aunt, maternal aunt, maternal grandmother, paternal aunt, and paternal grandmother; Stroke in her father  She  reports that she has been smoking cigarettes  She started smoking about 50 years ago  She has a 25 00 pack-year smoking history  She has quit using smokeless tobacco  She reports that she does not drink alcohol and does not use drugs    Current Outpatient Medications   Medication Sig Dispense Refill   • aspirin (ECOTRIN LOW STRENGTH) 81 mg EC tablet Take 81 mg by mouth daily     • doxycycline hyclate (VIBRAMYCIN) 100 mg capsule Take 1 capsule (100 mg total) by mouth every 12 (twelve) hours for 7 days 14 capsule 0   • metFORMIN (GLUCOPHAGE) 1000 MG tablet Take 1 tablet (1,000 mg total) by mouth 2 (two) times a day with meals 180 tablet 1   • olmesartan (BENICAR) 40 mg tablet Take 0 5 tablets (20 mg total) by mouth 2 (two) times a day 90 tablet 1   • rosuvastatin (CRESTOR) 5 mg tablet Take 1 tablet (5 mg total) by mouth daily 90 tablet 0   • sitaGLIPtin (JANUVIA) 50 mg tablet Take 1 tablet (50 mg total) by mouth daily 30 tablet 1   • naproxen (Naprosyn) 500 mg tablet Take 1 tablet (500 mg total) by mouth 2 (two) times a day with meals (Patient not taking: Reported on 2/27/2023) 14 tablet 0   • Promethazine-DM (PHENERGAN-DM) 6 25-15 mg/5 mL oral syrup Take 5 mL by mouth 3 (three) times a day as needed for cough (Patient not taking: Reported on 2/6/2023) 125 mL 0     No current facility-administered medications for this visit  Current Outpatient Medications on File Prior to Visit   Medication Sig   • aspirin (ECOTRIN LOW STRENGTH) 81 mg EC tablet Take 81 mg by mouth daily   • doxycycline hyclate (VIBRAMYCIN) 100 mg capsule Take 1 capsule (100 mg total) by mouth every 12 (twelve) hours for 7 days   • metFORMIN (GLUCOPHAGE) 1000 MG tablet Take 1 tablet (1,000 mg total) by mouth 2 (two) times a day with meals   • olmesartan (BENICAR) 40 mg tablet Take 0 5 tablets (20 mg total) by mouth 2 (two) times a day   • rosuvastatin (CRESTOR) 5 mg tablet Take 1 tablet (5 mg total) by mouth daily   • naproxen (Naprosyn) 500 mg tablet Take 1 tablet (500 mg total) by mouth 2 (two) times a day with meals (Patient not taking: Reported on 2/27/2023)   • Promethazine-DM (PHENERGAN-DM) 6 25-15 mg/5 mL oral syrup Take 5 mL by mouth 3 (three) times a day as needed for cough (Patient not taking: Reported on 2/6/2023)     No current facility-administered medications on file prior to visit  She is allergic to nuts - food allergy, atorvastatin, ezetimibe, latex, lisinopril, methotrexate, other, penicillins, pravastatin, and shellfish allergy - food allergy       Review of Systems   Constitutional: Negative for chills and fever  HENT: Negative for congestion, ear pain and sore throat  Eyes: Negative for pain  Respiratory: Negative for cough and shortness of breath  Cardiovascular: Negative for chest pain and leg swelling  Gastrointestinal: Negative for abdominal pain, nausea and vomiting  Endocrine: Negative for polyuria  Genitourinary: Negative for difficulty urinating, frequency and urgency  Musculoskeletal: Positive for arthralgias and back pain  Skin: Negative for rash  Neurological: Negative for weakness and headaches     Psychiatric/Behavioral: Negative for sleep disturbance  The patient is not nervous/anxious  Objective:      /80 (BP Location: Right arm, Patient Position: Sitting, Cuff Size: Large)   Pulse 95   Temp 97 5 °F (36 4 °C) (Temporal)   Ht 5' 1" (1 549 m)   Wt 70 8 kg (156 lb)   SpO2 97%   BMI 29 48 kg/m²     Recent Results (from the past 1344 hour(s))   POCT hemoglobin A1c    Collection Time: 02/24/23 12:55 PM   Result Value Ref Range    Hemoglobin A1C 11 1 (A) 6 5   HEMOGLOBIN A1C    Collection Time: 02/25/23  8:11 AM   Result Value Ref Range    Hemoglobin A1C 11 1 (H) <5 7 %    eAG, EST AVG Glucose 272 mg/dL mg/dL        Physical Exam  Constitutional:       Appearance: Normal appearance  HENT:      Head: Normocephalic  Right Ear: Tympanic membrane, ear canal and external ear normal       Left Ear: Tympanic membrane, ear canal and external ear normal       Nose: Nose normal  No congestion  Mouth/Throat:      Mouth: Mucous membranes are moist       Pharynx: Oropharynx is clear  No oropharyngeal exudate or posterior oropharyngeal erythema  Eyes:      Extraocular Movements: Extraocular movements intact  Conjunctiva/sclera: Conjunctivae normal       Pupils: Pupils are equal, round, and reactive to light  Cardiovascular:      Rate and Rhythm: Normal rate and regular rhythm  Heart sounds: Normal heart sounds  No murmur heard  Pulmonary:      Effort: Pulmonary effort is normal       Breath sounds: Normal breath sounds  No wheezing or rales  Abdominal:      General: Bowel sounds are normal  There is no distension  Palpations: Abdomen is soft  Tenderness: There is no abdominal tenderness  Musculoskeletal:         General: Normal range of motion  Cervical back: Normal range of motion and neck supple  Right lower leg: No edema  Left lower leg: No edema  Lymphadenopathy:      Cervical: No cervical adenopathy  Skin:     General: Skin is warm     Neurological:      General: No focal deficit present  Mental Status: She is alert and oriented to person, place, and time

## 2023-02-28 DIAGNOSIS — E11.65 UNCONTROLLED TYPE 2 DIABETES MELLITUS WITH HYPERGLYCEMIA (HCC): ICD-10-CM

## 2023-03-27 ENCOUNTER — OFFICE VISIT (OUTPATIENT)
Dept: INTERNAL MEDICINE CLINIC | Facility: CLINIC | Age: 68
End: 2023-03-27

## 2023-03-27 VITALS
HEIGHT: 61 IN | OXYGEN SATURATION: 96 % | TEMPERATURE: 97.8 F | HEART RATE: 69 BPM | WEIGHT: 155 LBS | SYSTOLIC BLOOD PRESSURE: 114 MMHG | BODY MASS INDEX: 29.27 KG/M2 | DIASTOLIC BLOOD PRESSURE: 78 MMHG

## 2023-03-27 DIAGNOSIS — E11.65 UNCONTROLLED TYPE 2 DIABETES MELLITUS WITH HYPERGLYCEMIA (HCC): Primary | ICD-10-CM

## 2023-03-27 DIAGNOSIS — E78.2 MIXED HYPERLIPIDEMIA: ICD-10-CM

## 2023-03-27 DIAGNOSIS — Z01.00 ENCOUNTER FOR DIABETES TYPE 2 EYE EXAM (HCC): ICD-10-CM

## 2023-03-27 DIAGNOSIS — E11.9 ENCOUNTER FOR DIABETES TYPE 2 EYE EXAM (HCC): ICD-10-CM

## 2023-03-27 DIAGNOSIS — I10 HYPERTENSION, ESSENTIAL: ICD-10-CM

## 2023-03-27 DIAGNOSIS — E08.21 DIABETES MELLITUS DUE TO UNDERLYING CONDITION WITH DIABETIC NEPHROPATHY, WITHOUT LONG-TERM CURRENT USE OF INSULIN (HCC): ICD-10-CM

## 2023-03-27 DIAGNOSIS — M17.0 PRIMARY OSTEOARTHRITIS OF BOTH KNEES: ICD-10-CM

## 2023-03-27 NOTE — PROGRESS NOTES
Assessment/Plan:             1  Uncontrolled type 2 diabetes mellitus with hyperglycemia ([facility])  -     Hemoglobin A1C; Future    2  Encounter for diabetes type 2 eye exam [facility])  -     Ambulatory Referral to Ophthalmology; Future    3  Mixed hyperlipidemia    4  Diabetes mellitus due to underlying condition with diabetic nephropathy, without long-term current use of insulin ([address] )    5  Primary osteoarthritis of both knees    6  Hypertension, essential         Subjective:      Patient ID: Shade Ozuna is a 75 y o  female  Follow-up on multiple medical problems to ensure they are stable on current medication, home sugar readings reviewed      The following portions of the patient's history were reviewed and updated as appropriate: She  has a past medical history of Degenerative joint disease of knee, Depression with anxiety, Diabetes mellitus ([address] ), Diabetes mellitus due to underlying condition with diabetic nephropathy ([address] ), Dysthymic disorder, Esophageal reflux, Essential (primary) hypertension, Fibromyalgia, Gastroesophageal reflux disease, Hypercholesterolemia, Hyperlipidemia, Hypertension, essential, Influenza vaccination declined, Obstructive sleep apnea (adult) (pediatric), Osteoporosis, 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[address] ), Other specified disorders of white blood cells, Pneumococcal vaccination declined, Psoriatic arthropathy ([address] ), Rheumatoid arthritis ([address] ), 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[address] ), Right knee pain, Sleep apnea, Tietze's disease, and Type II diabetes mellitus, uncontrolled    She   Patient Active Problem List    Diagnosis Date Noted   • Tracheitis 12/05/2022   • Depression, recurrent ([address] ) 07/18/2022   • Otalgia 02/03/2022   • Trigeminal neuralgia of left side of face 01/20/2022   • Primary osteoarthritis of both knees 09/16/2021   • Smoking 09/09/2021   • Arthritis 09/09/2021   • Medicare annual wellness visit, subsequent 01/14/2021   • Body mass index 31 0-31 9, adult 01/14/2021   • Pneumococcal vaccination declined    • Influenza vaccination declined    • Uncontrolled type 2 diabetes mellitus with hyperglycemia (HCC)    • 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[facility] )    • 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HCC)    • Obstructive sleep apnea syndrome    • Hypertension, essential    • Mixed hyperlipidemia    • Gastroesophageal reflux disease    • Dysthymic disorder    • Osteoporosis    • Diabetes mellitus due to underlying condition with diabetic nephropathy, without long-term current use of insulin ([facility] )      She  has a past surgical history that includes Hysterectomy; Cholecystectomy; Colonoscopy (01/12/2012); and Mammo (historical) (02/04/2016)  Her family history includes Breast cancer in her cousin; Breast cancer (age of onset: 62) in her sister; Cancer in her sister; Diabetes in her mother; No Known Problems in her maternal aunt, maternal aunt, maternal aunt, maternal aunt, maternal aunt, maternal aunt, maternal grandmother, paternal aunt, and paternal grandmother; Stroke in her father  She  reports that she has been smoking cigarettes  She started smoking about 50 years ago  She has a 25 00 pack-year smoking history  She has quit using smokeless tobacco  She reports that she does not drink alcohol and does not use drugs  Current Outpatient Medications   Medication Sig Dispense Refill   • aspirin (ECOTRIN LOW STRENGTH) 81 mg EC tablet Take 81 mg by mouth daily     • metFORMIN (GLUCOPHAGE) 1000 MG tablet Take 1 tablet (1,000 mg total) by mouth 2 (two) times a day with meals 180 tablet 1   • olmesartan (BENICAR) 40 mg tablet Take 0 5 tablets (20 mg total) by mouth 2 (two) times a day 90 tablet 1   • rosuvastatin (CRESTOR) 5 mg tablet Take 1 tablet (5 mg total) by mouth daily 90 tablet 0   • sitaGLIPtin (JANUVIA) 100 mg tablet Take 1 tablet (100 mg total) by mouth daily 30 tablet 1     No current facility-administered medications for this visit       Current Outpatient "Medications on File Prior to Visit   Medication Sig   • aspirin (ECOTRIN LOW STRENGTH) 81 mg EC tablet Take 81 mg by mouth daily   • metFORMIN (GLUCOPHAGE) 1000 MG tablet Take 1 tablet (1,000 mg total) by mouth 2 (two) times a day with meals   • olmesartan (BENICAR) 40 mg tablet Take 0 5 tablets (20 mg total) by mouth 2 (two) times a day   • rosuvastatin (CRESTOR) 5 mg tablet Take 1 tablet (5 mg total) by mouth daily   • sitaGLIPtin (JANUVIA) 100 mg tablet Take 1 tablet (100 mg total) by mouth daily   • [DISCONTINUED] naproxen (Naprosyn) 500 mg tablet Take 1 tablet (500 mg total) by mouth 2 (two) times a day with meals   • [DISCONTINUED] Promethazine-DM (PHENERGAN-DM) 6 25-15 mg/5 mL oral syrup Take 5 mL by mouth 3 (three) times a day as needed for cough     No current facility-administered medications on file prior to visit  She is allergic to nuts - food allergy, atorvastatin, ezetimibe, latex, lisinopril, methotrexate, other, penicillins, pravastatin, and shellfish allergy - food allergy       Review of Systems   Constitutional: Negative for chills and fever  HENT: Negative for congestion, ear pain and sore throat  Eyes: Negative for pain  Respiratory: Negative for cough and shortness of breath  Cardiovascular: Negative for chest pain and leg swelling  Gastrointestinal: Negative for abdominal pain, nausea and vomiting  Endocrine: Negative for polyuria  Genitourinary: Negative for difficulty urinating, frequency and urgency  Musculoskeletal: Positive for arthralgias  Negative for back pain  Skin: Negative for rash  Neurological: Negative for weakness and headaches  Psychiatric/Behavioral: Negative for sleep disturbance  The patient is not nervous/anxious            Objective:      /78 (BP Location: Left arm, Patient Position: Sitting, Cuff Size: Standard)   Pulse 69   Temp 97 8 °F (36 6 °C) (Temporal)   Ht 5' 1\" (1 549 m)   Wt 70 3 kg (155 lb)   SpO2 96%   BMI 29 29 kg/m² " Recent Results (from the past 1344 hour(s))   POCT hemoglobin A1c    Collection Time: 02/24/23 12:55 PM   Result Value Ref Range    Hemoglobin A1C 11 1 (A) 6 5   Hemoglobin A1C    Collection Time: 02/25/23 12:00 AM   Result Value Ref Range    Hemoglobin A1C 11 1    Microalbumin / creatinine urine ratio    Collection Time: 02/25/23 12:00 AM   Result Value Ref Range    EXT Creatinine Urine 199 6     Microalbum  ,U,Random 123 7     EXTERNAL Microalb/Creat Ratio 619 7    HEMOGLOBIN A1C    Collection Time: 02/25/23  8:11 AM   Result Value Ref Range    Hemoglobin A1C 11 1 (H) <5 7 %    eAG, EST AVG Glucose 272 mg/dL mg/dL        Physical Exam  Constitutional:       Appearance: Normal appearance  HENT:      Head: Normocephalic  Right Ear: Tympanic membrane, ear canal and external ear normal       Left Ear: Tympanic membrane, ear canal and external ear normal       Nose: Nose normal  No congestion  Mouth/Throat:      Mouth: Mucous membranes are moist       Pharynx: Oropharynx is clear  No oropharyngeal exudate or posterior oropharyngeal erythema  Eyes:      Extraocular Movements: Extraocular movements intact  Conjunctiva/sclera: Conjunctivae normal       Pupils: Pupils are equal, round, and reactive to light  Cardiovascular:      Rate and Rhythm: Normal rate and regular rhythm  Heart sounds: Normal heart sounds  No murmur heard  Pulmonary:      Effort: Pulmonary effort is normal       Breath sounds: Normal breath sounds  No wheezing or rales  Abdominal:      General: Bowel sounds are normal  There is no distension  Palpations: Abdomen is soft  Tenderness: There is no abdominal tenderness  Musculoskeletal:         General: Normal range of motion  Cervical back: Normal range of motion and neck supple  Right lower leg: No edema  Left lower leg: No edema  Lymphadenopathy:      Cervical: No cervical adenopathy  Skin:     General: Skin is warm     Neurological: General: No focal deficit present  Mental Status: She is alert and oriented to person, place, and time

## 2023-04-07 PROBLEM — Z00.00 MEDICARE ANNUAL WELLNESS VISIT, SUBSEQUENT: Status: RESOLVED | Noted: 2021-01-14 | Resolved: 2023-04-07

## 2023-05-02 ENCOUNTER — RA CDI HCC (OUTPATIENT)
Dept: OTHER | Facility: HOSPITAL | Age: 68
End: 2023-05-02

## 2023-05-03 LAB — HBA1C MFR BLD HPLC: 7.3 %

## 2023-05-08 ENCOUNTER — OFFICE VISIT (OUTPATIENT)
Dept: INTERNAL MEDICINE CLINIC | Facility: CLINIC | Age: 68
End: 2023-05-08

## 2023-05-08 VITALS
TEMPERATURE: 98.9 F | BODY MASS INDEX: 28.7 KG/M2 | WEIGHT: 152 LBS | OXYGEN SATURATION: 97 % | SYSTOLIC BLOOD PRESSURE: 118 MMHG | HEART RATE: 79 BPM | HEIGHT: 61 IN | DIASTOLIC BLOOD PRESSURE: 60 MMHG

## 2023-05-08 DIAGNOSIS — E11.9 DIABETIC EYE EXAM (HCC): ICD-10-CM

## 2023-05-08 DIAGNOSIS — E78.2 MIXED HYPERLIPIDEMIA: ICD-10-CM

## 2023-05-08 DIAGNOSIS — E11.65 UNCONTROLLED TYPE 2 DIABETES MELLITUS WITH HYPERGLYCEMIA (HCC): Primary | ICD-10-CM

## 2023-05-08 DIAGNOSIS — M05.40 RHEUMATOID MYOPATHY WITH RHEUMATOID ARTHRITIS (HCC): ICD-10-CM

## 2023-05-08 DIAGNOSIS — I10 HYPERTENSION, ESSENTIAL: ICD-10-CM

## 2023-05-08 DIAGNOSIS — Z01.00 DIABETIC EYE EXAM (HCC): ICD-10-CM

## 2023-05-08 DIAGNOSIS — M17.0 PRIMARY OSTEOARTHRITIS OF BOTH KNEES: ICD-10-CM

## 2023-05-08 RX ORDER — OLMESARTAN MEDOXOMIL 40 MG/1
20 TABLET ORAL 2 TIMES DAILY
Qty: 90 TABLET | Refills: 1 | Status: SHIPPED | OUTPATIENT
Start: 2023-05-08

## 2023-05-08 RX ORDER — ROSUVASTATIN CALCIUM 5 MG/1
5 TABLET, COATED ORAL DAILY
Qty: 90 TABLET | Refills: 1 | Status: SHIPPED | OUTPATIENT
Start: 2023-05-08

## 2023-05-08 NOTE — PROGRESS NOTES
Diabetic Foot Exam    Patient's shoes and socks removed  Right Foot/Ankle   Right Foot Inspection  Skin Exam: skin normal and skin intact  No dry skin, no warmth, no callus, no erythema, no maceration, no abnormal color, no pre-ulcer, no ulcer and no callus  Toe Exam: ROM and strength within normal limits  No swelling, no tenderness, erythema and  no right toe deformity    Sensory   Monofilament testing: intact    Vascular  Capillary refills: < 3 seconds  The right DP pulse is 2+  Left Foot/Ankle  Left Foot Inspection  Skin Exam: skin normal and skin intact  No dry skin, no warmth, no erythema, no maceration, normal color, no pre-ulcer, no ulcer and no callus  Toe Exam: ROM and strength within normal limits  No swelling, no tenderness, no erythema and no left toe deformity  Sensory   Monofilament testing: intact    Vascular  Capillary refills: < 3 seconds  The left DP pulse is 2+  The left PT pulse is 2+       Assign Risk Category  No deformity present  No loss of protective sensation  No weak pulses  Risk: 0

## 2023-05-08 NOTE — PROGRESS NOTES
Assessment/Plan:             1  Uncontrolled type 2 diabetes mellitus with hyperglycemia (HCC)  -     sitaGLIPtin (JANUVIA) 100 mg tablet; Take 1 tablet (100 mg total) by mouth daily  -     metFORMIN (GLUCOPHAGE) 1000 MG tablet; Take 1 tablet (1,000 mg total) by mouth 2 (two) times a day with meals    2  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[address] )    3  Mixed hyperlipidemia  -     rosuvastatin (CRESTOR) 5 mg tablet; Take 1 tablet (5 mg total) by mouth daily    4  Primary osteoarthritis of both knees    5  Hypertension, essential  -     olmesartan (BENICAR) 40 mg tablet; Take 0 5 tablets (20 mg total) by mouth 2 (two) times a day    6  Diabetic eye exam [facility])  -     Ambulatory Referral to Ophthalmology; Future           Subjective:      Patient ID: Paulette Kingsley is a 75 y o  female  Follow-up on blood test done on 5/3/2023 test discussed with her      The following portions of the patient's history were reviewed and updated as appropriate: She  has a past medical history of Degenerative joint disease of knee, Depression with anxiety, Diabetes mellitus ([address] ), Diabetes mellitus due to underlying condition with diabetic nephropathy ([address] ), Dysthymic disorder, Esophageal reflux, Essential (primary) hypertension, Fibromyalgia, Gastroesophageal reflux disease, Hypercholesterolemia, Hyperlipidemia, Hypertension, essential, Influenza vaccination declined, Obstructive sleep apnea (adult) (pediatric), Osteoporosis, 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[address] ), Other specified disorders of white blood cells, Pneumococcal vaccination declined, Psoriatic arthropathy ([address] ), Rheumatoid arthritis ([address] ), 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[address] ), Right knee pain, Sleep apnea, Tietze's disease, and Type II diabetes mellitus, uncontrolled    She   Patient Active Problem List    Diagnosis Date Noted   • Tracheitis 12/05/2022   • Depression, recurrent ([address] ) 07/18/2022   • Otalgia 02/03/2022   • Trigeminal neuralgia of left side of face 01/20/2022   • Primary osteoarthritis of both knees 09/16/2021   • Smoking 09/09/2021   • Arthritis 09/09/2021   • Diabetic eye exam ([place] [address] ) 01/14/2021   • Body mass index 31 0-31 9, adult 01/14/2021   • Pneumococcal vaccination declined    • Influenza vaccination declined    • Uncontrolled type 2 diabetes mellitus with hyperglycemia ([place] [address] )    • 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[place] [address] )    • 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[place] [address] )    • Obstructive sleep apnea syndrome    • Hypertension, essential    • Mixed hyperlipidemia    • Gastroesophageal reflux disease    • Dysthymic disorder    • Osteoporosis    • Diabetes mellitus due to underlying condition with diabetic nephropathy, without long-term current use of insulin ([place] [address] )      She  has a past surgical history that includes Hysterectomy; Cholecystectomy; Colonoscopy (01/12/2012); and Mammo (historical) (02/04/2016)  Her family history includes Breast cancer in her cousin; Breast cancer (age of onset: 62) in her sister; Cancer in her sister; Diabetes in her mother; No Known Problems in her maternal aunt, maternal aunt, maternal aunt, maternal aunt, maternal aunt, maternal aunt, maternal grandmother, paternal aunt, and paternal grandmother; Stroke in her father  She  reports that she has been smoking cigarettes  She started smoking about 50 years ago  She has a 25 00 pack-year smoking history  She has quit using smokeless tobacco  She reports that she does not drink alcohol and does not use drugs    Current Outpatient Medications   Medication Sig Dispense Refill   • aspirin (ECOTRIN LOW STRENGTH) 81 mg EC tablet Take 81 mg by mouth daily     • metFORMIN (GLUCOPHAGE) 1000 MG tablet Take 1 tablet (1,000 mg total) by mouth 2 (two) times a day with meals 180 tablet 1   • olmesartan (BENICAR) 40 mg tablet Take 0 5 tablets (20 mg total) by mouth 2 (two) times a day 90 tablet 1   • rosuvastatin (CRESTOR) 5 mg tablet Take 1 tablet (5 mg total) by mouth daily 90 "tablet 1   • sitaGLIPtin (JANUVIA) 100 mg tablet Take 1 tablet (100 mg total) by mouth daily 90 tablet 1     No current facility-administered medications for this visit  Current Outpatient Medications on File Prior to Visit   Medication Sig   • aspirin (ECOTRIN LOW STRENGTH) 81 mg EC tablet Take 81 mg by mouth daily   • [DISCONTINUED] metFORMIN (GLUCOPHAGE) 1000 MG tablet Take 1 tablet (1,000 mg total) by mouth 2 (two) times a day with meals   • [DISCONTINUED] olmesartan (BENICAR) 40 mg tablet Take 0 5 tablets (20 mg total) by mouth 2 (two) times a day   • [DISCONTINUED] rosuvastatin (CRESTOR) 5 mg tablet Take 1 tablet (5 mg total) by mouth daily   • [DISCONTINUED] sitaGLIPtin (JANUVIA) 100 mg tablet Take 1 tablet (100 mg total) by mouth daily     No current facility-administered medications on file prior to visit  She is allergic to nuts - food allergy, atorvastatin, ezetimibe, latex, lisinopril, methotrexate, other, penicillins, pravastatin, and shellfish allergy - food allergy       Review of Systems   Constitutional: Negative for chills and fever  HENT: Negative for congestion, ear pain and sore throat  Eyes: Negative for pain  Respiratory: Negative for cough and shortness of breath  Cardiovascular: Negative for chest pain and leg swelling  Gastrointestinal: Negative for abdominal pain, nausea and vomiting  Endocrine: Negative for polyuria  Genitourinary: Negative for difficulty urinating, frequency and urgency  Musculoskeletal: Negative for arthralgias and back pain  Skin: Negative for rash  Neurological: Negative for weakness and headaches  Psychiatric/Behavioral: Negative for sleep disturbance  The patient is not nervous/anxious            Objective:      /60 (BP Location: Left arm, Patient Position: Sitting, Cuff Size: Standard)   Pulse 79   Temp 98 9 °F (37 2 °C) (Temporal)   Ht 5' 1\" (1 549 m)   Wt 68 9 kg (152 lb)   SpO2 97%   BMI 28 72 kg/m²     Recent Results " (from the past 1344 hour(s))   HEMOGLOBIN A1C    Collection Time: 05/03/23 10:43 AM   Result Value Ref Range    Hemoglobin A1C 7 3 (H) <5 7 %    eAG, EST AVG Glucose 163 mg/dL mg/dL        Physical Exam  Constitutional:       Appearance: Normal appearance  HENT:      Head: Normocephalic  Right Ear: External ear normal       Left Ear: External ear normal       Nose: Nose normal  No congestion  Mouth/Throat:      Mouth: Mucous membranes are moist       Pharynx: Oropharynx is clear  No oropharyngeal exudate or posterior oropharyngeal erythema  Eyes:      Extraocular Movements: Extraocular movements intact  Conjunctiva/sclera: Conjunctivae normal    Cardiovascular:      Rate and Rhythm: Normal rate and regular rhythm  Heart sounds: Normal heart sounds  No murmur heard  Pulmonary:      Effort: Pulmonary effort is normal       Breath sounds: Normal breath sounds  No wheezing or rales  Abdominal:      General: Bowel sounds are normal  There is no distension  Palpations: Abdomen is soft  Tenderness: There is no abdominal tenderness  Musculoskeletal:         General: Normal range of motion  Cervical back: Normal range of motion and neck supple  Right lower leg: No edema  Left lower leg: No edema  Lymphadenopathy:      Cervical: No cervical adenopathy  Skin:     General: Skin is warm  Neurological:      General: No focal deficit present  Mental Status: She is alert and oriented to person, place, and time

## 2023-10-19 ENCOUNTER — OFFICE VISIT (OUTPATIENT)
Dept: INTERNAL MEDICINE CLINIC | Facility: CLINIC | Age: 68
End: 2023-10-19

## 2023-10-19 VITALS
HEART RATE: 78 BPM | OXYGEN SATURATION: 98 % | DIASTOLIC BLOOD PRESSURE: 66 MMHG | HEIGHT: 61 IN | BODY MASS INDEX: 28.89 KG/M2 | TEMPERATURE: 97.8 F | SYSTOLIC BLOOD PRESSURE: 108 MMHG | WEIGHT: 153 LBS

## 2023-10-19 DIAGNOSIS — E78.2 MIXED HYPERLIPIDEMIA: ICD-10-CM

## 2023-10-19 DIAGNOSIS — M05.40 RHEUMATOID MYOPATHY WITH RHEUMATOID ARTHRITIS (HCC): ICD-10-CM

## 2023-10-19 DIAGNOSIS — Z01.00 DIABETIC EYE EXAM (HCC): ICD-10-CM

## 2023-10-19 DIAGNOSIS — M17.0 PRIMARY OSTEOARTHRITIS OF BOTH KNEES: ICD-10-CM

## 2023-10-19 DIAGNOSIS — G47.33 OBSTRUCTIVE SLEEP APNEA SYNDROME: ICD-10-CM

## 2023-10-19 DIAGNOSIS — E11.65 UNCONTROLLED TYPE 2 DIABETES MELLITUS WITH HYPERGLYCEMIA (HCC): Primary | ICD-10-CM

## 2023-10-19 DIAGNOSIS — E11.9 DIABETIC EYE EXAM (HCC): ICD-10-CM

## 2023-10-19 DIAGNOSIS — I10 HYPERTENSION, ESSENTIAL: ICD-10-CM

## 2023-10-19 RX ORDER — OLMESARTAN MEDOXOMIL 40 MG/1
20 TABLET ORAL 2 TIMES DAILY
Qty: 90 TABLET | Refills: 1 | Status: SHIPPED | OUTPATIENT
Start: 2023-10-19

## 2023-10-19 RX ORDER — ROSUVASTATIN CALCIUM 5 MG/1
5 TABLET, COATED ORAL DAILY
Qty: 90 TABLET | Refills: 1 | Status: SHIPPED | OUTPATIENT
Start: 2023-10-19

## 2023-10-19 NOTE — PROGRESS NOTES
Assessment/Plan:             1. Uncontrolled type 2 diabetes mellitus with hyperglycemia ([address])    2. Obstructive sleep apnea syndrome    3. Hypertension, essential    4. Diabetic eye exam ([address])    5. Primary osteoarthritis of both knees    6. 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[address])    7. Mixed hyperlipidemia           Subjective:      Patient ID: Tiny Gastelum is a 75 y.o. female. Follow-up on multiple medical problems to ensure they are stable on current medications        The following portions of the patient's history were reviewed and updated as appropriate: She  has a past medical history of Degenerative joint disease of knee, Depression with anxiety, Diabetes mellitus ([address]), Diabetes mellitus due to underlying condition with diabetic nephropathy ([address]), Dysthymic disorder, Esophageal reflux, Essential (primary) hypertension, Fibromyalgia, Gastroesophageal reflux disease, Hypercholesterolemia, Hyperlipidemia, Hypertension, essential, Influenza vaccination declined, Obstructive sleep apnea (adult) (pediatric), Osteoporosis, 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[address]), Other specified disorders of white blood cells, Pneumococcal vaccination declined, Psoriatic arthropathy ([address]), Rheumatoid arthritis ([address]), 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[address]), Right knee pain, Sleep apnea, Tietze's disease, and Type II diabetes mellitus, uncontrolled.   She   Patient Active Problem List    Diagnosis Date Noted    Tracheitis 12/05/2022    Depression, recurrent ([address]) 07/18/2022    Otalgia 02/03/2022    Trigeminal neuralgia of left side of face 01/20/2022    Primary osteoarthritis of both knees 09/16/2021    Smoking 09/09/2021    Arthritis 09/09/2021    Diabetic eye exam ([address]) 01/14/2021    Body mass index 31.0-31.9, adult 01/14/2021    Pneumococcal vaccination declined     Influenza vaccination declined     Uncontrolled type 2 diabetes mellitus with hyperglycemia ([address])     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[address])     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[address])     Obstructive sleep apnea syndrome     Hypertension, essential     Mixed hyperlipidemia     Gastroesophageal reflux disease     Dysthymic disorder     Osteoporosis     Diabetes mellitus due to underlying condition with diabetic nephropathy, without long-term current use of insulin ([address])      She  has a past surgical history that includes Hysterectomy; Cholecystectomy; Colonoscopy (01/12/2012); and Mammo (historical) (02/04/2016). Her family history includes Breast cancer in her cousin; Breast cancer (age of onset: 62) in her sister; Cancer in her sister; Diabetes in her mother; No Known Problems in her maternal aunt, maternal aunt, maternal aunt, maternal aunt, maternal aunt, maternal aunt, maternal grandmother, paternal aunt, and paternal grandmother; Stroke in her father. She  reports that she has been smoking cigarettes. She started smoking about 50 years ago. She has a 25.00 pack-year smoking history. She has quit using smokeless tobacco. She reports that she does not drink alcohol and does not use drugs. Current Outpatient Medications   Medication Sig Dispense Refill    aspirin (ECOTRIN LOW STRENGTH) 81 mg EC tablet Take 81 mg by mouth daily      metFORMIN (GLUCOPHAGE) 1000 MG tablet Take 1 tablet (1,000 mg total) by mouth 2 (two) times a day with meals 180 tablet 1    olmesartan (BENICAR) 40 mg tablet Take 0.5 tablets (20 mg total) by mouth 2 (two) times a day 90 tablet 1    rosuvastatin (CRESTOR) 5 mg tablet Take 1 tablet (5 mg total) by mouth daily 90 tablet 1    sitaGLIPtin (JANUVIA) 100 mg tablet Take 1 tablet (100 mg total) by mouth daily 90 tablet 1     No current facility-administered medications for this visit.      Current Outpatient Medications on File Prior to Visit   Medication Sig    aspirin (ECOTRIN LOW STRENGTH) 81 mg EC tablet Take 81 mg by mouth daily    metFORMIN (GLUCOPHAGE) 1000 MG tablet Take 1 tablet (1,000 mg total) by mouth 2 (two) times a day with meals    olmesartan (BENICAR) 40 mg tablet Take 0.5 tablets (20 mg total) by mouth 2 (two) times a day    rosuvastatin (CRESTOR) 5 mg tablet Take 1 tablet (5 mg total) by mouth daily    sitaGLIPtin (JANUVIA) 100 mg tablet Take 1 tablet (100 mg total) by mouth daily     No current facility-administered medications on file prior to visit. She is allergic to nuts - food allergy, atorvastatin, ezetimibe, latex, lisinopril, methotrexate, other, penicillins, pravastatin, and shellfish allergy - food allergy. .    Review of Systems   Constitutional:  Negative for chills and fever. HENT:  Negative for congestion, ear pain and sore throat. Eyes:  Negative for pain. Respiratory:  Negative for cough and shortness of breath. Cardiovascular:  Negative for chest pain and leg swelling. Gastrointestinal:  Negative for abdominal pain, nausea and vomiting. Endocrine: Negative for polyuria. Genitourinary:  Negative for difficulty urinating, frequency and urgency. Musculoskeletal:  Positive for arthralgias and back pain. Skin:  Negative for rash. Neurological:  Negative for weakness and headaches. Psychiatric/Behavioral:  Negative for sleep disturbance. The patient is not nervous/anxious. Objective:      /66 (BP Location: Left arm, Patient Position: Sitting, Cuff Size: Standard)   Pulse 78   Temp 97.8 °F (36.6 °C) (Temporal)   Ht 5' 1" (1.549 m)   Wt 69.4 kg (153 lb)   SpO2 98%   BMI 28.91 kg/m²     No results found for this or any previous visit (from the past 1344 hour(s)). Physical Exam  Constitutional:       Appearance: Normal appearance. HENT:      Head: Normocephalic. Right Ear: Tympanic membrane, ear canal and external ear normal.      Left Ear: Tympanic membrane, ear canal and external ear normal.      Nose: Nose normal. No congestion. Mouth/Throat:      Mouth: Mucous membranes are moist.      Pharynx: Oropharynx is clear.  No oropharyngeal exudate or posterior oropharyngeal erythema. Eyes:      Extraocular Movements: Extraocular movements intact. Conjunctiva/sclera: Conjunctivae normal.   Cardiovascular:      Rate and Rhythm: Normal rate and regular rhythm. Heart sounds: Normal heart sounds. No murmur heard. Pulmonary:      Effort: Pulmonary effort is normal.      Breath sounds: Normal breath sounds. No wheezing or rales. Abdominal:      General: Abdomen is flat. There is no distension. Palpations: Abdomen is soft. Tenderness: There is no abdominal tenderness. Musculoskeletal:      Cervical back: Normal range of motion and neck supple. Right lower leg: No edema. Left lower leg: No edema. Lymphadenopathy:      Cervical: No cervical adenopathy. Skin:     General: Skin is warm. Neurological:      General: No focal deficit present. Mental Status: She is alert and oriented to person, place, and time.

## 2023-12-10 ENCOUNTER — VBI (OUTPATIENT)
Dept: ADMINISTRATIVE | Facility: OTHER | Age: 68
End: 2023-12-10

## 2024-01-31 ENCOUNTER — APPOINTMENT (OUTPATIENT)
Dept: LAB | Facility: CLINIC | Age: 69
End: 2024-01-31
Payer: COMMERCIAL

## 2024-01-31 DIAGNOSIS — E11.65 UNCONTROLLED TYPE 2 DIABETES MELLITUS WITH HYPERGLYCEMIA (HCC): ICD-10-CM

## 2024-01-31 DIAGNOSIS — R35.0 URINARY FREQUENCY: ICD-10-CM

## 2024-01-31 LAB
ALBUMIN SERPL BCP-MCNC: 4.4 G/DL (ref 3.5–5)
ALP SERPL-CCNC: 57 U/L (ref 34–104)
ALT SERPL W P-5'-P-CCNC: 15 U/L (ref 7–52)
ANION GAP SERPL CALCULATED.3IONS-SCNC: 10 MMOL/L
AST SERPL W P-5'-P-CCNC: 13 U/L (ref 13–39)
BASOPHILS # BLD AUTO: 0.05 THOUSANDS/ÂΜL (ref 0–0.1)
BASOPHILS NFR BLD AUTO: 1 % (ref 0–1)
BILIRUB SERPL-MCNC: 0.49 MG/DL (ref 0.2–1)
BUN SERPL-MCNC: 18 MG/DL (ref 5–25)
CALCIUM SERPL-MCNC: 9.9 MG/DL (ref 8.4–10.2)
CHLORIDE SERPL-SCNC: 105 MMOL/L (ref 96–108)
CHOLEST SERPL-MCNC: 132 MG/DL
CO2 SERPL-SCNC: 24 MMOL/L (ref 21–32)
CREAT SERPL-MCNC: 0.9 MG/DL (ref 0.6–1.3)
CREAT UR-MCNC: 168.6 MG/DL
EOSINOPHIL # BLD AUTO: 0.18 THOUSAND/ÂΜL (ref 0–0.61)
EOSINOPHIL NFR BLD AUTO: 3 % (ref 0–6)
ERYTHROCYTE [DISTWIDTH] IN BLOOD BY AUTOMATED COUNT: 12.7 % (ref 11.6–15.1)
EST. AVERAGE GLUCOSE BLD GHB EST-MCNC: 163 MG/DL
GFR SERPL CREATININE-BSD FRML MDRD: 65 ML/MIN/1.73SQ M
GLUCOSE P FAST SERPL-MCNC: 153 MG/DL (ref 65–99)
HBA1C MFR BLD: 7.3 %
HCT VFR BLD AUTO: 46.6 % (ref 34.8–46.1)
HDLC SERPL-MCNC: 30 MG/DL
HGB BLD-MCNC: 15.5 G/DL (ref 11.5–15.4)
IMM GRANULOCYTES # BLD AUTO: 0.01 THOUSAND/UL (ref 0–0.2)
IMM GRANULOCYTES NFR BLD AUTO: 0 % (ref 0–2)
LDLC SERPL CALC-MCNC: 55 MG/DL (ref 0–100)
LYMPHOCYTES # BLD AUTO: 2.66 THOUSANDS/ÂΜL (ref 0.6–4.47)
LYMPHOCYTES NFR BLD AUTO: 48 % (ref 14–44)
MCH RBC QN AUTO: 30.6 PG (ref 26.8–34.3)
MCHC RBC AUTO-ENTMCNC: 33.3 G/DL (ref 31.4–37.4)
MCV RBC AUTO: 92 FL (ref 82–98)
MICROALBUMIN UR-MCNC: 20.6 MG/L
MICROALBUMIN/CREAT 24H UR: 12 MG/G CREATININE (ref 0–30)
MONOCYTES # BLD AUTO: 0.47 THOUSAND/ÂΜL (ref 0.17–1.22)
MONOCYTES NFR BLD AUTO: 9 % (ref 4–12)
NEUTROPHILS # BLD AUTO: 2.13 THOUSANDS/ÂΜL (ref 1.85–7.62)
NEUTS SEG NFR BLD AUTO: 39 % (ref 43–75)
NRBC BLD AUTO-RTO: 0 /100 WBCS
PLATELET # BLD AUTO: 193 THOUSANDS/UL (ref 149–390)
PMV BLD AUTO: 10.6 FL (ref 8.9–12.7)
POTASSIUM SERPL-SCNC: 4.2 MMOL/L (ref 3.5–5.3)
PROT SERPL-MCNC: 7.6 G/DL (ref 6.4–8.4)
RBC # BLD AUTO: 5.06 MILLION/UL (ref 3.81–5.12)
SODIUM SERPL-SCNC: 139 MMOL/L (ref 135–147)
TRIGL SERPL-MCNC: 236 MG/DL
TSH SERPL DL<=0.05 MIU/L-ACNC: 3.08 UIU/ML (ref 0.45–4.5)
WBC # BLD AUTO: 5.5 THOUSAND/UL (ref 4.31–10.16)

## 2024-01-31 PROCEDURE — 80061 LIPID PANEL: CPT

## 2024-01-31 PROCEDURE — 87086 URINE CULTURE/COLONY COUNT: CPT

## 2024-01-31 PROCEDURE — 84443 ASSAY THYROID STIM HORMONE: CPT

## 2024-01-31 PROCEDURE — 85025 COMPLETE CBC W/AUTO DIFF WBC: CPT

## 2024-01-31 PROCEDURE — 83036 HEMOGLOBIN GLYCOSYLATED A1C: CPT

## 2024-01-31 PROCEDURE — 80053 COMPREHEN METABOLIC PANEL: CPT

## 2024-01-31 PROCEDURE — 36415 COLL VENOUS BLD VENIPUNCTURE: CPT

## 2024-02-01 ENCOUNTER — RA CDI HCC (OUTPATIENT)
Dept: OTHER | Facility: HOSPITAL | Age: 69
End: 2024-02-01

## 2024-02-02 LAB — BACTERIA UR CULT: NORMAL

## 2024-02-08 ENCOUNTER — OFFICE VISIT (OUTPATIENT)
Dept: INTERNAL MEDICINE CLINIC | Facility: CLINIC | Age: 69
End: 2024-02-08
Payer: COMMERCIAL

## 2024-02-08 VITALS
TEMPERATURE: 98.4 F | HEIGHT: 61 IN | WEIGHT: 155 LBS | HEART RATE: 72 BPM | SYSTOLIC BLOOD PRESSURE: 122 MMHG | BODY MASS INDEX: 29.27 KG/M2 | OXYGEN SATURATION: 96 % | DIASTOLIC BLOOD PRESSURE: 80 MMHG

## 2024-02-08 DIAGNOSIS — F33.9 DEPRESSION, RECURRENT (HCC): ICD-10-CM

## 2024-02-08 DIAGNOSIS — E11.65 UNCONTROLLED TYPE 2 DIABETES MELLITUS WITH HYPERGLYCEMIA (HCC): Primary | ICD-10-CM

## 2024-02-08 DIAGNOSIS — I10 HYPERTENSION, ESSENTIAL: ICD-10-CM

## 2024-02-08 DIAGNOSIS — Z00.00 MEDICARE ANNUAL WELLNESS VISIT, SUBSEQUENT: ICD-10-CM

## 2024-02-08 DIAGNOSIS — E08.21 DIABETES MELLITUS DUE TO UNDERLYING CONDITION WITH DIABETIC NEPHROPATHY, WITHOUT LONG-TERM CURRENT USE OF INSULIN (HCC): ICD-10-CM

## 2024-02-08 DIAGNOSIS — Z12.31 SCREENING MAMMOGRAM FOR HIGH-RISK PATIENT: ICD-10-CM

## 2024-02-08 DIAGNOSIS — Z12.31 ENCOUNTER FOR SCREENING MAMMOGRAM FOR BREAST CANCER: ICD-10-CM

## 2024-02-08 DIAGNOSIS — M05.40 RHEUMATOID MYOPATHY WITH RHEUMATOID ARTHRITIS (HCC): ICD-10-CM

## 2024-02-08 DIAGNOSIS — E78.2 MIXED HYPERLIPIDEMIA: ICD-10-CM

## 2024-02-08 PROCEDURE — 99214 OFFICE O/P EST MOD 30 MIN: CPT | Performed by: INTERNAL MEDICINE

## 2024-02-08 PROCEDURE — G0439 PPPS, SUBSEQ VISIT: HCPCS | Performed by: INTERNAL MEDICINE

## 2024-02-08 RX ORDER — OLMESARTAN MEDOXOMIL 40 MG/1
20 TABLET ORAL 2 TIMES DAILY
Qty: 90 TABLET | Refills: 2 | Status: SHIPPED | OUTPATIENT
Start: 2024-02-08

## 2024-02-08 RX ORDER — ROSUVASTATIN CALCIUM 5 MG/1
5 TABLET, COATED ORAL DAILY
Qty: 90 TABLET | Refills: 3 | Status: SHIPPED | OUTPATIENT
Start: 2024-02-08

## 2024-02-08 NOTE — PROGRESS NOTES
Assessment and Plan:     Problem List Items Addressed This Visit          Endocrine    Uncontrolled type 2 diabetes mellitus with hyperglycemia (HCC) - Primary    Relevant Medications    sitaGLIPtin (JANUVIA) 100 mg tablet    Other Relevant Orders    CBC and differential    Comprehensive metabolic panel    Lipid Panel with Direct LDL reflex    TSH, 3rd generation    Albumin / creatinine urine ratio    Hemoglobin A1C    Diabetes mellitus due to underlying condition with diabetic nephropathy, without long-term current use of insulin (HCC)    Relevant Medications    sitaGLIPtin (JANUVIA) 100 mg tablet       Cardiovascular and Mediastinum    Hypertension, essential    Relevant Medications    olmesartan (BENICAR) 40 mg tablet       Musculoskeletal and Integument    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HCC)       Other    Mixed hyperlipidemia    Relevant Medications    rosuvastatin (CRESTOR) 5 mg tablet    Medicare annual wellness visit, subsequent    Depression, recurrent (HCC)     Other Visit Diagnoses       Screening mammogram for high-risk patient        Relevant Orders    Mammo screening bilateral w 3d & cad    Encounter for screening mammogram for breast cancer                Tobacco Cessation Counseling: Tobacco cessation counseling was provided. The patient is sincerely urged to quit consumption of tobacco. She is ready to quit tobacco.       Preventive health issues were discussed with patient, and age appropriate screening tests were ordered as noted in patient's After Visit Summary.  Personalized health advice and appropriate referrals for health education or preventive services given if needed, as noted in patient's After Visit Summary.     History of Present Illness:     Patient presents for a Medicare Wellness Visit    Follow-up on blood test done 1/31/2024 test discussed with her, also medical wellness exam       Patient Care Team:  Yves Ramirez MD as PCP - General (Internal Medicine)  Yves  MD Ashley as PCP - PCP-[facility] ([facility])     Review of Systems:     Review of Systems   Constitutional:  Negative for chills and fever.   HENT:  Negative for congestion, ear pain and sore throat.    Eyes:  Negative for pain.   Respiratory:  Negative for cough and shortness of breath.    Cardiovascular:  Negative for chest pain and leg swelling.   Gastrointestinal:  Negative for abdominal pain, nausea and vomiting.   Endocrine: Negative for polyuria.   Genitourinary:  Negative for difficulty urinating, frequency and urgency.   Musculoskeletal:  Positive for arthralgias. Negative for back pain.   Skin:  Negative for rash.   Neurological:  Negative for weakness and headaches.   Psychiatric/Behavioral:  Negative for sleep disturbance. The patient is not nervous/anxious.         Problem List:     Patient Active Problem List   Diagnosis    Uncontrolled type 2 diabetes mellitus with hyperglycemia (HCC)    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HCC)    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HCC)    Obstructive sleep apnea syndrome    Hypertension, essential    Mixed hyperlipidemia    Gastroesophageal reflux disease    Dysthymic disorder    Osteoporosis    Diabetes mellitus due to underlying condition with diabetic nephropathy, without long-term current use of insulin (HCC)    Pneumococcal vaccination declined    Influenza vaccination declined    Medicare annual wellness visit, subsequent    Body mass index 31.0-31.9, adult    Smoking    Arthritis    Primary osteoarthritis of both knees    Trigeminal neuralgia of left side of face    Otalgia    Depression, recurrent (HCC)    Tracheitis      Past Medical and Surgical History:     Past Medical History:   Diagnosis Date    Degenerative joint disease of knee     right more than left     Depression with anxiety     Diabetes mellitus (HCC)     Diabetes mellitus due to underlying condition with diabetic nephropathy (HCC)     Dysthymic disorder     Esophageal reflux      Essential (primary) hypertension     Fibromyalgia     Gastroesophageal reflux disease     Hypercholesterolemia     Hyperlipidemia     Hypertension, essential     Influenza vaccination declined     Obstructive sleep apnea (adult) (pediatric)     Osteoporosis     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HCC)     Other specified disorders of white blood cells     Pneumococcal vaccination declined     Psoriatic arthropathy (HCC)     Rheumatoid arthritis (HCC)     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HCC)     Right knee pain     Sleep apnea     Tietze's disease     Type II diabetes mellitus, uncontrolled      Past Surgical History:   Procedure Laterality Date    CHOLECYSTECTOMY      COLONOSCOPY  01/12/2012    Abnormal     HYSTERECTOMY      MAMMO (HISTORICAL)  02/04/2016    Normal       Family History:     Family History   Problem Relation Age of Onset    Diabetes Mother     Stroke Father     Breast cancer Sister 58    Cancer Sister         Intestine    No Known Problems Maternal Grandmother     No Known Problems Paternal Grandmother     No Known Problems Maternal Aunt     No Known Problems Maternal Aunt     No Known Problems Maternal Aunt     No Known Problems Maternal Aunt     No Known Problems Maternal Aunt     No Known Problems Maternal Aunt     Breast cancer Cousin         in her 40's    No Known Problems Paternal Aunt       Social History:     Social History     Socioeconomic History    Marital status: /Civil Union     Spouse name: None    Number of children: None    Years of education: None    Highest education level: None   Occupational History    None   Tobacco Use    Smoking status: Every Day     Current packs/day: 0.50     Average packs/day: 0.5 packs/day for 51.1 years (25.6 ttl pk-yrs)     Types: Cigarettes     Start date: 1973     Passive exposure: Never    Smokeless tobacco: Former    Tobacco comments:     Curernt every day smoker - As per [employer]    Vaping Use    Vaping status: Never Used    Substance and Sexual Activity    Alcohol use: Never    Drug use: Never    Sexual activity: None   Other Topics Concern    None   Social History Narrative    Travel outside [place]: No      Social Determinants of Health     Financial Resource Strain: Low Risk  (2/8/2024)    Overall Financial Resource Strain (CARDIA)     Difficulty of Paying Living Expenses: Not hard at all   Food Insecurity: Not on file   Transportation Needs: No Transportation Needs (2/8/2024)    PRAPARE - Transportation     Lack of Transportation (Medical): No     Lack of Transportation (Non-Medical): No   Physical Activity: Not on file   Stress: Not on file   Social Connections: Not on file   Intimate Partner Violence: Not on file   Housing Stability: Not on file      Medications and Allergies:     Current Outpatient Medications   Medication Sig Dispense Refill    aspirin (ECOTRIN LOW STRENGTH) 81 mg EC tablet Take 81 mg by mouth daily      olmesartan (BENICAR) 40 mg tablet Take 0.5 tablets (20 mg total) by mouth 2 (two) times a day 90 tablet 2    rosuvastatin (CRESTOR) 5 mg tablet Take 1 tablet (5 mg total) by mouth daily 90 tablet 3    sitaGLIPtin (JANUVIA) 100 mg tablet Take 1 tablet (100 mg total) by mouth daily 30 tablet 4    metFORMIN (GLUCOPHAGE) 1000 MG tablet Take 1 tablet (1,000 mg total) by mouth 2 (two) times a day with meals (Patient not taking: Reported on 2/8/2024) 180 tablet 1     No current facility-administered medications for this visit.     Allergies   Allergen Reactions    Nuts - Food Allergy Anaphylaxis    Atorvastatin      Skin itchy    Ezetimibe     Latex     Lisinopril     Methotrexate     Other Abdominal Pain    Penicillins     Pravastatin     Shellfish Allergy - Food Allergy       Immunizations:     Immunization History   Administered Date(s) Administered    COVID-19 PFIZER VACCINE 0.3 ML IM 04/08/2021, 04/28/2021, 10/28/2021      Health Maintenance:         Topic Date Due    Breast Cancer Screening: Mammogram  01/20/2024     Colorectal Cancer Screening  09/09/2026    Hepatitis C Screening  Completed         Topic Date Due    Pneumococcal Vaccine: 65+ Years (1 - PCV) Never done    Influenza Vaccine (1) Never done    COVID-19 Vaccine (4 - 2023-24 season) 09/01/2023      Medicare Screening Tests and Risk Assessments:     Rosmery is here for her Subsequent Wellness visit. Last Medicare Wellness visit information reviewed, patient interviewed and updates made to the record today.      Health Risk Assessment:   Patient rates overall health as fair. Patient feels that their physical health rating is slightly better. Patient is very satisfied with their life. Eyesight was rated as same. Hearing was rated as same. Patient feels that their emotional and mental health rating is much better. Patients states they are sometimes angry. Patient states they are sometimes unusually tired/fatigued. Pain experienced in the last 7 days has been a lot. Patient's pain rating has been 6/10. Patient states that she has experienced weight loss or gain in last 6 months.     Fall Risk Screening:   In the past year, patient has experienced: no history of falling in past year      Urinary Incontinence Screening:   Patient has not leaked urine accidently in the last six months.     Home Safety:  Patient has trouble with stairs inside or outside of their home. Patient has working smoke alarms and has working carbon monoxide detector. Home safety hazards include: none.     Nutrition:   Current diet is Low Cholesterol and Low Saturated Fat.     Medications:   Patient is not currently taking any over-the-counter supplements. Patient is able to manage medications.     Activities of Daily Living (ADLs)/Instrumental Activities of Daily Living (IADLs):   Walk and transfer into and out of bed and chair?: Yes  Dress and groom yourself?: Yes    Bathe or shower yourself?: Yes    Feed yourself? Yes  Do your laundry/housekeeping?: Yes  Manage your money, pay your bills and track  "your expenses?: Yes  Make your own meals?: Yes    Do your own shopping?: Yes    Previous Hospitalizations:   Any hospitalizations or ED visits within the last 12 months?: Yes    How many hospitalizations have you had in the last year?: 1-2    Advance Care Planning:   Living will: No    Durable POA for healthcare: Yes    Advanced directive: No    ACP document given: Yes      Cognitive Screening:   Provider or family/friend/caregiver concerned regarding cognition?: No    PREVENTIVE SCREENINGS      Cardiovascular Screening:    General: Screening Not Indicated and History Lipid Disorder      Diabetes Screening:     General: Screening Not Indicated and History Diabetes      Colorectal Cancer Screening:     General: Screening Current      Breast Cancer Screening:     General: Screening Current      Cervical Cancer Screening:    General: Screening Not Indicated      Osteoporosis Screening:    General: Screening Not Indicated and History Osteoporosis      Hepatitis C Screening:    General: Screening Current    Screening, Brief Intervention, and Referral to Treatment (SBIRT)    Screening  Typical number of drinks in a day: 0  Typical number of drinks in a week: 0  Interpretation: Low risk drinking behavior.    Single Item Drug Screening:  How often have you used an illegal drug (including marijuana) or a prescription medication for non-medical reasons in the past year? never    Single Item Drug Screen Score: 0  Interpretation: Negative screen for possible drug use disorder    No results found.     Physical Exam:     /80 (BP Location: Left arm, Patient Position: Sitting, Cuff Size: Large)   Pulse 72   Temp 98.4 °F (36.9 °C) (Temporal)   Ht 5' 1\" (1.549 m)   Wt 70.3 kg (155 lb)   SpO2 96%   BMI 29.29 kg/m²     Physical Exam  Vitals and nursing note reviewed.   Constitutional:       General: She is not in acute distress.     Appearance: She is well-developed.   HENT:      Head: Normocephalic and atraumatic.      " Right Ear: Tympanic membrane, ear canal and external ear normal.      Left Ear: Tympanic membrane, ear canal and external ear normal.      Mouth/Throat:      Pharynx: Oropharynx is clear.   Eyes:      Extraocular Movements: Extraocular movements intact.      Conjunctiva/sclera: Conjunctivae normal.   Cardiovascular:      Rate and Rhythm: Normal rate and regular rhythm.      Heart sounds: Normal heart sounds. No murmur heard.  Pulmonary:      Effort: Pulmonary effort is normal. No respiratory distress.      Breath sounds: Normal breath sounds.   Abdominal:      General: Abdomen is flat. There is no distension.      Palpations: Abdomen is soft.      Tenderness: There is no abdominal tenderness.   Musculoskeletal:         General: No swelling.      Cervical back: Neck supple.      Right lower leg: No edema.      Left lower leg: No edema.   Skin:     General: Skin is warm and dry.      Capillary Refill: Capillary refill takes less than 2 seconds.   Neurological:      General: No focal deficit present.      Mental Status: She is alert and oriented to person, place, and time.   Psychiatric:         Mood and Affect: Mood normal.          Yves Ramirez MD

## 2024-02-21 PROBLEM — Z00.00 MEDICARE ANNUAL WELLNESS VISIT, SUBSEQUENT: Status: RESOLVED | Noted: 2021-01-14 | Resolved: 2024-02-21

## 2024-02-22 LAB
LEFT EYE DIABETIC RETINOPATHY: NORMAL
RIGHT EYE DIABETIC RETINOPATHY: NORMAL

## 2024-04-18 ENCOUNTER — TELEPHONE (OUTPATIENT)
Dept: INTERNAL MEDICINE CLINIC | Facility: CLINIC | Age: 69
End: 2024-04-18

## 2024-04-19 ENCOUNTER — OFFICE VISIT (OUTPATIENT)
Dept: INTERNAL MEDICINE CLINIC | Facility: CLINIC | Age: 69
End: 2024-04-19
Payer: COMMERCIAL

## 2024-04-19 ENCOUNTER — HOSPITAL ENCOUNTER (OUTPATIENT)
Dept: RADIOLOGY | Facility: HOSPITAL | Age: 69
Discharge: HOME | End: 2024-04-19
Payer: COMMERCIAL

## 2024-04-19 VITALS
HEIGHT: 61 IN | TEMPERATURE: 98.3 F | DIASTOLIC BLOOD PRESSURE: 82 MMHG | OXYGEN SATURATION: 97 % | SYSTOLIC BLOOD PRESSURE: 128 MMHG | BODY MASS INDEX: 29.83 KG/M2 | WEIGHT: 158 LBS | HEART RATE: 77 BPM

## 2024-04-19 DIAGNOSIS — M17.0 PRIMARY OSTEOARTHRITIS OF BOTH KNEES: ICD-10-CM

## 2024-04-19 DIAGNOSIS — M50.90 CERVICAL DISC DISEASE: ICD-10-CM

## 2024-04-19 DIAGNOSIS — M54.50 LOW BACK PAIN AT MULTIPLE SITES: ICD-10-CM

## 2024-04-19 DIAGNOSIS — I10 HYPERTENSION, ESSENTIAL: ICD-10-CM

## 2024-04-19 DIAGNOSIS — M05.40 RHEUMATOID MYOPATHY WITH RHEUMATOID ARTHRITIS (HCC): ICD-10-CM

## 2024-04-19 DIAGNOSIS — E11.65 UNCONTROLLED TYPE 2 DIABETES MELLITUS WITH HYPERGLYCEMIA (HCC): Primary | ICD-10-CM

## 2024-04-19 DIAGNOSIS — E78.2 MIXED HYPERLIPIDEMIA: ICD-10-CM

## 2024-04-19 PROCEDURE — 72110 X-RAY EXAM L-2 SPINE 4/>VWS: CPT

## 2024-04-19 PROCEDURE — 99214 OFFICE O/P EST MOD 30 MIN: CPT | Performed by: INTERNAL MEDICINE

## 2024-04-19 PROCEDURE — 72050 X-RAY EXAM NECK SPINE 4/5VWS: CPT

## 2024-04-19 PROCEDURE — G2211 COMPLEX E/M VISIT ADD ON: HCPCS | Performed by: INTERNAL MEDICINE

## 2024-04-19 NOTE — PROGRESS NOTES
Assessment/Plan:      Depression Screening and Follow-up Plan: Patient was screened for depression during today's encounter. They screened negative with a PHQ-9 score of 2.            1. Uncontrolled type 2 diabetes mellitus with hyperglycemia (HCC)  Comments:  Advised her to take 1 metformin a day, continue Januvia, keep  sugar log, needs to call me if the sugar is high    2. Cervical disc disease  -     XR spine cervical complete 4 or 5 vw non injury; Future; Expected date: 04/19/2024    3. Low back pain at multiple sites  -     XR spine lumbar minimum 4 views non injury; Future; Expected date: 04/19/2024    4. Hypertension, essential  Comments:  Stable, continue same medication    5. Primary osteoarthritis of both knees    6. 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HCC)    7. Mixed hyperlipidemia  Comments:  continue same medication           Subjective:      Patient ID: Rosmery Angulo is a 68 y.o. female.    Low back pain, also neck pain, no radiation, also sugars are high, metformin was bothering her stomach, she stopped, better with the stomach,    Back Pain  Pertinent negatives include no abdominal pain, chest pain, fever, headaches or weakness.       The following portions of the patient's history were reviewed and updated as appropriate: She  has a past medical history of Degenerative joint disease of knee, Depression with anxiety, Diabetes mellitus (HCC), Diabetes mellitus due to underlying condition with diabetic nephropathy (HCC), Dysthymic disorder, Esophageal reflux, Essential (primary) hypertension, Fibromyalgia, Gastroesophageal reflux disease, Hypercholesterolemia, Hyperlipidemia, Hypertension, essential, Influenza vaccination declined, Obstructive sleep apnea (adult) (pediatric), Osteoporosis, 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HCC), Other specified disorders of white blood cells, Pneumococcal vaccination declined, Psoriatic arthropathy (HCC), Rheumatoid arthritis (HCC), Rheumatoid myopathy with  rheumatoid arthritis (HCC), Right knee pain, Sleep apnea, Tietze's disease, and Type II diabetes mellitus, uncontrolled.  She   Patient Active Problem List    Diagnosis Date Noted    Tracheitis 12/05/2022    Depression, recurrent (HCC) 07/18/2022    Otalgia 02/03/2022    Trigeminal neuralgia of left side of face 01/20/2022    Primary osteoarthritis of both knees 09/16/2021    Smoking 09/09/2021    Arthritis 09/09/2021    Body mass index 31.0-31.9, adult 01/14/2021    Pneumococcal vaccination declined     Influenza vaccination declined     Uncontrolled type 2 diabetes mellitus with hyperglycemia (HCC)     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HCC)     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HCC)     Obstructive sleep apnea syndrome     Hypertension, essential     Mixed hyperlipidemia     Gastroesophageal reflux disease     Dysthymic disorder     Osteoporosis     Diabetes mellitus due to underlying condition with diabetic nephropathy, without long-term current use of insulin (HCC)      She  has a past surgical history that includes Hysterectomy; Cholecystectomy; Colonoscopy (01/12/2012); and Mammo (historical) (02/04/2016).  Her family history includes Breast cancer in her cousin; Breast cancer (age of onset: 58) in her sister; Cancer in her sister; Diabetes in her mother; No Known Problems in her maternal aunt, maternal aunt, maternal aunt, maternal aunt, maternal aunt, maternal aunt, maternal grandmother, paternal aunt, and paternal grandmother; Stroke in her father.  She  reports that she has been smoking cigarettes. She started smoking about 51 years ago. She has a 25.6 pack-year smoking history. She has never been exposed to tobacco smoke. She has quit using smokeless tobacco. She reports that she does not drink alcohol and does not use drugs.  Current Outpatient Medications   Medication Sig Dispense Refill    aspirin (ECOTRIN LOW STRENGTH) 81 mg EC tablet Take 81 mg by mouth daily      olmesartan (BENICAR) 40 mg  tablet Take 0.5 tablets (20 mg total) by mouth 2 (two) times a day 90 tablet 2    rosuvastatin (CRESTOR) 5 mg tablet Take 1 tablet (5 mg total) by mouth daily 90 tablet 3    sitaGLIPtin (JANUVIA) 100 mg tablet Take 1 tablet (100 mg total) by mouth daily 30 tablet 4    metFORMIN (GLUCOPHAGE) 1000 MG tablet Take 1 tablet (1,000 mg total) by mouth 2 (two) times a day with meals (Patient not taking: Reported on 4/19/2024) 180 tablet 1     No current facility-administered medications for this visit.     Current Outpatient Medications on File Prior to Visit   Medication Sig    aspirin (ECOTRIN LOW STRENGTH) 81 mg EC tablet Take 81 mg by mouth daily    olmesartan (BENICAR) 40 mg tablet Take 0.5 tablets (20 mg total) by mouth 2 (two) times a day    rosuvastatin (CRESTOR) 5 mg tablet Take 1 tablet (5 mg total) by mouth daily    sitaGLIPtin (JANUVIA) 100 mg tablet Take 1 tablet (100 mg total) by mouth daily    metFORMIN (GLUCOPHAGE) 1000 MG tablet Take 1 tablet (1,000 mg total) by mouth 2 (two) times a day with meals (Patient not taking: Reported on 4/19/2024)     No current facility-administered medications on file prior to visit.     She is allergic to nuts - food allergy, atorvastatin, ezetimibe, latex, lisinopril, methotrexate, other, penicillins, pravastatin, and shellfish allergy - food allergy..    Review of Systems   Constitutional:  Negative for chills and fever.   HENT:  Negative for congestion, ear pain and sore throat.    Eyes:  Negative for pain.   Respiratory:  Negative for cough and shortness of breath.    Cardiovascular:  Negative for chest pain and leg swelling.   Gastrointestinal:  Negative for abdominal pain, nausea and vomiting.   Endocrine: Negative for polyuria.   Genitourinary:  Negative for difficulty urinating, frequency and urgency.   Musculoskeletal:  Positive for back pain. Negative for arthralgias.   Skin:  Negative for rash.   Neurological:  Negative for weakness and headaches.  "  Psychiatric/Behavioral:  Negative for sleep disturbance. The patient is not nervous/anxious.          Objective:      /82 (BP Location: Left arm, Patient Position: Sitting, Cuff Size: Standard)   Pulse 77   Temp 98.3 °F (36.8 °C) (Temporal)   Ht 5' 1\" (1.549 m)   Wt 71.7 kg (158 lb)   SpO2 97%   BMI 29.85 kg/m²     No results found for this or any previous visit (from the past 1344 hour(s)).     Physical Exam  Constitutional:       Appearance: Normal appearance.   HENT:      Head: Normocephalic.      Right Ear: External ear normal.      Left Ear: External ear normal.      Nose: Nose normal. No congestion.      Mouth/Throat:      Mouth: Mucous membranes are moist.      Pharynx: Oropharynx is clear. No oropharyngeal exudate or posterior oropharyngeal erythema.   Eyes:      Extraocular Movements: Extraocular movements intact.      Conjunctiva/sclera: Conjunctivae normal.   Cardiovascular:      Rate and Rhythm: Normal rate and regular rhythm.      Heart sounds: Normal heart sounds. No murmur heard.  Pulmonary:      Effort: Pulmonary effort is normal.      Breath sounds: Normal breath sounds. No wheezing or rales.   Abdominal:      General: Abdomen is flat. There is no distension.      Palpations: Abdomen is soft.      Tenderness: There is no abdominal tenderness.   Musculoskeletal:      Cervical back: Normal range of motion and neck supple.      Right lower leg: No edema.      Left lower leg: No edema.      Comments: Neck-paraspinous spasm present, tenderness present, movement painful and restricted due to pain, no rash    Low back exam-mild paraspinous spasm and tenderness present         Lymphadenopathy:      Cervical: No cervical adenopathy.   Skin:     General: Skin is warm.   Neurological:      General: No focal deficit present.      Mental Status: She is alert and oriented to person, place, and time.           "

## 2024-05-06 ENCOUNTER — RA CDI HCC (OUTPATIENT)
Dept: OTHER | Facility: HOSPITAL | Age: 69
End: 2024-05-06

## 2024-05-13 ENCOUNTER — OFFICE VISIT (OUTPATIENT)
Dept: INTERNAL MEDICINE CLINIC | Facility: CLINIC | Age: 69
End: 2024-05-13
Payer: COMMERCIAL

## 2024-05-13 VITALS
HEART RATE: 68 BPM | TEMPERATURE: 98.1 F | OXYGEN SATURATION: 98 % | DIASTOLIC BLOOD PRESSURE: 78 MMHG | BODY MASS INDEX: 30.21 KG/M2 | WEIGHT: 160 LBS | SYSTOLIC BLOOD PRESSURE: 126 MMHG | HEIGHT: 61 IN

## 2024-05-13 DIAGNOSIS — M51.36 DEGENERATIVE DISC DISEASE, LUMBAR: ICD-10-CM

## 2024-05-13 DIAGNOSIS — E11.65 UNCONTROLLED TYPE 2 DIABETES MELLITUS WITH HYPERGLYCEMIA (HCC): ICD-10-CM

## 2024-05-13 DIAGNOSIS — E78.2 MIXED HYPERLIPIDEMIA: ICD-10-CM

## 2024-05-13 DIAGNOSIS — M50.30 DDD (DEGENERATIVE DISC DISEASE), CERVICAL: Primary | ICD-10-CM

## 2024-05-13 DIAGNOSIS — I10 HYPERTENSION, ESSENTIAL: ICD-10-CM

## 2024-05-13 PROBLEM — M51.369 DEGENERATIVE DISC DISEASE, LUMBAR: Status: ACTIVE | Noted: 2024-05-13

## 2024-05-13 PROCEDURE — G2211 COMPLEX E/M VISIT ADD ON: HCPCS | Performed by: INTERNAL MEDICINE

## 2024-05-13 PROCEDURE — 99214 OFFICE O/P EST MOD 30 MIN: CPT | Performed by: INTERNAL MEDICINE

## 2024-05-13 NOTE — PROGRESS NOTES
Assessment/Plan:             1. DDD (degenerative disc disease), cervical  Comments:  better    2. Degenerative disc disease, lumbar  Comments:  better    3. Uncontrolled type 2 diabetes mellitus with hyperglycemia (HCC)  Comments:  Continue same medication    4. Mixed hyperlipidemia  Comments:  Continue same medication    5. Hypertension, essential  Comments:  stable,Continue same medication           Subjective:      Patient ID: Rosmery Angulo is a 68 y.o. female.    Follow-up on multiple medical problems to ensure they are stable on current medication, low back pain and neck pain better, x-ray neck and lumbar spine discussed with her,        The following portions of the patient's history were reviewed and updated as appropriate: She  has a past medical history of Degenerative joint disease of knee, Depression with anxiety, Diabetes mellitus ([facility]), Diabetes mellitus due to underlying condition with diabetic nephropathy ([facility]), Dysthymic disorder, Esophageal reflux, Essential (primary) hypertension, Fibromyalgia, Gastroesophageal reflux disease, Hypercholesterolemia, Hyperlipidemia, Hypertension, essential, Influenza vaccination declined, Obstructive sleep apnea (adult) (pediatric), Osteoporosis, 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[facility]), Other specified disorders of white blood cells, Pneumococcal vaccination declined, Psoriatic arthropathy (HCC), Rheumatoid arthritis (HCC), 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  She   Patient Active Problem List    Diagnosis Date Noted    DDD (degenerative disc disease), cervical 05/13/2024    Degenerative disc disease, lumbar 05/13/2024    Tracheitis 12/05/2022    Depression, recurrent (HCC) 07/18/2022    Otalgia 02/03/2022    Trigeminal neuralgia of left side of face 01/20/2022    Primary osteoarthritis of both knees 09/16/2021    Smoking 09/09/2021    Arthritis 09/09/2021     Body mass index 31.0-31.9, adult 01/14/2021    Pneumococcal vaccination declined     Influenza vaccination declined     Uncontrolled type 2 diabetes mellitus with hyperglycemia (HCC)     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HCC)     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HCC)     Obstructive sleep apnea syndrome     Hypertension, essential     Mixed hyperlipidemia     Gastroesophageal reflux disease     Dysthymic disorder     Osteoporosis     Diabetes mellitus due to underlying condition with diabetic nephropathy, without long-term current use of insulin (HCC)      She  has a past surgical history that includes Hysterectomy; Cholecystectomy; Colonoscopy (01/12/2012); and Mammo (historical) (02/04/2016).  Her family history includes Breast cancer in her cousin; Breast cancer (age of onset: 58) in her sister; Cancer in her sister; Diabetes in her mother; No Known Problems in her maternal aunt, maternal aunt, maternal aunt, maternal aunt, maternal aunt, maternal aunt, maternal grandmother, paternal aunt, and paternal grandmother; Stroke in her father.  She  reports that she has been smoking cigarettes. She started smoking about 51 years ago. She has a 25.7 pack-year smoking history. She has never been exposed to tobacco smoke. She has quit using smokeless tobacco. She reports that she does not drink alcohol and does not use drugs.  Current Outpatient Medications   Medication Sig Dispense Refill    aspirin (ECOTRIN LOW STRENGTH) 81 mg EC tablet Take 81 mg by mouth daily      metFORMIN (GLUCOPHAGE) 1000 MG tablet Take 1 tablet (1,000 mg total) by mouth 2 (two) times a day with meals 180 tablet 1    olmesartan (BENICAR) 40 mg tablet Take 0.5 tablets (20 mg total) by mouth 2 (two) times a day 90 tablet 2    rosuvastatin (CRESTOR) 5 mg tablet Take 1 tablet (5 mg total) by mouth daily 90 tablet 3    sitaGLIPtin (JANUVIA) 100 mg tablet Take 1 tablet (100 mg total) by mouth daily 30 tablet 4     No current  "facility-administered medications for this visit.     Current Outpatient Medications on File Prior to Visit   Medication Sig    aspirin (ECOTRIN LOW STRENGTH) 81 mg EC tablet Take 81 mg by mouth daily    metFORMIN (GLUCOPHAGE) 1000 MG tablet Take 1 tablet (1,000 mg total) by mouth 2 (two) times a day with meals    olmesartan (BENICAR) 40 mg tablet Take 0.5 tablets (20 mg total) by mouth 2 (two) times a day    rosuvastatin (CRESTOR) 5 mg tablet Take 1 tablet (5 mg total) by mouth daily    sitaGLIPtin (JANUVIA) 100 mg tablet Take 1 tablet (100 mg total) by mouth daily     No current facility-administered medications on file prior to visit.     She is allergic to nuts - food allergy, atorvastatin, ezetimibe, latex, lisinopril, methotrexate, other, penicillins, pravastatin, and shellfish allergy - food allergy..    Review of Systems   Constitutional:  Negative for chills and fever.   HENT:  Negative for congestion, ear pain and sore throat.    Eyes:  Negative for pain.   Respiratory:  Negative for cough and shortness of breath.    Cardiovascular:  Negative for chest pain and leg swelling.   Gastrointestinal:  Negative for abdominal pain, nausea and vomiting.   Endocrine: Negative for polyuria.   Genitourinary:  Negative for difficulty urinating, frequency and urgency.   Musculoskeletal:  Positive for arthralgias and back pain.   Skin:  Negative for rash.   Neurological:  Negative for weakness and headaches.   Psychiatric/Behavioral:  Negative for sleep disturbance. The patient is not nervous/anxious.          Objective:      /78 (BP Location: Left arm, Patient Position: Standing, Cuff Size: Standard)   Pulse 68   Temp 98.1 °F (36.7 °C) (Temporal)   Ht 5' 1\" (1.549 m)   Wt 72.6 kg (160 lb)   SpO2 98%   BMI 30.23 kg/m²     No results found for this or any previous visit (from the past 1344 hour(s)).     Physical Exam  Constitutional:       Appearance: Normal appearance.   HENT:      Head: Normocephalic.      " Right Ear: External ear normal.      Left Ear: External ear normal.      Nose: Nose normal. No congestion.      Mouth/Throat:      Mouth: Mucous membranes are moist.      Pharynx: Oropharynx is clear. No oropharyngeal exudate or posterior oropharyngeal erythema.   Eyes:      Extraocular Movements: Extraocular movements intact.      Conjunctiva/sclera: Conjunctivae normal.   Cardiovascular:      Rate and Rhythm: Normal rate and regular rhythm.      Heart sounds: Normal heart sounds. No murmur heard.  Pulmonary:      Effort: Pulmonary effort is normal.      Breath sounds: Normal breath sounds. No wheezing or rales.   Abdominal:      General: Abdomen is flat. There is no distension.      Palpations: Abdomen is soft.      Tenderness: There is no abdominal tenderness.   Musculoskeletal:      Cervical back: Normal range of motion and neck supple.      Right lower leg: No edema.      Left lower leg: No edema.   Lymphadenopathy:      Cervical: No cervical adenopathy.   Skin:     General: Skin is warm.   Neurological:      General: No focal deficit present.      Mental Status: She is alert and oriented to person, place, and time.

## 2024-05-22 ENCOUNTER — HOSPITAL ENCOUNTER (OUTPATIENT)
Dept: RADIOLOGY | Age: 69
Discharge: HOME | End: 2024-05-22
Payer: COMMERCIAL

## 2024-05-22 VITALS — BODY MASS INDEX: 30.21 KG/M2 | HEIGHT: 61 IN | WEIGHT: 160 LBS

## 2024-05-22 DIAGNOSIS — Z12.31 SCREENING MAMMOGRAM FOR HIGH-RISK PATIENT: ICD-10-CM

## 2024-05-22 PROCEDURE — 77063 BREAST TOMOSYNTHESIS BI: CPT

## 2024-05-22 PROCEDURE — 77067 SCR MAMMO BI INCL CAD: CPT

## 2024-06-14 ENCOUNTER — NURSE TRIAGE (OUTPATIENT)
Age: 69
End: 2024-06-14

## 2024-06-14 ENCOUNTER — OFFICE VISIT (OUTPATIENT)
Dept: INTERNAL MEDICINE CLINIC | Facility: CLINIC | Age: 69
End: 2024-06-14
Payer: COMMERCIAL

## 2024-06-14 VITALS
TEMPERATURE: 97.6 F | BODY MASS INDEX: 29.83 KG/M2 | SYSTOLIC BLOOD PRESSURE: 124 MMHG | DIASTOLIC BLOOD PRESSURE: 84 MMHG | HEIGHT: 61 IN | WEIGHT: 158 LBS | HEART RATE: 73 BPM | OXYGEN SATURATION: 96 %

## 2024-06-14 DIAGNOSIS — I10 HYPERTENSION, ESSENTIAL: ICD-10-CM

## 2024-06-14 DIAGNOSIS — M51.36 DEGENERATIVE DISC DISEASE, LUMBAR: Primary | ICD-10-CM

## 2024-06-14 DIAGNOSIS — E78.2 MIXED HYPERLIPIDEMIA: ICD-10-CM

## 2024-06-14 PROCEDURE — G2211 COMPLEX E/M VISIT ADD ON: HCPCS | Performed by: INTERNAL MEDICINE

## 2024-06-14 PROCEDURE — 99213 OFFICE O/P EST LOW 20 MIN: CPT | Performed by: INTERNAL MEDICINE

## 2024-06-14 RX ORDER — NAPROXEN 500 MG/1
500 TABLET ORAL 2 TIMES DAILY WITH MEALS
Qty: 20 TABLET | Refills: 0 | Status: SHIPPED | OUTPATIENT
Start: 2024-06-14

## 2024-06-14 RX ORDER — LIDOCAINE 50 MG/G
1 PATCH TOPICAL EVERY 24 HOURS
COMMUNITY
Start: 2024-06-12 | End: 2025-06-12

## 2024-06-14 RX ORDER — TIZANIDINE 2 MG/1
2 TABLET ORAL EVERY 6 HOURS PRN
COMMUNITY
Start: 2024-06-12 | End: 2024-06-22

## 2024-06-14 NOTE — TELEPHONE ENCOUNTER
"Pt called with excruciating pain in her R hip that radiates down to her groin. Pt states that the pain is a 10/10 when it comes it feels like a sharp stabbing pain. Pt has been having this for 5 days. Pt recently went to ED 2 days ago and they did CT scan and didn't see anything. Prescribed pt a muscle relaxer. Pt states that helps a little but nothing helped it last night. Pt didn't sleep at all.     Pt would like to be seen in office. However, no appointments available until Monday. Called office clinical line. No answer. Called office clerical line and spoke with Sarah. Warm transferred pt for further advisement.           Reason for Disposition   Patient wants to be seen    Answer Assessment - Initial Assessment Questions  1. LOCATION and RADIATION: \"Where is the pain located?\"       R side of hip and radiates to groin   2. QUALITY: \"What does the pain feel like?\"  (e.g., sharp, dull, aching, burning)      Intense pain sharp   3. SEVERITY: \"How bad is the pain?\" \"What does it keep you from doing?\"   (Scale 1-10; or mild, moderate, severe)    -  MILD (1-3): doesn't interfere with normal activities     -  MODERATE (4-7): interferes with normal activities (e.g., work or school) or awakens from sleep, limping     -  SEVERE (8-10): excruciating pain, unable to do any normal activities, unable to walk      10/10 comes and goes   4. ONSET: \"When did the pain start?\" \"Does it come and go, or is it there all the time?\"      5 days ago and 2 days ago pt went to ED already and had no help   5. WORK OR EXERCISE: \"Has there been any recent work or exercise that involved this part of the body?\"       Denies   6. CAUSE: \"What do you think is causing the hip pain?\"       Unsure  7. AGGRAVATING FACTORS: \"What makes the hip pain worse?\" (e.g., walking, climbing stairs, running)      When pt eats an hour later the pain shows up.   8. OTHER SYMPTOMS: \"Do you have any other symptoms?\" (e.g., back pain, pain shooting down leg,  " fever, rash)      Denies       Pt was in ED for this and they did CT scan and didn't see anything. Pt is taking muscle relaxer and it felt a little better but then in the am the pain is worse.    Protocols used: Hip Pain-ADULT-[place]

## 2024-06-14 NOTE — PROGRESS NOTES
Assessment/Plan:             1. Degenerative disc disease, lumbar  -     naproxen (Naprosyn) 500 mg tablet; Take 1 tablet (500 mg total) by mouth 2 (two) times a day with meals  2. Hypertension, essential  3. Mixed hyperlipidemia         Subjective:      Patient ID: Rosmery Angulo is a 68 y.o. female.    Low back pain, right side, no fever no chills no urinary issue    Hip Pain         The following portions of the patient's history were reviewed and updated as appropriate: She  has a past medical history of Degenerative joint disease of knee, Depression with anxiety, Diabetes mellitus (HCC), Diabetes mellitus due to underlying condition with diabetic nephropathy (HCC), Dysthymic disorder, Esophageal reflux, Essential (primary) hypertension, Fibromyalgia, Gastroesophageal reflux disease, Hypercholesterolemia, Hyperlipidemia, Hypertension, essential, Influenza vaccination declined, Obstructive sleep apnea (adult) (pediatric), Osteoporosis, 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[facility]), Other specified disorders of white blood cells, Pneumococcal vaccination declined, Psoriatic arthropathy (HCC), Rheumatoid arthritis (HCC), 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  Her family history includes Breast cancer in her cousin; Breast cancer (age of onset: 58) in her sister; Cancer in her sister; Diabetes in her mother; No Known Problems in her maternal aunt, maternal aunt, maternal aunt, maternal aunt, maternal aunt, maternal aunt, maternal grandfather, maternal grandmother, paternal aunt, paternal grandfather, and paternal grandmother; Stroke in her father.  She  reports that she has been smoking cigarettes. She started smoking about 51 years ago. She has a 25.7 pack-year smoking history. She has never been exposed to tobacco smoke. She has quit using smokeless tobacco. She reports that she does not drink alcohol and does not use drugs.  Current Outpatient Medications   Medication Sig Dispense Refill    aspirin (ECOTRIN LOW STRENGTH) 81 mg EC tablet Take 81 mg by mouth daily      lidocaine (LIDODERM) 5 % Place 1 patch on the skin every 24 hours      metFORMIN (GLUCOPHAGE) 1000 MG tablet Take 1 tablet (1,000 mg total) by mouth 2 (two) times a day with meals 180 tablet 1    naproxen (Naprosyn) 500 mg tablet Take 1 tablet (500 mg total) by mouth 2 (two) times a day with meals 20 tablet 0    olmesartan (BENICAR) 40 mg tablet Take 0.5 tablets (20 mg total) by mouth 2 (two) times a day 90 tablet 2    rosuvastatin (CRESTOR) 5 mg tablet Take 1 tablet (5 mg total) by mouth daily 90 tablet 3    sitaGLIPtin (JANUVIA) 100 mg tablet Take 1 tablet (100 mg total) by mouth daily 30 tablet 4    tiZANidine (ZANAFLEX) 2 mg tablet Take 2 mg by mouth every  "6 (six) hours as needed       No current facility-administered medications for this visit.     Current Outpatient Medications on File Prior to Visit   Medication Sig    aspirin (ECOTRIN LOW STRENGTH) 81 mg EC tablet Take 81 mg by mouth daily    lidocaine (LIDODERM) 5 % Place 1 patch on the skin every 24 hours    metFORMIN (GLUCOPHAGE) 1000 MG tablet Take 1 tablet (1,000 mg total) by mouth 2 (two) times a day with meals    olmesartan (BENICAR) 40 mg tablet Take 0.5 tablets (20 mg total) by mouth 2 (two) times a day    rosuvastatin (CRESTOR) 5 mg tablet Take 1 tablet (5 mg total) by mouth daily    sitaGLIPtin (JANUVIA) 100 mg tablet Take 1 tablet (100 mg total) by mouth daily    tiZANidine (ZANAFLEX) 2 mg tablet Take 2 mg by mouth every 6 (six) hours as needed     No current facility-administered medications on file prior to visit.     She is allergic to nuts - food allergy, atorvastatin, ezetimibe, latex, lisinopril, methotrexate, other, penicillins, pravastatin, and shellfish allergy - food allergy..    Review of Systems   Constitutional:  Negative for chills and fever.   HENT:  Negative for congestion, ear pain and sore throat.    Eyes:  Negative for pain.   Respiratory:  Negative for cough and shortness of breath.    Cardiovascular:  Negative for chest pain and leg swelling.   Gastrointestinal:  Negative for abdominal pain, nausea and vomiting.   Endocrine: Negative for polyuria.   Genitourinary:  Negative for difficulty urinating, frequency and urgency.   Musculoskeletal:  Positive for arthralgias and back pain.   Skin:  Negative for rash.   Neurological:  Negative for weakness and headaches.   Psychiatric/Behavioral:  Negative for sleep disturbance. The patient is not nervous/anxious.          Objective:      /84 (BP Location: Left arm, Patient Position: Sitting, Cuff Size: Standard)   Pulse 73   Temp 97.6 °F (36.4 °C) (Temporal)   Ht 5' 1\" (1.549 m)   Wt 71.7 kg (158 lb)   SpO2 96%   BMI 29.85 kg/m² "     Recent Results (from the past 1344 hour(s))   COMPREHENSIVE METABOLIC PANEL    Collection Time: 06/12/24  7:31 PM   Result Value Ref Range    Glucose 231 (H) 65 - 99 mg/dL    BUN 14 7 - 25 mg/dL    Creatinine 0.89 0.40 - 1.10 mg/dL    Sodium 140 135 - 145 mmol/L    Potassium 3.9 3.5 - 5.2 mmol/L    Chloride 108 100 - 109 mmol/L    Carbon Dioxide 25 21 - 31 mmol/L    Calcium 9.5 8.5 - 10.1 mg/dL    Alkaline Phosphatase 58 35 - 120 U/L    ALBUMIN 4.4 3.5 - 5.7 g/dL    Total Bilirubin 0.2 0.2 - 1.0 mg/dL    Protein, Total 7.2 6.3 - 8.3 g/dL    AST 13 <41 U/L    ALT 15 <56 U/L    ANION GAP 7 3 - 11    eGFRcr 70 >59    eGFR Comment Interpretive information: calculated GFR         Physical Exam  Constitutional:       Appearance: Normal appearance.   HENT:      Head: Normocephalic.      Right Ear: External ear normal.      Left Ear: External ear normal.      Nose: Nose normal. No congestion.      Mouth/Throat:      Mouth: Mucous membranes are moist.      Pharynx: Oropharynx is clear. No oropharyngeal exudate or posterior oropharyngeal erythema.   Eyes:      Extraocular Movements: Extraocular movements intact.      Conjunctiva/sclera: Conjunctivae normal.   Cardiovascular:      Rate and Rhythm: Normal rate and regular rhythm.      Heart sounds: Normal heart sounds. No murmur heard.  Pulmonary:      Effort: Pulmonary effort is normal.      Breath sounds: Normal breath sounds. No wheezing or rales.   Abdominal:      General: Abdomen is flat. There is no distension.      Palpations: Abdomen is soft.      Tenderness: There is no abdominal tenderness.   Musculoskeletal:      Cervical back: Normal range of motion and neck supple.      Right lower leg: No edema.      Left lower leg: No edema.      Comments: Low back exam does right side paraspinous spasm tenderness present, more painful and restricted due to pain, SLR was feeling pain on the right SLR on the lower back pain on the right side   Lymphadenopathy:      Cervical:  No cervical adenopathy.   Skin:     General: Skin is warm.   Neurological:      General: No focal deficit present.      Mental Status: She is alert and oriented to person, place, and time.

## 2024-07-03 DIAGNOSIS — M51.36 DEGENERATIVE DISC DISEASE, LUMBAR: ICD-10-CM

## 2024-07-03 RX ORDER — NAPROXEN 500 MG/1
500 TABLET ORAL 2 TIMES DAILY WITH MEALS
Qty: 20 TABLET | Refills: 0 | Status: SHIPPED | OUTPATIENT
Start: 2024-07-03

## 2024-07-08 DIAGNOSIS — E11.65 UNCONTROLLED TYPE 2 DIABETES MELLITUS WITH HYPERGLYCEMIA (HCC): ICD-10-CM

## 2024-07-08 NOTE — TELEPHONE ENCOUNTER
Reason for call: 2D left   [x] Refill   [] Prior Auth  [] Other:     Office:   [x] PCP/Provider - Dr Ramirez  [] Specialty/Provider -     Medication: Januvia    Dose/Frequency: 100 mg daily     Quantity: 90D     Pharmacy: [facility] [address] on file     Does the patient have enough for 3 days?   [] Yes   [x] No - Send as HP to POD

## 2024-08-07 ENCOUNTER — APPOINTMENT (OUTPATIENT)
Dept: LAB | Facility: CLINIC | Age: 69
End: 2024-08-07
Payer: COMMERCIAL

## 2024-08-07 DIAGNOSIS — E11.65 UNCONTROLLED TYPE 2 DIABETES MELLITUS WITH HYPERGLYCEMIA (HCC): ICD-10-CM

## 2024-08-07 LAB
ALBUMIN SERPL BCG-MCNC: 4.4 G/DL (ref 3.5–5)
ALP SERPL-CCNC: 46 U/L (ref 34–104)
ALT SERPL W P-5'-P-CCNC: 21 U/L (ref 7–52)
ANION GAP SERPL CALCULATED.3IONS-SCNC: 7 MMOL/L (ref 4–13)
AST SERPL W P-5'-P-CCNC: 16 U/L (ref 13–39)
BASOPHILS # BLD AUTO: 0.05 THOUSANDS/ÂΜL (ref 0–0.1)
BASOPHILS NFR BLD AUTO: 1 % (ref 0–1)
BILIRUB SERPL-MCNC: 0.45 MG/DL (ref 0.2–1)
BUN SERPL-MCNC: 12 MG/DL (ref 5–25)
CALCIUM SERPL-MCNC: 9.5 MG/DL (ref 8.4–10.2)
CHLORIDE SERPL-SCNC: 109 MMOL/L (ref 96–108)
CHOLEST SERPL-MCNC: 128 MG/DL
CO2 SERPL-SCNC: 24 MMOL/L (ref 21–32)
CREAT SERPL-MCNC: 0.87 MG/DL (ref 0.6–1.3)
CREAT UR-MCNC: 193 MG/DL
EOSINOPHIL # BLD AUTO: 0.32 THOUSAND/ÂΜL (ref 0–0.61)
EOSINOPHIL NFR BLD AUTO: 4 % (ref 0–6)
ERYTHROCYTE [DISTWIDTH] IN BLOOD BY AUTOMATED COUNT: 12.9 % (ref 11.6–15.1)
EST. AVERAGE GLUCOSE BLD GHB EST-MCNC: 146 MG/DL
GFR SERPL CREATININE-BSD FRML MDRD: 68 ML/MIN/1.73SQ M
GLUCOSE P FAST SERPL-MCNC: 142 MG/DL (ref 65–99)
HBA1C MFR BLD: 6.7 %
HCT VFR BLD AUTO: 47.3 % (ref 34.8–46.1)
HDLC SERPL-MCNC: 34 MG/DL
HGB BLD-MCNC: 15.5 G/DL (ref 11.5–15.4)
IMM GRANULOCYTES # BLD AUTO: 0.02 THOUSAND/UL (ref 0–0.2)
IMM GRANULOCYTES NFR BLD AUTO: 0 % (ref 0–2)
LDLC SERPL CALC-MCNC: 50 MG/DL (ref 0–100)
LYMPHOCYTES # BLD AUTO: 2.67 THOUSANDS/ÂΜL (ref 0.6–4.47)
LYMPHOCYTES NFR BLD AUTO: 37 % (ref 14–44)
MCH RBC QN AUTO: 30.2 PG (ref 26.8–34.3)
MCHC RBC AUTO-ENTMCNC: 32.8 G/DL (ref 31.4–37.4)
MCV RBC AUTO: 92 FL (ref 82–98)
MICROALBUMIN UR-MCNC: 32.2 MG/L
MICROALBUMIN/CREAT 24H UR: 17 MG/G CREATININE (ref 0–30)
MONOCYTES # BLD AUTO: 0.56 THOUSAND/ÂΜL (ref 0.17–1.22)
MONOCYTES NFR BLD AUTO: 8 % (ref 4–12)
NEUTROPHILS # BLD AUTO: 3.6 THOUSANDS/ÂΜL (ref 1.85–7.62)
NEUTS SEG NFR BLD AUTO: 50 % (ref 43–75)
NRBC BLD AUTO-RTO: 0 /100 WBCS
PLATELET # BLD AUTO: 193 THOUSANDS/UL (ref 149–390)
PMV BLD AUTO: 11.2 FL (ref 8.9–12.7)
POTASSIUM SERPL-SCNC: 4.1 MMOL/L (ref 3.5–5.3)
PROT SERPL-MCNC: 7.5 G/DL (ref 6.4–8.4)
RBC # BLD AUTO: 5.13 MILLION/UL (ref 3.81–5.12)
SODIUM SERPL-SCNC: 140 MMOL/L (ref 135–147)
TRIGL SERPL-MCNC: 221 MG/DL
TSH SERPL DL<=0.05 MIU/L-ACNC: 2.25 UIU/ML (ref 0.45–4.5)
WBC # BLD AUTO: 7.22 THOUSAND/UL (ref 4.31–10.16)

## 2024-08-07 PROCEDURE — 83036 HEMOGLOBIN GLYCOSYLATED A1C: CPT

## 2024-08-07 PROCEDURE — 80053 COMPREHEN METABOLIC PANEL: CPT

## 2024-08-07 PROCEDURE — 80061 LIPID PANEL: CPT

## 2024-08-07 PROCEDURE — 82570 ASSAY OF URINE CREATININE: CPT

## 2024-08-07 PROCEDURE — 84443 ASSAY THYROID STIM HORMONE: CPT

## 2024-08-07 PROCEDURE — 82043 UR ALBUMIN QUANTITATIVE: CPT

## 2024-08-07 PROCEDURE — 85025 COMPLETE CBC W/AUTO DIFF WBC: CPT

## 2024-08-07 PROCEDURE — 36415 COLL VENOUS BLD VENIPUNCTURE: CPT

## 2024-08-08 ENCOUNTER — RA CDI HCC (OUTPATIENT)
Dept: OTHER | Facility: HOSPITAL | Age: 69
End: 2024-08-08

## 2024-08-19 ENCOUNTER — OFFICE VISIT (OUTPATIENT)
Dept: INTERNAL MEDICINE CLINIC | Facility: CLINIC | Age: 69
End: 2024-08-19
Payer: COMMERCIAL

## 2024-08-19 VITALS
HEART RATE: 75 BPM | DIASTOLIC BLOOD PRESSURE: 70 MMHG | TEMPERATURE: 98 F | BODY MASS INDEX: 30.82 KG/M2 | OXYGEN SATURATION: 99 % | HEIGHT: 60 IN | WEIGHT: 157 LBS | SYSTOLIC BLOOD PRESSURE: 132 MMHG

## 2024-08-19 DIAGNOSIS — M17.0 PRIMARY OSTEOARTHRITIS OF BOTH KNEES: ICD-10-CM

## 2024-08-19 DIAGNOSIS — E11.65 UNCONTROLLED TYPE 2 DIABETES MELLITUS WITH HYPERGLYCEMIA (HCC): Primary | ICD-10-CM

## 2024-08-19 DIAGNOSIS — E78.2 MIXED HYPERLIPIDEMIA: ICD-10-CM

## 2024-08-19 DIAGNOSIS — M05.40 RHEUMATOID MYOPATHY WITH RHEUMATOID ARTHRITIS (HCC): ICD-10-CM

## 2024-08-19 DIAGNOSIS — I10 HYPERTENSION, ESSENTIAL: ICD-10-CM

## 2024-08-19 DIAGNOSIS — M51.36 DEGENERATIVE DISC DISEASE, LUMBAR: ICD-10-CM

## 2024-08-19 PROCEDURE — 99214 OFFICE O/P EST MOD 30 MIN: CPT | Performed by: INTERNAL MEDICINE

## 2024-08-19 PROCEDURE — G2211 COMPLEX E/M VISIT ADD ON: HCPCS | Performed by: INTERNAL MEDICINE

## 2024-08-19 RX ORDER — TIZANIDINE 2 MG/1
2 TABLET ORAL EVERY 8 HOURS PRN
Qty: 30 TABLET | Refills: 0 | Status: SHIPPED | OUTPATIENT
Start: 2024-08-19

## 2024-08-19 RX ORDER — OLMESARTAN MEDOXOMIL 40 MG/1
20 TABLET ORAL 2 TIMES DAILY
Qty: 90 TABLET | Refills: 2 | Status: SHIPPED | OUTPATIENT
Start: 2024-08-19

## 2024-08-19 RX ORDER — ROSUVASTATIN CALCIUM 5 MG/1
5 TABLET, COATED ORAL DAILY
Qty: 90 TABLET | Refills: 3 | Status: SHIPPED | OUTPATIENT
Start: 2024-08-19

## 2024-08-19 RX ORDER — TIZANIDINE 2 MG/1
2 TABLET ORAL EVERY 6 HOURS PRN
Qty: 60 TABLET | Refills: 0 | Status: CANCELLED | OUTPATIENT
Start: 2024-08-19

## 2024-08-19 RX ORDER — BLOOD-GLUCOSE SENSOR
1 EACH MISCELLANEOUS
Qty: 6 EACH | Refills: 3 | Status: SHIPPED | OUTPATIENT
Start: 2024-08-19

## 2024-08-19 NOTE — PROGRESS NOTES
Assessment/Plan:      Depression Screening and Follow-up Plan: Patient was screened for depression during today's encounter. They screened negative with a PHQ-9 score of 0.            1. Uncontrolled type 2 diabetes mellitus with hyperglycemia (HCC)  Comments:  continue same med  Orders:  -     metFORMIN (GLUCOPHAGE) 1000 MG tablet; Take 1 tablet (1,000 mg total) by mouth 2 (two) times a day with meals  -     Continuous Glucose Sensor (FreeStyle Remigio 3 Sensor) MISC; Use 1 each every 14 (fourteen) days  -     sitaGLIPtin (JANUVIA) 100 mg tablet; Take 1 tablet (100 mg total) by mouth daily  2. Mixed hyperlipidemia  Comments:  continue same med  Orders:  -     rosuvastatin (CRESTOR) 5 mg tablet; Take 1 tablet (5 mg total) by mouth daily  3. Hypertension, essential  Comments:  continue same med  Orders:  -     olmesartan (BENICAR) 40 mg tablet; Take 0.5 tablets (20 mg total) by mouth 2 (two) times a day  4. Primary osteoarthritis of both knees  5. 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HCC)  6. Degenerative disc disease, lumbar  -     tiZANidine (ZANAFLEX) 2 mg tablet; Take 1 tablet (2 mg total) by mouth every 8 (eight) hours as needed for muscle spasms         Subjective:      Patient ID: Rosmery Angulo is a 68 y.o. female.    Follow-up on blood test done on 8/7/2024 test discussed with her    Hypertension  Pertinent negatives include no chest pain, headaches or shortness of breath.   Diabetes  Pertinent negatives for hypoglycemia include no headaches or nervousness/anxiousness. Pertinent negatives for diabetes include no chest pain, no polyuria and no weakness.   Hyperlipidemia  Pertinent negatives include no chest pain or shortness of breath.       The following portions of the patient's history were reviewed and updated as appropriate: She  has a past medical history of Degenerative joint disease of knee, Depression with anxiety, Diabetes mellitus (HCC), Diabetes mellitus due to underlying condition with  diabetic nephropathy (HCC), Dysthymic disorder, Esophageal reflux, Essential (primary) hypertension, Fibromyalgia, Gastroesophageal reflux disease, Hypercholesterolemia, Hyperlipidemia, Hypertension, essential, Influenza vaccination declined, Obstructive sleep apnea (adult) (pediatric), Osteoporosis, 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HCC), Other specified disorders of white blood cells, Pneumococcal vaccination declined, Psoriatic arthropathy (HCC), Rheumatoid arthritis (HCC), 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HCC), Right knee pain, Sleep apnea, Tietze's disease, and Type II diabetes mellitus, uncontrolled.  She   Patient Active Problem List    Diagnosis Date Noted    DDD (degenerative disc disease), cervical 05/13/2024    Degenerative disc disease, lumbar 05/13/2024    Tracheitis 12/05/2022    Depression, recurrent (HCC) 07/18/2022    Otalgia 02/03/2022    Trigeminal neuralgia of left side of face 01/20/2022    Primary osteoarthritis of both knees 09/16/2021    Smoking 09/09/2021    Arthritis 09/09/2021    Body mass index 31.0-31.9, adult 01/14/2021    Pneumococcal vaccination declined     Influenza vaccination declined     Uncontrolled type 2 diabetes mellitus with hyperglycemia (HCC)     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HCC)     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HCC)     Obstructive sleep apnea syndrome     Hypertension, essential     Mixed hyperlipidemia     Gastroesophageal reflux disease     Dysthymic disorder     Osteoporosis     Diabetes mellitus due to underlying condition with diabetic nephropathy, without long-term current use of insulin (HCC)      She  has a past surgical history that includes Hysterectomy; Cholecystectomy; Colonoscopy (01/12/2012); and Mammo (historical) (02/04/2016).  Her family history includes Breast cancer in her cousin; Breast cancer (age of onset: 58) in her sister; Cancer in her sister; Diabetes in her mother; No Known Problems in her maternal aunt, maternal  aunt, maternal aunt, maternal aunt, maternal aunt, maternal aunt, maternal grandfather, maternal grandmother, paternal aunt, paternal grandfather, and paternal grandmother; Stroke in her father.  She  reports that she has been smoking cigarettes. She started smoking about 51 years ago. She has a 25.8 pack-year smoking history. She has never been exposed to tobacco smoke. She has quit using smokeless tobacco. She reports that she does not drink alcohol and does not use drugs.  Current Outpatient Medications   Medication Sig Dispense Refill    aspirin (ECOTRIN LOW STRENGTH) 81 mg EC tablet Take 81 mg by mouth daily      Continuous Glucose Sensor (FreeStyle Remigio 3 Sensor) MISC Use 1 each every 14 (fourteen) days 6 each 3    lidocaine (LIDODERM) 5 % Place 1 patch on the skin every 24 hours      metFORMIN (GLUCOPHAGE) 1000 MG tablet Take 1 tablet (1,000 mg total) by mouth 2 (two) times a day with meals 180 tablet 1    naproxen (NAPROSYN) 500 mg tablet TAKE 1 TABLET BY MOUTH TWICE A DAY WITH MEALS 20 tablet 0    olmesartan (BENICAR) 40 mg tablet Take 0.5 tablets (20 mg total) by mouth 2 (two) times a day 90 tablet 2    rosuvastatin (CRESTOR) 5 mg tablet Take 1 tablet (5 mg total) by mouth daily 90 tablet 3    sitaGLIPtin (JANUVIA) 100 mg tablet Take 1 tablet (100 mg total) by mouth daily 90 tablet 1    tiZANidine (ZANAFLEX) 2 mg tablet Take 1 tablet (2 mg total) by mouth every 8 (eight) hours as needed for muscle spasms 30 tablet 0     No current facility-administered medications for this visit.     Current Outpatient Medications on File Prior to Visit   Medication Sig    aspirin (ECOTRIN LOW STRENGTH) 81 mg EC tablet Take 81 mg by mouth daily    lidocaine (LIDODERM) 5 % Place 1 patch on the skin every 24 hours    naproxen (NAPROSYN) 500 mg tablet TAKE 1 TABLET BY MOUTH TWICE A DAY WITH MEALS    [DISCONTINUED] metFORMIN (GLUCOPHAGE) 1000 MG tablet Take 1 tablet (1,000 mg total) by mouth 2 (two) times a day with meals     [DISCONTINUED] olmesartan (BENICAR) 40 mg tablet Take 0.5 tablets (20 mg total) by mouth 2 (two) times a day    [DISCONTINUED] rosuvastatin (CRESTOR) 5 mg tablet Take 1 tablet (5 mg total) by mouth daily    [DISCONTINUED] sitaGLIPtin (JANUVIA) 100 mg tablet Take 1 tablet (100 mg total) by mouth daily    [DISCONTINUED] tiZANidine (ZANAFLEX) 2 mg tablet Take 2 mg by mouth every 6 (six) hours as needed     No current facility-administered medications on file prior to visit.     She is allergic to nuts - food allergy, atorvastatin, ezetimibe, latex, lisinopril, methotrexate, other, penicillins, pravastatin, and shellfish allergy - food allergy..    Review of Systems   Constitutional:  Negative for chills and fever.   HENT:  Negative for congestion, ear pain and sore throat.    Eyes:  Negative for pain.   Respiratory:  Negative for cough and shortness of breath.    Cardiovascular:  Negative for chest pain and leg swelling.   Gastrointestinal:  Negative for abdominal pain, nausea and vomiting.   Endocrine: Negative for polyuria.   Genitourinary:  Negative for difficulty urinating, frequency and urgency.   Musculoskeletal:  Positive for arthralgias. Negative for back pain.   Skin:  Negative for rash.   Neurological:  Negative for weakness and headaches.   Psychiatric/Behavioral:  Negative for sleep disturbance. The patient is not nervous/anxious.          Objective:      /70 (BP Location: Left arm, Patient Position: Sitting, Cuff Size: Adult)   Pulse 75   Temp 98 °F (36.7 °C) (Temporal)   Ht 5' (1.524 m)   Wt 71.2 kg (157 lb)   SpO2 99%   BMI 30.66 kg/m²     Recent Results (from the past 1344 hour(s))   CBC and differential    Collection Time: 08/07/24  7:26 AM   Result Value Ref Range    WBC 7.22 4.31 - 10.16 Thousand/uL    RBC 5.13 (H) 3.81 - 5.12 Million/uL    Hemoglobin 15.5 (H) 11.5 - 15.4 g/dL    Hematocrit 47.3 (H) 34.8 - 46.1 %    MCV 92 82 - 98 fL    MCH 30.2 26.8 - 34.3 pg    MCHC 32.8 31.4 - 37.4  g/dL    RDW 12.9 11.6 - 15.1 %    MPV 11.2 8.9 - 12.7 fL    Platelets 193 149 - 390 Thousands/uL    nRBC 0 /100 WBCs    Segmented % 50 43 - 75 %    Immature Grans % 0 0 - 2 %    Lymphocytes % 37 14 - 44 %    Monocytes % 8 4 - 12 %    Eosinophils Relative 4 0 - 6 %    Basophils Relative 1 0 - 1 %    Absolute Neutrophils 3.60 1.85 - 7.62 Thousands/µL    Absolute Immature Grans 0.02 0.00 - 0.20 Thousand/uL    Absolute Lymphocytes 2.67 0.60 - 4.47 Thousands/µL    Absolute Monocytes 0.56 0.17 - 1.22 Thousand/µL    Eosinophils Absolute 0.32 0.00 - 0.61 Thousand/µL    Basophils Absolute 0.05 0.00 - 0.10 Thousands/µL   Comprehensive metabolic panel    Collection Time: 08/07/24  7:26 AM   Result Value Ref Range    Sodium 140 135 - 147 mmol/L    Potassium 4.1 3.5 - 5.3 mmol/L    Chloride 109 (H) 96 - 108 mmol/L    CO2 24 21 - 32 mmol/L    ANION GAP 7 4 - 13 mmol/L    BUN 12 5 - 25 mg/dL    Creatinine 0.87 0.60 - 1.30 mg/dL    Glucose, Fasting 142 (H) 65 - 99 mg/dL    Calcium 9.5 8.4 - 10.2 mg/dL    AST 16 13 - 39 U/L    ALT 21 7 - 52 U/L    Alkaline Phosphatase 46 34 - 104 U/L    Total Protein 7.5 6.4 - 8.4 g/dL    Albumin 4.4 3.5 - 5.0 g/dL    Total Bilirubin 0.45 0.20 - 1.00 mg/dL    eGFR 68 ml/min/1.73sq m   Lipid Panel with Direct LDL reflex    Collection Time: 08/07/24  7:26 AM   Result Value Ref Range    Cholesterol 128 See Comment mg/dL    Triglycerides 221 (H) See Comment mg/dL    HDL, Direct 34 (L) >=50 mg/dL    LDL Calculated 50 0 - 100 mg/dL   TSH, 3rd generation    Collection Time: 08/07/24  7:26 AM   Result Value Ref Range    TSH 3RD GENERATON 2.247 0.450 - 4.500 uIU/mL   Hemoglobin A1C    Collection Time: 08/07/24  7:26 AM   Result Value Ref Range    Hemoglobin A1C 6.7 (H) Normal 4.0-5.6%; PreDiabetic 5.7-6.4%; Diabetic >=6.5%; Glycemic control for adults with diabetes <7.0% %     mg/dl   Albumin / creatinine urine ratio    Collection Time: 08/07/24  8:53 AM   Result Value Ref Range    Creatinine, Ur  193.0 Reference range not established. mg/dL    Albumin,U,Random 32.2 (H) <20.0 mg/L    Albumin Creat Ratio 17 0 - 30 mg/g creatinine        Physical Exam  Constitutional:       Appearance: Normal appearance.   HENT:      Head: Normocephalic.      Right Ear: Tympanic membrane, ear canal and external ear normal.      Left Ear: Tympanic membrane, ear canal and external ear normal.      Nose: Nose normal. No congestion.      Mouth/Throat:      Mouth: Mucous membranes are moist.      Pharynx: Oropharynx is clear. No oropharyngeal exudate or posterior oropharyngeal erythema.   Eyes:      Extraocular Movements: Extraocular movements intact.      Conjunctiva/sclera: Conjunctivae normal.   Cardiovascular:      Rate and Rhythm: Normal rate and regular rhythm.      Heart sounds: Normal heart sounds. No murmur heard.  Pulmonary:      Effort: Pulmonary effort is normal.      Breath sounds: Normal breath sounds. No wheezing or rales.   Abdominal:      General: Abdomen is flat. There is no distension.      Palpations: Abdomen is soft.      Tenderness: There is no abdominal tenderness.   Musculoskeletal:         General: Normal range of motion.      Cervical back: Normal range of motion and neck supple.      Right lower leg: No edema.      Left lower leg: No edema.   Lymphadenopathy:      Cervical: No cervical adenopathy.   Skin:     General: Skin is warm.   Neurological:      General: No focal deficit present.      Mental Status: She is alert and oriented to person, place, and time.

## 2024-11-29 ENCOUNTER — TELEPHONE (OUTPATIENT)
Age: 69
End: 2024-11-29

## 2024-12-02 DIAGNOSIS — I10 HYPERTENSION, ESSENTIAL: ICD-10-CM

## 2024-12-02 DIAGNOSIS — E78.2 MIXED HYPERLIPIDEMIA: ICD-10-CM

## 2024-12-02 DIAGNOSIS — E53.8 B12 DEFICIENCY: ICD-10-CM

## 2024-12-02 DIAGNOSIS — E11.65 UNCONTROLLED TYPE 2 DIABETES MELLITUS WITH HYPERGLYCEMIA (HCC): Primary | ICD-10-CM

## 2024-12-02 NOTE — TELEPHONE ENCOUNTER
Pt wants to know if she needs labs done prior to her appt on 12/16/24.    Please either send her a [email or web address] msg or call.    Thanks

## 2024-12-02 NOTE — TELEPHONE ENCOUNTER
Order placed in the computer, please check with her, she goes to [facility] lab, fasting, drink water

## 2024-12-09 ENCOUNTER — RA CDI HCC (OUTPATIENT)
Dept: OTHER | Facility: HOSPITAL | Age: 69
End: 2024-12-09

## 2024-12-10 NOTE — TELEPHONE ENCOUNTER
Pt called to confirm appt on 12/16 and was still waiting on a response regarding if she needed labs; advised her orders were entered and required fasting. Pt stated she would go to [facility] to have completed.

## 2024-12-11 ENCOUNTER — APPOINTMENT (OUTPATIENT)
Dept: LAB | Facility: CLINIC | Age: 69
End: 2024-12-11
Payer: COMMERCIAL

## 2024-12-11 DIAGNOSIS — E53.8 B12 DEFICIENCY: ICD-10-CM

## 2024-12-11 DIAGNOSIS — I10 HYPERTENSION, ESSENTIAL: ICD-10-CM

## 2024-12-11 DIAGNOSIS — E78.2 MIXED HYPERLIPIDEMIA: ICD-10-CM

## 2024-12-11 DIAGNOSIS — E11.65 UNCONTROLLED TYPE 2 DIABETES MELLITUS WITH HYPERGLYCEMIA (HCC): ICD-10-CM

## 2024-12-11 LAB
ALBUMIN SERPL BCG-MCNC: 4.3 G/DL (ref 3.5–5)
ALP SERPL-CCNC: 47 U/L (ref 34–104)
ALT SERPL W P-5'-P-CCNC: 18 U/L (ref 7–52)
ANION GAP SERPL CALCULATED.3IONS-SCNC: 11 MMOL/L (ref 4–13)
AST SERPL W P-5'-P-CCNC: 14 U/L (ref 13–39)
BASOPHILS # BLD AUTO: 0.06 THOUSANDS/ÂΜL (ref 0–0.1)
BASOPHILS NFR BLD AUTO: 1 % (ref 0–1)
BILIRUB SERPL-MCNC: 0.44 MG/DL (ref 0.2–1)
BUN SERPL-MCNC: 21 MG/DL (ref 5–25)
CALCIUM SERPL-MCNC: 10 MG/DL (ref 8.4–10.2)
CHLORIDE SERPL-SCNC: 106 MMOL/L (ref 96–108)
CHOLEST SERPL-MCNC: 151 MG/DL (ref ?–200)
CO2 SERPL-SCNC: 24 MMOL/L (ref 21–32)
CREAT SERPL-MCNC: 0.81 MG/DL (ref 0.6–1.3)
CREAT UR-MCNC: 259.6 MG/DL
EOSINOPHIL # BLD AUTO: 0.31 THOUSAND/ÂΜL (ref 0–0.61)
EOSINOPHIL NFR BLD AUTO: 5 % (ref 0–6)
ERYTHROCYTE [DISTWIDTH] IN BLOOD BY AUTOMATED COUNT: 12.6 % (ref 11.6–15.1)
EST. AVERAGE GLUCOSE BLD GHB EST-MCNC: 166 MG/DL
GFR SERPL CREATININE-BSD FRML MDRD: 73 ML/MIN/1.73SQ M
GLUCOSE P FAST SERPL-MCNC: 150 MG/DL (ref 65–99)
HBA1C MFR BLD: 7.4 %
HCT VFR BLD AUTO: 46.6 % (ref 34.8–46.1)
HDLC SERPL-MCNC: 31 MG/DL
HGB BLD-MCNC: 15.4 G/DL (ref 11.5–15.4)
IMM GRANULOCYTES # BLD AUTO: 0.02 THOUSAND/UL (ref 0–0.2)
IMM GRANULOCYTES NFR BLD AUTO: 0 % (ref 0–2)
LDLC SERPL CALC-MCNC: 68 MG/DL (ref 0–100)
LYMPHOCYTES # BLD AUTO: 2.52 THOUSANDS/ÂΜL (ref 0.6–4.47)
LYMPHOCYTES NFR BLD AUTO: 43 % (ref 14–44)
MCH RBC QN AUTO: 30.9 PG (ref 26.8–34.3)
MCHC RBC AUTO-ENTMCNC: 33 G/DL (ref 31.4–37.4)
MCV RBC AUTO: 94 FL (ref 82–98)
MICROALBUMIN UR-MCNC: 36.3 MG/L
MICROALBUMIN/CREAT 24H UR: 14 MG/G CREATININE (ref 0–30)
MONOCYTES # BLD AUTO: 0.38 THOUSAND/ÂΜL (ref 0.17–1.22)
MONOCYTES NFR BLD AUTO: 7 % (ref 4–12)
NEUTROPHILS # BLD AUTO: 2.54 THOUSANDS/ÂΜL (ref 1.85–7.62)
NEUTS SEG NFR BLD AUTO: 44 % (ref 43–75)
NRBC BLD AUTO-RTO: 0 /100 WBCS
PLATELET # BLD AUTO: 161 THOUSANDS/UL (ref 149–390)
PMV BLD AUTO: 11.5 FL (ref 8.9–12.7)
POTASSIUM SERPL-SCNC: 3.9 MMOL/L (ref 3.5–5.3)
PROT SERPL-MCNC: 6.9 G/DL (ref 6.4–8.4)
RBC # BLD AUTO: 4.98 MILLION/UL (ref 3.81–5.12)
SODIUM SERPL-SCNC: 141 MMOL/L (ref 135–147)
TRIGL SERPL-MCNC: 259 MG/DL (ref ?–150)
TSH SERPL DL<=0.05 MIU/L-ACNC: 3.32 UIU/ML (ref 0.45–4.5)
VIT B12 SERPL-MCNC: 213 PG/ML (ref 180–914)
WBC # BLD AUTO: 5.83 THOUSAND/UL (ref 4.31–10.16)

## 2024-12-11 PROCEDURE — 82607 VITAMIN B-12: CPT

## 2024-12-11 PROCEDURE — 84443 ASSAY THYROID STIM HORMONE: CPT

## 2024-12-11 PROCEDURE — 80061 LIPID PANEL: CPT

## 2024-12-11 PROCEDURE — 85025 COMPLETE CBC W/AUTO DIFF WBC: CPT

## 2024-12-11 PROCEDURE — 82570 ASSAY OF URINE CREATININE: CPT

## 2024-12-11 PROCEDURE — 36415 COLL VENOUS BLD VENIPUNCTURE: CPT

## 2024-12-11 PROCEDURE — 80053 COMPREHEN METABOLIC PANEL: CPT

## 2024-12-11 PROCEDURE — 83036 HEMOGLOBIN GLYCOSYLATED A1C: CPT

## 2024-12-11 PROCEDURE — 82043 UR ALBUMIN QUANTITATIVE: CPT

## 2024-12-16 ENCOUNTER — OFFICE VISIT (OUTPATIENT)
Dept: INTERNAL MEDICINE CLINIC | Facility: CLINIC | Age: 69
End: 2024-12-16
Payer: COMMERCIAL

## 2024-12-16 VITALS
DIASTOLIC BLOOD PRESSURE: 80 MMHG | HEART RATE: 79 BPM | OXYGEN SATURATION: 97 % | WEIGHT: 156 LBS | SYSTOLIC BLOOD PRESSURE: 128 MMHG | TEMPERATURE: 98.5 F | HEIGHT: 60 IN | BODY MASS INDEX: 30.63 KG/M2

## 2024-12-16 DIAGNOSIS — E78.2 MIXED HYPERLIPIDEMIA: ICD-10-CM

## 2024-12-16 DIAGNOSIS — I10 HYPERTENSION, ESSENTIAL: ICD-10-CM

## 2024-12-16 DIAGNOSIS — L40.59 OTHER PSORIATIC ARTHROPATHY (HCC): ICD-10-CM

## 2024-12-16 DIAGNOSIS — E11.65 UNCONTROLLED TYPE 2 DIABETES MELLITUS WITH HYPERGLYCEMIA (HCC): Primary | ICD-10-CM

## 2024-12-16 DIAGNOSIS — E53.8 B12 DEFICIENCY: ICD-10-CM

## 2024-12-16 DIAGNOSIS — E08.21 DIABETES MELLITUS DUE TO UNDERLYING CONDITION WITH DIABETIC NEPHROPATHY, WITHOUT LONG-TERM CURRENT USE OF INSULIN (HCC): ICD-10-CM

## 2024-12-16 DIAGNOSIS — M05.40 RHEUMATOID MYOPATHY WITH RHEUMATOID ARTHRITIS (HCC): ICD-10-CM

## 2024-12-16 DIAGNOSIS — M17.0 PRIMARY OSTEOARTHRITIS OF BOTH KNEES: ICD-10-CM

## 2024-12-16 PROCEDURE — G2211 COMPLEX E/M VISIT ADD ON: HCPCS | Performed by: INTERNAL MEDICINE

## 2024-12-16 PROCEDURE — 99214 OFFICE O/P EST MOD 30 MIN: CPT | Performed by: INTERNAL MEDICINE

## 2024-12-16 RX ORDER — OLMESARTAN MEDOXOMIL 40 MG/1
20 TABLET ORAL 2 TIMES DAILY
Qty: 90 TABLET | Refills: 2 | Status: SHIPPED | OUTPATIENT
Start: 2024-12-16

## 2024-12-16 RX ORDER — MAGNESIUM 200 MG
1000 TABLET ORAL DAILY
Qty: 100 TABLET | Refills: 1 | Status: SHIPPED | OUTPATIENT
Start: 2024-12-16

## 2024-12-16 RX ORDER — ROSUVASTATIN CALCIUM 5 MG/1
5 TABLET, COATED ORAL DAILY
Qty: 90 TABLET | Refills: 3 | Status: SHIPPED | OUTPATIENT
Start: 2024-12-16

## 2024-12-16 NOTE — PROGRESS NOTES
Assessment/Plan:             1. Uncontrolled type 2 diabetes mellitus with hyperglycemia ([facility])  Comments:  continue same med  Orders:  -     CBC and differential; Future  -     Comprehensive metabolic panel; Future  -     Albumin / creatinine urine ratio; Future  -     Lipid Panel with Direct LDL reflex; Future  -     TSH, 3rd generation; Future  -     Hemoglobin A1C; Future  2. Mixed hyperlipidemia  Comments:  continue same med  Orders:  -     rosuvastatin (CRESTOR) 5 mg tablet; Take 1 tablet (5 mg total) by mouth daily  -     Comprehensive metabolic panel; Future  -     Lipid Panel with Direct LDL reflex; Future  -     TSH, 3rd generation; Future  3. Diabetes mellitus due to underlying condition with diabetic nephropathy, without long-term current use of insulin ([facility])  4. 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[facility])  5. 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[facility])  6. Primary osteoarthritis of both knees  7. Hypertension, essential  Comments:  continue same med  Orders:  -     olmesartan (BENICAR) 40 mg tablet; Take 0.5 tablets (20 mg total) by mouth 2 (two) times a day  -     CBC and differential; Future  -     Comprehensive metabolic panel; Future  -     Lipid Panel with Direct LDL reflex; Future  -     TSH, 3rd generation; Future  8. B12 deficiency  -     Cyanocobalamin (B-12) 1000 MCG SUBL; Place 1 tablet (1,000 mcg total) under the tongue in the morning  -     Vitamin B12; Future         Subjective:      Patient ID: Rosmery Angulo is a 69 y.o. female.    Follow-up on blood and urine test done on 12/11/2024 test discussed with her        The following portions of the patient's history were reviewed and updated as appropriate: She  has a past medical history of Degenerative joint disease of knee, Depression with anxiety, Diabetes mellitus ([facility]), Diabetes mellitus due to underlying condition with diabetic nephropathy ([facility]), Dysthymic disorder, Esophageal reflux, Essential (primary) hypertension, Fibromyalgia,  Gastroesophageal reflux disease, Hypercholesterolemia, Hyperlipidemia, Hypertension, essential, Influenza vaccination declined, Obstructive sleep apnea (adult) (pediatric), Osteoporosis, 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HCC), Other specified disorders of white blood cells, Pneumococcal vaccination declined, Psoriatic arthropathy (HCC), Rheumatoid arthritis (HCC), 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HCC), Right knee pain, Sleep apnea, Tietze's disease, and Type II diabetes mellitus, uncontrolled.  She   Patient Active Problem List    Diagnosis Date Noted    DDD (degenerative disc disease), cervical 05/13/2024    Degenerative disc disease, lumbar 05/13/2024    Tracheitis 12/05/2022    Depression, recurrent (HCC) 07/18/2022    Otalgia 02/03/2022    Trigeminal neuralgia of left side of face 01/20/2022    Primary osteoarthritis of both knees 09/16/2021    Smoking 09/09/2021    Arthritis 09/09/2021    Body mass index 31.0-31.9, adult 01/14/2021    Pneumococcal vaccination declined     Influenza vaccination declined     Uncontrolled type 2 diabetes mellitus with hyperglycemia (HCC)     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HCC)     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HCC)     Obstructive sleep apnea syndrome     Hypertension, essential     Mixed hyperlipidemia     Gastroesophageal reflux disease     Dysthymic disorder     Osteoporosis     Diabetes mellitus due to underlying condition with diabetic nephropathy, without long-term current use of insulin (HCC)      She  has a past surgical history that includes Hysterectomy; Cholecystectomy; Colonoscopy (01/12/2012); and Mammo (historical) (02/04/2016).  Her family history includes Breast cancer in her cousin; Breast cancer (age of onset: 58) in her sister; Cancer in her sister; Diabetes in her mother; No Known Problems in her maternal aunt, maternal aunt, maternal aunt, maternal aunt, maternal aunt, maternal aunt, maternal grandfather, maternal grandmother, paternal  aunt, paternal grandfather, and paternal grandmother; Stroke in her father.  She  reports that she has been smoking cigarettes. She started smoking about 51 years ago. She has a 26 pack-year smoking history. She has never been exposed to tobacco smoke. She has quit using smokeless tobacco. She reports that she does not drink alcohol and does not use drugs.  Current Outpatient Medications   Medication Sig Dispense Refill    aspirin (ECOTRIN LOW STRENGTH) 81 mg EC tablet Take 81 mg by mouth daily      Cyanocobalamin (B-12) 1000 MCG SUBL Place 1 tablet (1,000 mcg total) under the tongue in the morning 100 tablet 1    metFORMIN (GLUCOPHAGE) 1000 MG tablet Take 1 tablet (1,000 mg total) by mouth 2 (two) times a day with meals 180 tablet 1    olmesartan (BENICAR) 40 mg tablet Take 0.5 tablets (20 mg total) by mouth 2 (two) times a day 90 tablet 2    rosuvastatin (CRESTOR) 5 mg tablet Take 1 tablet (5 mg total) by mouth daily 90 tablet 3    sitaGLIPtin (JANUVIA) 100 mg tablet Take 1 tablet (100 mg total) by mouth daily 90 tablet 1    tiZANidine (ZANAFLEX) 2 mg tablet Take 1 tablet (2 mg total) by mouth every 8 (eight) hours as needed for muscle spasms 30 tablet 0    Continuous Glucose Sensor (FreeStyle Remigio 3 Sensor) [facility] Use 1 each every 14 (fourteen) days (Patient not taking: Reported on 12/16/2024) 6 each 3    lidocaine (LIDODERM) 5 % Place 1 patch on the skin every 24 hours (Patient not taking: Reported on 12/16/2024)      naproxen (NAPROSYN) 500 mg tablet TAKE 1 TABLET BY MOUTH TWICE A DAY WITH MEALS (Patient not taking: Reported on 12/16/2024) 20 tablet 0     No current facility-administered medications for this visit.     Current Outpatient Medications on File Prior to Visit   Medication Sig    aspirin (ECOTRIN LOW STRENGTH) 81 mg EC tablet Take 81 mg by mouth daily    metFORMIN (GLUCOPHAGE) 1000 MG tablet Take 1 tablet (1,000 mg total) by mouth 2 (two) times a day with meals    sitaGLIPtin (JANUVIA) 100 mg tablet  Take 1 tablet (100 mg total) by mouth daily    tiZANidine (ZANAFLEX) 2 mg tablet Take 1 tablet (2 mg total) by mouth every 8 (eight) hours as needed for muscle spasms    [DISCONTINUED] olmesartan (BENICAR) 40 mg tablet Take 0.5 tablets (20 mg total) by mouth 2 (two) times a day    [DISCONTINUED] rosuvastatin (CRESTOR) 5 mg tablet Take 1 tablet (5 mg total) by mouth daily    Continuous Glucose Sensor (FreeStyle Remigio 3 Sensor) [facility] Use 1 each every 14 (fourteen) days (Patient not taking: Reported on 12/16/2024)    lidocaine (LIDODERM) 5 % Place 1 patch on the skin every 24 hours (Patient not taking: Reported on 12/16/2024)    naproxen (NAPROSYN) 500 mg tablet TAKE 1 TABLET BY MOUTH TWICE A DAY WITH MEALS (Patient not taking: Reported on 12/16/2024)     No current facility-administered medications on file prior to visit.     She is allergic to nuts - food allergy, atorvastatin, ezetimibe, latex, lisinopril, methotrexate, other, penicillins, pravastatin, and shellfish allergy - food allergy..    Review of Systems   Constitutional:  Negative for chills and fever.   HENT:  Negative for congestion, ear pain and sore throat.    Eyes:  Negative for pain.   Respiratory:  Negative for cough and shortness of breath.    Cardiovascular:  Negative for chest pain and leg swelling.   Gastrointestinal:  Negative for abdominal pain, nausea and vomiting.   Endocrine: Negative for polyuria.   Genitourinary:  Negative for difficulty urinating, frequency and urgency.   Musculoskeletal:  Positive for arthralgias and back pain.   Skin:  Negative for rash.   Neurological:  Negative for weakness and headaches.   Psychiatric/Behavioral:  Negative for sleep disturbance. The patient is not nervous/anxious.          Objective:      /80 (BP Location: Left arm, Patient Position: Sitting, Cuff Size: Standard)   Pulse 79   Temp 98.5 °F (36.9 °C) (Temporal)   Ht 5' (1.524 m)   Wt 70.8 kg (156 lb)   SpO2 97%   BMI 30.47 kg/m²     Recent  Results (from the past 8 weeks)   Albumin / creatinine urine ratio    Collection Time: 12/11/24  7:48 AM   Result Value Ref Range    Creatinine, Ur 259.6 Reference range not established. mg/dL    Albumin,U,Random 36.3 (H) <20.0 mg/L    Albumin Creat Ratio 14 0 - 30 mg/g creatinine   CBC and differential    Collection Time: 12/11/24  7:48 AM   Result Value Ref Range    WBC 5.83 4.31 - 10.16 Thousand/uL    RBC 4.98 3.81 - 5.12 Million/uL    Hemoglobin 15.4 11.5 - 15.4 g/dL    Hematocrit 46.6 (H) 34.8 - 46.1 %    MCV 94 82 - 98 fL    MCH 30.9 26.8 - 34.3 pg    MCHC 33.0 31.4 - 37.4 g/dL    RDW 12.6 11.6 - 15.1 %    MPV 11.5 8.9 - 12.7 fL    Platelets 161 149 - 390 Thousands/uL    nRBC 0 /100 WBCs    Segmented % 44 43 - 75 %    Immature Grans % 0 0 - 2 %    Lymphocytes % 43 14 - 44 %    Monocytes % 7 4 - 12 %    Eosinophils Relative 5 0 - 6 %    Basophils Relative 1 0 - 1 %    Absolute Neutrophils 2.54 1.85 - 7.62 Thousands/µL    Absolute Immature Grans 0.02 0.00 - 0.20 Thousand/uL    Absolute Lymphocytes 2.52 0.60 - 4.47 Thousands/µL    Absolute Monocytes 0.38 0.17 - 1.22 Thousand/µL    Eosinophils Absolute 0.31 0.00 - 0.61 Thousand/µL    Basophils Absolute 0.06 0.00 - 0.10 Thousands/µL   Comprehensive metabolic panel    Collection Time: 12/11/24  7:48 AM   Result Value Ref Range    Sodium 141 135 - 147 mmol/L    Potassium 3.9 3.5 - 5.3 mmol/L    Chloride 106 96 - 108 mmol/L    CO2 24 21 - 32 mmol/L    ANION GAP 11 4 - 13 mmol/L    BUN 21 5 - 25 mg/dL    Creatinine 0.81 0.60 - 1.30 mg/dL    Glucose, Fasting 150 (H) 65 - 99 mg/dL    Calcium 10.0 8.4 - 10.2 mg/dL    AST 14 13 - 39 U/L    ALT 18 7 - 52 U/L    Alkaline Phosphatase 47 34 - 104 U/L    Total Protein 6.9 6.4 - 8.4 g/dL    Albumin 4.3 3.5 - 5.0 g/dL    Total Bilirubin 0.44 0.20 - 1.00 mg/dL    eGFR 73 ml/min/1.73sq m   Lipid Panel with Direct LDL reflex    Collection Time: 12/11/24  7:48 AM   Result Value Ref Range    Cholesterol 151 See Comment mg/dL     Triglycerides 259 (H) See Comment mg/dL    HDL, Direct 31 (L) >=50 mg/dL    LDL Calculated 68 0 - 100 mg/dL   TSH, 3rd generation    Collection Time: 12/11/24  7:48 AM   Result Value Ref Range    TSH 3RD GENERATON 3.318 0.450 - 4.500 uIU/mL   Hemoglobin A1C    Collection Time: 12/11/24  7:48 AM   Result Value Ref Range    Hemoglobin A1C 7.4 (H) Normal 4.0-5.6%; PreDiabetic 5.7-6.4%; Diabetic >=6.5%; Glycemic control for adults with diabetes <7.0% %     mg/dl   Vitamin B12    Collection Time: 12/11/24  7:48 AM   Result Value Ref Range    Vitamin B-12 213 180 - 914 pg/mL        Physical Exam  Constitutional:       Appearance: Normal appearance.   HENT:      Head: Normocephalic.      Right Ear: Tympanic membrane, ear canal and external ear normal.      Left Ear: Tympanic membrane, ear canal and external ear normal.      Nose: Nose normal. No congestion.      Mouth/Throat:      Mouth: Mucous membranes are moist.      Pharynx: Oropharynx is clear. No oropharyngeal exudate or posterior oropharyngeal erythema.   Eyes:      Extraocular Movements: Extraocular movements intact.      Conjunctiva/sclera: Conjunctivae normal.   Cardiovascular:      Rate and Rhythm: Normal rate and regular rhythm.      Heart sounds: Normal heart sounds. No murmur heard.  Pulmonary:      Effort: Pulmonary effort is normal.      Breath sounds: Normal breath sounds. No wheezing or rales.   Abdominal:      General: Abdomen is flat. There is no distension.      Palpations: Abdomen is soft.      Tenderness: There is no abdominal tenderness.   Musculoskeletal:      Cervical back: Normal range of motion and neck supple.      Right lower leg: No edema.      Left lower leg: No edema.   Lymphadenopathy:      Cervical: No cervical adenopathy.   Skin:     General: Skin is warm.   Neurological:      General: No focal deficit present.      Mental Status: She is alert and oriented to person, place, and time.

## 2024-12-20 ENCOUNTER — VBI (OUTPATIENT)
Dept: ADMINISTRATIVE | Facility: OTHER | Age: 69
End: 2024-12-20

## 2024-12-21 NOTE — TELEPHONE ENCOUNTER
12/20/24 8:25 PM     Chart reviewed for Hemoglobin A1c was/were submitted to the patient's insurance.     Edna Hernandez   [facility]

## 2025-01-31 ENCOUNTER — RA CDI HCC (OUTPATIENT)
Dept: OTHER | Facility: HOSPITAL | Age: 70
End: 2025-01-31

## 2025-01-31 NOTE — PROGRESS NOTES
E11.65, L40.59  HCC coding opportunities          Chart Reviewed number of suggestions sent to Provider: 2     Patients Insurance     Medicare Insurance: [employer] Advantage

## 2025-02-04 ENCOUNTER — APPOINTMENT (OUTPATIENT)
Dept: LAB | Facility: CLINIC | Age: 70
End: 2025-02-04
Payer: COMMERCIAL

## 2025-02-04 DIAGNOSIS — E53.8 B12 DEFICIENCY: ICD-10-CM

## 2025-02-04 DIAGNOSIS — E11.65 UNCONTROLLED TYPE 2 DIABETES MELLITUS WITH HYPERGLYCEMIA (HCC): ICD-10-CM

## 2025-02-04 DIAGNOSIS — E78.2 MIXED HYPERLIPIDEMIA: ICD-10-CM

## 2025-02-04 DIAGNOSIS — I10 HYPERTENSION, ESSENTIAL: ICD-10-CM

## 2025-02-04 LAB
ALBUMIN SERPL BCG-MCNC: 4.6 G/DL (ref 3.5–5)
ALP SERPL-CCNC: 57 U/L (ref 34–104)
ALT SERPL W P-5'-P-CCNC: 16 U/L (ref 7–52)
ANION GAP SERPL CALCULATED.3IONS-SCNC: 11 MMOL/L (ref 4–13)
AST SERPL W P-5'-P-CCNC: 14 U/L (ref 13–39)
BASOPHILS # BLD AUTO: 0.04 THOUSANDS/ΜL (ref 0–0.1)
BASOPHILS NFR BLD AUTO: 1 % (ref 0–1)
BILIRUB SERPL-MCNC: 0.44 MG/DL (ref 0.2–1)
BUN SERPL-MCNC: 19 MG/DL (ref 5–25)
CALCIUM SERPL-MCNC: 10.4 MG/DL (ref 8.4–10.2)
CHLORIDE SERPL-SCNC: 105 MMOL/L (ref 96–108)
CHOLEST SERPL-MCNC: 147 MG/DL (ref ?–200)
CO2 SERPL-SCNC: 27 MMOL/L (ref 21–32)
CREAT SERPL-MCNC: 0.68 MG/DL (ref 0.6–1.3)
CREAT UR-MCNC: 183.8 MG/DL
EOSINOPHIL # BLD AUTO: 0.19 THOUSAND/ΜL (ref 0–0.61)
EOSINOPHIL NFR BLD AUTO: 3 % (ref 0–6)
ERYTHROCYTE [DISTWIDTH] IN BLOOD BY AUTOMATED COUNT: 13.1 % (ref 11.6–15.1)
EST. AVERAGE GLUCOSE BLD GHB EST-MCNC: 166 MG/DL
GFR SERPL CREATININE-BSD FRML MDRD: 88 ML/MIN/1.73SQ M
GLUCOSE P FAST SERPL-MCNC: 129 MG/DL (ref 65–99)
HBA1C MFR BLD: 7.4 %
HCT VFR BLD AUTO: 46.8 % (ref 34.8–46.1)
HDLC SERPL-MCNC: 34 MG/DL
HGB BLD-MCNC: 15.5 G/DL (ref 11.5–15.4)
IMM GRANULOCYTES # BLD AUTO: 0.01 THOUSAND/UL (ref 0–0.2)
IMM GRANULOCYTES NFR BLD AUTO: 0 % (ref 0–2)
LDLC SERPL CALC-MCNC: 71 MG/DL (ref 0–100)
LYMPHOCYTES # BLD AUTO: 2.94 THOUSANDS/ΜL (ref 0.6–4.47)
LYMPHOCYTES NFR BLD AUTO: 44 % (ref 14–44)
MCH RBC QN AUTO: 30.6 PG (ref 26.8–34.3)
MCHC RBC AUTO-ENTMCNC: 33.1 G/DL (ref 31.4–37.4)
MCV RBC AUTO: 93 FL (ref 82–98)
MICROALBUMIN UR-MCNC: 13.2 MG/L
MICROALBUMIN/CREAT 24H UR: 7 MG/G CREATININE (ref 0–30)
MONOCYTES # BLD AUTO: 0.52 THOUSAND/ΜL (ref 0.17–1.22)
MONOCYTES NFR BLD AUTO: 8 % (ref 4–12)
NEUTROPHILS # BLD AUTO: 3.04 THOUSANDS/ΜL (ref 1.85–7.62)
NEUTS SEG NFR BLD AUTO: 44 % (ref 43–75)
NRBC BLD AUTO-RTO: 0 /100 WBCS
PLATELET # BLD AUTO: 192 THOUSANDS/UL (ref 149–390)
PMV BLD AUTO: 11.1 FL (ref 8.9–12.7)
POTASSIUM SERPL-SCNC: 4.4 MMOL/L (ref 3.5–5.3)
PROT SERPL-MCNC: 7.6 G/DL (ref 6.4–8.4)
RBC # BLD AUTO: 5.06 MILLION/UL (ref 3.81–5.12)
SODIUM SERPL-SCNC: 143 MMOL/L (ref 135–147)
TRIGL SERPL-MCNC: 211 MG/DL (ref ?–150)
TSH SERPL DL<=0.05 MIU/L-ACNC: 2.32 UIU/ML (ref 0.45–4.5)
VIT B12 SERPL-MCNC: 3311 PG/ML (ref 180–914)
WBC # BLD AUTO: 6.74 THOUSAND/UL (ref 4.31–10.16)

## 2025-02-04 PROCEDURE — 82607 VITAMIN B-12: CPT

## 2025-02-04 PROCEDURE — 82570 ASSAY OF URINE CREATININE: CPT

## 2025-02-04 PROCEDURE — 80053 COMPREHEN METABOLIC PANEL: CPT

## 2025-02-04 PROCEDURE — 36415 COLL VENOUS BLD VENIPUNCTURE: CPT

## 2025-02-04 PROCEDURE — 85025 COMPLETE CBC W/AUTO DIFF WBC: CPT

## 2025-02-04 PROCEDURE — 83036 HEMOGLOBIN GLYCOSYLATED A1C: CPT

## 2025-02-04 PROCEDURE — 82043 UR ALBUMIN QUANTITATIVE: CPT

## 2025-02-04 PROCEDURE — 84443 ASSAY THYROID STIM HORMONE: CPT

## 2025-02-04 PROCEDURE — 80061 LIPID PANEL: CPT

## 2025-02-10 ENCOUNTER — OFFICE VISIT (OUTPATIENT)
Dept: INTERNAL MEDICINE CLINIC | Facility: CLINIC | Age: 70
End: 2025-02-10
Payer: COMMERCIAL

## 2025-02-10 VITALS
WEIGHT: 157 LBS | HEIGHT: 60 IN | SYSTOLIC BLOOD PRESSURE: 132 MMHG | OXYGEN SATURATION: 97 % | BODY MASS INDEX: 30.82 KG/M2 | TEMPERATURE: 98.2 F | HEART RATE: 74 BPM | DIASTOLIC BLOOD PRESSURE: 80 MMHG

## 2025-02-10 DIAGNOSIS — M17.0 PRIMARY OSTEOARTHRITIS OF BOTH KNEES: ICD-10-CM

## 2025-02-10 DIAGNOSIS — E11.65 UNCONTROLLED TYPE 2 DIABETES MELLITUS WITH HYPERGLYCEMIA (HCC): ICD-10-CM

## 2025-02-10 DIAGNOSIS — M51.360 DEGENERATION OF INTERVERTEBRAL DISC OF LUMBAR REGION WITH DISCOGENIC BACK PAIN: ICD-10-CM

## 2025-02-10 DIAGNOSIS — I10 HYPERTENSION, ESSENTIAL: Primary | ICD-10-CM

## 2025-02-10 DIAGNOSIS — M05.40 RHEUMATOID MYOPATHY WITH RHEUMATOID ARTHRITIS (HCC): ICD-10-CM

## 2025-02-10 DIAGNOSIS — E78.2 MIXED HYPERLIPIDEMIA: ICD-10-CM

## 2025-02-10 DIAGNOSIS — Z00.00 MEDICARE ANNUAL WELLNESS VISIT, SUBSEQUENT: ICD-10-CM

## 2025-02-10 DIAGNOSIS — E08.21 DIABETES MELLITUS DUE TO UNDERLYING CONDITION WITH DIABETIC NEPHROPATHY, WITHOUT LONG-TERM CURRENT USE OF INSULIN (HCC): ICD-10-CM

## 2025-02-10 PROCEDURE — 99214 OFFICE O/P EST MOD 30 MIN: CPT | Performed by: INTERNAL MEDICINE

## 2025-02-10 PROCEDURE — G0439 PPPS, SUBSEQ VISIT: HCPCS | Performed by: INTERNAL MEDICINE

## 2025-02-10 RX ORDER — OLMESARTAN MEDOXOMIL 40 MG/1
20 TABLET ORAL 2 TIMES DAILY
Qty: 90 TABLET | Refills: 2 | Status: SHIPPED | OUTPATIENT
Start: 2025-02-10

## 2025-02-10 RX ORDER — ROSUVASTATIN CALCIUM 5 MG/1
5 TABLET, COATED ORAL DAILY
Qty: 90 TABLET | Refills: 3 | Status: SHIPPED | OUTPATIENT
Start: 2025-02-10

## 2025-02-10 NOTE — PROGRESS NOTES
Name: Rosmery Angulo      : 1955      MRN: [id number]  Encounter Provider: Yves Ramirez MD  Encounter Date: 2/10/2025   Encounter department: [facility] INTERNAL MEDICINE [facility]    Assessment & Plan  Hypertension, essential    Orders:    olmesartan (BENICAR) 40 mg tablet; Take 0.5 tablets (20 mg total) by mouth 2 (two) times a day    Uncontrolled type 2 diabetes mellitus with hyperglycemia (HCC)    Lab Results   Component Value Date    HGBA1C 7.4 (H) 2025       Orders:    sitaGLIPtin (JANUVIA) 100 mg tablet; Take 1 tablet (100 mg total) by mouth daily    metFORMIN (GLUCOPHAGE) 1000 MG tablet; Take 1 tablet (1,000 mg total) by mouth 2 (two) times a day with meals    Primary osteoarthritis of both knees         Mixed hyperlipidemia    Orders:    rosuvastatin (CRESTOR) 5 mg tablet; Take 1 tablet (5 mg total) by mouth daily    Degeneration of intervertebral disc of lumbar region with discogenic back pain         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HCC)         Diabetes mellitus due to underlying condition with diabetic nephropathy, without long-term current use of insulin (HCC)    Lab Results   Component Value Date    HGBA1C 7.4 (H) 2025            Medicare annual wellness visit, subsequent           Depression Screening and Follow-up Plan: Patient was screened for depression during today's encounter. They screened negative with a PHQ-9 score of 2.    Tobacco Cessation Counseling: Tobacco cessation counseling was provided. The patient is sincerely urged to quit consumption of tobacco. She is ready to quit tobacco.       Preventive health issues were discussed with patient, and age appropriate screening tests were ordered as noted in patient's After Visit Summary. Personalized health advice and appropriate referrals for health education or preventive services given if needed, as noted in patient's After Visit Summary.    History of Present Illness     Follow-up on blood  and urine test done on 2/4/2025 test discussed with her, also medical wellness exam       Patient Care Team:  Yves Ramirez MD as PCP - General (Internal Medicine)  Yves Ramirez MD as PCP - [facility] ([facility])    Review of Systems   Constitutional:  Negative for chills and fever.   HENT:  Negative for congestion, ear pain and sore throat.    Eyes:  Negative for pain.   Respiratory:  Negative for cough and shortness of breath.    Cardiovascular:  Negative for chest pain and leg swelling.   Gastrointestinal:  Negative for abdominal pain, nausea and vomiting.   Endocrine: Negative for polyuria.   Genitourinary:  Negative for difficulty urinating, frequency and urgency.   Musculoskeletal:  Positive for arthralgias. Negative for back pain.   Skin:  Negative for rash.   Neurological:  Negative for weakness and headaches.   Psychiatric/Behavioral:  Negative for sleep disturbance. The patient is not nervous/anxious.      Medical History Reviewed by provider this encounter:  Tobacco  Allergies  Meds  Problems  Med Hx  Surg Hx  Fam Hx       Annual Wellness Visit Questionnaire   Rosmery is here for her Subsequent Wellness visit. Last Medicare Wellness visit information reviewed, patient interviewed and updates made to the record today.      Health Risk Assessment:   Patient feels that their physical health rating is slightly worse. Patient is satisfied with their life. Eyesight was rated as slightly worse. Hearing was rated as same. Patient feels that their emotional and mental health rating is slightly worse. Patients states they are never, rarely angry. Patient states they are always unusually tired/fatigued. Pain experienced in the last 7 days has been a lot. Patient's pain rating has been 8/10. Patient states that she has experienced no weight loss or gain in last 6 months.     Depression Screening:   PHQ-9 Score: 2      Fall Risk Screening:   In the past year, patient has experienced: no history of falling  in past year      Urinary Incontinence Screening:   Patient has not leaked urine accidently in the last six months.     Home Safety:  Patient has trouble with stairs inside or outside of their home. Patient has working smoke alarms and has working carbon monoxide detector. Home safety hazards include: none.     Nutrition:   Current diet is Regular.     Medications:   Patient is not currently taking any over-the-counter supplements. Patient is able to manage medications.     Activities of Daily Living (ADLs)/Instrumental Activities of Daily Living (IADLs):   Walk and transfer into and out of bed and chair?: Yes  Dress and groom yourself?: Yes    Bathe or shower yourself?: Yes    Feed yourself? Yes  Do your laundry/housekeeping?: Yes  Manage your money, pay your bills and track your expenses?: Yes  Make your own meals?: Yes    Do your own shopping?: Yes    Previous Hospitalizations:   Any hospitalizations or ED visits within the last 12 months?: No      Advance Care Planning:   Living will: No    Durable POA for healthcare: No    Advanced directive: No    ACP document given: Yes      Cognitive Screening:   Provider or family/friend/caregiver concerned regarding cognition?: No    PREVENTIVE SCREENINGS      Cardiovascular Screening:    General: Screening Not Indicated and History Lipid Disorder      Diabetes Screening:     General: Screening Not Indicated and History Diabetes      Colorectal Cancer Screening:     General: Screening Current      Breast Cancer Screening:     General: Screening Current      Cervical Cancer Screening:    General: Screening Not Indicated      Osteoporosis Screening:    General: Screening Not Indicated and History Osteoporosis      Hepatitis C Screening:    General: Screening Current    Screening, Brief Intervention, and Referral to Treatment (SBIRT)    Screening  Typical number of drinks in a day: 0  Typical number of drinks in a week: 0  Interpretation: Low risk drinking behavior.    Single  Item Drug Screening:  How often have you used an illegal drug (including marijuana) or a prescription medication for non-medical reasons in the past year? never    Single Item Drug Screen Score: 0  Interpretation: Negative screen for possible drug use disorder    Social Drivers of Health     Financial Resource Strain: Low Risk  (2/8/2024)    Overall Financial Resource Strain (CARDIA)     Difficulty of Paying Living Expenses: Not hard at all   Food Insecurity: No Food Insecurity (2/10/2025)    Hunger Vital Sign     Worried About Running Out of Food in the Last Year: Never true     Ran Out of Food in the Last Year: Never true   Transportation Needs: No Transportation Needs (2/10/2025)    PRAPARE - Transportation     Lack of Transportation (Medical): No     Lack of Transportation (Non-Medical): No   Housing Stability: Low Risk  (2/10/2025)    Housing Stability Vital Sign     Unable to Pay for Housing in the Last Year: No     Number of Times Moved in the Last Year: 0     Homeless in the Last Year: No   Utilities: Not At Risk (2/10/2025)    [place] Utilities     Threatened with loss of utilities: No     No results found.    Objective   /80 (BP Location: Left arm, Patient Position: Sitting, Cuff Size: Standard)   Pulse 74   Temp 98.2 °F (36.8 °C) (Temporal)   Ht 5' (1.524 m)   Wt 71.2 kg (157 lb)   SpO2 97%   BMI 30.66 kg/m²     Physical Exam  Vitals and nursing note reviewed.   Constitutional:       General: She is not in acute distress.     Appearance: She is well-developed.   HENT:      Head: Normocephalic and atraumatic.      Right Ear: Tympanic membrane, ear canal and external ear normal.      Left Ear: Tympanic membrane, ear canal and external ear normal.      Mouth/Throat:      Mouth: Mucous membranes are moist.      Pharynx: Oropharynx is clear.   Eyes:      Extraocular Movements: Extraocular movements intact.      Conjunctiva/sclera: Conjunctivae normal.   Cardiovascular:      Rate and Rhythm: Normal rate  and regular rhythm.      Heart sounds: Normal heart sounds. No murmur heard.  Pulmonary:      Effort: Pulmonary effort is normal. No respiratory distress.      Breath sounds: Normal breath sounds. No wheezing or rales.   Abdominal:      General: Abdomen is flat. There is no distension.      Palpations: Abdomen is soft.      Tenderness: There is no abdominal tenderness.   Musculoskeletal:         General: No swelling.      Cervical back: Neck supple.      Right lower leg: No edema.      Left lower leg: No edema.   Skin:     General: Skin is warm and dry.      Capillary Refill: Capillary refill takes less than 2 seconds.   Neurological:      General: No focal deficit present.      Mental Status: She is alert and oriented to person, place, and time.   Psychiatric:         Mood and Affect: Mood normal.

## 2025-02-10 NOTE — ASSESSMENT & PLAN NOTE
Lab Results   Component Value Date    HGBA1C 7.4 (H) 02/04/2025       Orders:    sitaGLIPtin (JANUVIA) 100 mg tablet; Take 1 tablet (100 mg total) by mouth daily    metFORMIN (GLUCOPHAGE) 1000 MG tablet; Take 1 tablet (1,000 mg total) by mouth 2 (two) times a day with meals

## 2025-02-10 NOTE — ASSESSMENT & PLAN NOTE
Orders:    olmesartan (BENICAR) 40 mg tablet; Take 0.5 tablets (20 mg total) by mouth 2 (two) times a day

## 2025-03-05 ENCOUNTER — OFFICE VISIT (OUTPATIENT)
Dept: INTERNAL MEDICINE CLINIC | Facility: CLINIC | Age: 70
End: 2025-03-05
Payer: COMMERCIAL

## 2025-03-05 VITALS
BODY MASS INDEX: 30.82 KG/M2 | WEIGHT: 157 LBS | OXYGEN SATURATION: 96 % | SYSTOLIC BLOOD PRESSURE: 130 MMHG | HEIGHT: 60 IN | TEMPERATURE: 97.5 F | DIASTOLIC BLOOD PRESSURE: 78 MMHG | HEART RATE: 87 BPM

## 2025-03-05 DIAGNOSIS — J11.1 INFLUENZA: Primary | ICD-10-CM

## 2025-03-05 DIAGNOSIS — R52 BODY ACHES: ICD-10-CM

## 2025-03-05 LAB
SARS-COV-2 AG UPPER RESP QL IA: NEGATIVE
SL AMB POCT RAPID FLU A: NORMAL
SL AMB POCT RAPID FLU A: NORMAL
SL AMB POCT RAPID FLU B: NORMAL
SL AMB POCT RAPID FLU B: NORMAL
VALID CONTROL: NORMAL

## 2025-03-05 PROCEDURE — 87811 SARS-COV-2 COVID19 W/OPTIC: CPT | Performed by: INTERNAL MEDICINE

## 2025-03-05 PROCEDURE — 87804 INFLUENZA ASSAY W/OPTIC: CPT | Performed by: INTERNAL MEDICINE

## 2025-03-05 PROCEDURE — 99213 OFFICE O/P EST LOW 20 MIN: CPT | Performed by: INTERNAL MEDICINE

## 2025-03-05 PROCEDURE — G2211 COMPLEX E/M VISIT ADD ON: HCPCS | Performed by: INTERNAL MEDICINE

## 2025-03-05 RX ORDER — BENZONATATE 200 MG/1
200 CAPSULE ORAL 3 TIMES DAILY PRN
Qty: 20 CAPSULE | Refills: 0 | Status: SHIPPED | OUTPATIENT
Start: 2025-03-05

## 2025-03-05 RX ORDER — OSELTAMIVIR PHOSPHATE 75 MG/1
75 CAPSULE ORAL EVERY 12 HOURS SCHEDULED
Qty: 10 CAPSULE | Refills: 0 | Status: SHIPPED | OUTPATIENT
Start: 2025-03-05 | End: 2025-03-10

## 2025-03-05 NOTE — PROGRESS NOTES
Assessment/Plan:             1. Influenza  -     oseltamivir (TAMIFLU) 75 mg capsule; Take 1 capsule (75 mg total) by mouth every 12 (twelve) hours for 5 days  -     benzonatate (TESSALON) 200 MG capsule; Take 1 capsule (200 mg total) by mouth 3 (three) times a day as needed for cough  2. Body aches  -     POCT rapid flu A and B  -     POCT Rapid Covid Ag  -     POCT rapid flu A and B         Subjective:      Patient ID: Rosmery Angulo is a 69 y.o. female.    Cough, sick since Monday, had a fever 102.3 yesterday, whole body aches,        The following portions of the patient's history were reviewed and updated as appropriate: She  has a past medical history of Degenerative joint disease of knee, Depression with anxiety, Diabetes mellitus (HCC), Diabetes mellitus due to underlying condition with diabetic nephropathy (HCC), Dysthymic disorder, Esophageal reflux, Essential (primary) hypertension, Fibromyalgia, Gastroesophageal reflux disease, Hypercholesterolemia, Hyperlipidemia, Hypertension, essential, Influenza vaccination declined, Obstructive sleep apnea (adult) (pediatric), Osteoporosis, 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HCC), Other specified disorders of white blood cells, Pneumococcal vaccination declined, Psoriatic arthropathy (HCC), Rheumatoid arthritis (HCC), 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HCC), Right knee pain, Sleep apnea, Tietze's disease, and Type II diabetes mellitus, uncontrolled.  She   Patient Active Problem List    Diagnosis Date Noted    DDD (degenerative disc disease), cervical 05/13/2024    Degenerative disc disease, lumbar 05/13/2024    Tracheitis 12/05/2022    Depression, recurrent (HCC) 07/18/2022    Otalgia 02/03/2022    Trigeminal neuralgia of left side of face 01/20/2022    Primary osteoarthritis of both knees 09/16/2021    Smoking 09/09/2021    Arthritis 09/09/2021    Body mass index 31.0-31.9, adult 01/14/2021    Pneumococcal vaccination declined     Influenza vaccination  declined     Uncontrolled type 2 diabetes mellitus with hyperglycemia (HCC)     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HCC)     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HCC)     Obstructive sleep apnea syndrome     Hypertension, essential     Mixed hyperlipidemia     Gastroesophageal reflux disease     Dysthymic disorder     Osteoporosis     Diabetes mellitus due to underlying condition with diabetic nephropathy, without long-term current use of insulin (HCC)      She  has a past surgical history that includes Hysterectomy; Cholecystectomy; Colonoscopy (01/12/2012); and Mammo (historical) (02/04/2016).  Her family history includes Breast cancer in her cousin; Breast cancer (age of onset: 58) in her sister; Cancer in her sister; Diabetes in her mother; No Known Problems in her maternal aunt, maternal aunt, maternal aunt, maternal aunt, maternal aunt, maternal aunt, maternal grandfather, maternal grandmother, paternal aunt, paternal grandfather, and paternal grandmother; Stroke in her father.  She  reports that she has been smoking cigarettes. She started smoking about 52 years ago. She has a 26.1 pack-year smoking history. She has never been exposed to tobacco smoke. She has quit using smokeless tobacco. She reports that she does not drink alcohol and does not use drugs.  Current Outpatient Medications   Medication Sig Dispense Refill    aspirin (ECOTRIN LOW STRENGTH) 81 mg EC tablet Take 81 mg by mouth daily      benzonatate (TESSALON) 200 MG capsule Take 1 capsule (200 mg total) by mouth 3 (three) times a day as needed for cough 20 capsule 0    Cyanocobalamin (B-12) 1000 MCG SUBL Place 1 tablet (1,000 mcg total) under the tongue in the morning 100 tablet 1    metFORMIN (GLUCOPHAGE) 1000 MG tablet Take 1 tablet (1,000 mg total) by mouth 2 (two) times a day with meals 180 tablet 1    olmesartan (BENICAR) 40 mg tablet Take 0.5 tablets (20 mg total) by mouth 2 (two) times a day 90 tablet 2    oseltamivir (TAMIFLU) 75 mg  capsule Take 1 capsule (75 mg total) by mouth every 12 (twelve) hours for 5 days 10 capsule 0    rosuvastatin (CRESTOR) 5 mg tablet Take 1 tablet (5 mg total) by mouth daily 90 tablet 3    sitaGLIPtin (JANUVIA) 100 mg tablet Take 1 tablet (100 mg total) by mouth daily 90 tablet 1    tiZANidine (ZANAFLEX) 2 mg tablet Take 1 tablet (2 mg total) by mouth every 8 (eight) hours as needed for muscle spasms 30 tablet 0    Continuous Glucose Sensor (FreeStyle Remigio 3 Sensor) MISC Use 1 each every 14 (fourteen) days (Patient not taking: Reported on 12/16/2024) 6 each 3    lidocaine (LIDODERM) 5 % Place 1 patch on the skin every 24 hours (Patient not taking: Reported on 12/16/2024)      naproxen (NAPROSYN) 500 mg tablet TAKE 1 TABLET BY MOUTH TWICE A DAY WITH MEALS (Patient not taking: Reported on 12/16/2024) 20 tablet 0     No current facility-administered medications for this visit.     Current Outpatient Medications on File Prior to Visit   Medication Sig    aspirin (ECOTRIN LOW STRENGTH) 81 mg EC tablet Take 81 mg by mouth daily    Cyanocobalamin (B-12) 1000 MCG SUBL Place 1 tablet (1,000 mcg total) under the tongue in the morning    metFORMIN (GLUCOPHAGE) 1000 MG tablet Take 1 tablet (1,000 mg total) by mouth 2 (two) times a day with meals    olmesartan (BENICAR) 40 mg tablet Take 0.5 tablets (20 mg total) by mouth 2 (two) times a day    rosuvastatin (CRESTOR) 5 mg tablet Take 1 tablet (5 mg total) by mouth daily    sitaGLIPtin (JANUVIA) 100 mg tablet Take 1 tablet (100 mg total) by mouth daily    tiZANidine (ZANAFLEX) 2 mg tablet Take 1 tablet (2 mg total) by mouth every 8 (eight) hours as needed for muscle spasms    Continuous Glucose Sensor (FreeStyle Remigio 3 Sensor) MISC Use 1 each every 14 (fourteen) days (Patient not taking: Reported on 12/16/2024)    lidocaine (LIDODERM) 5 % Place 1 patch on the skin every 24 hours (Patient not taking: Reported on 12/16/2024)    naproxen (NAPROSYN) 500 mg tablet TAKE 1 TABLET BY  MOUTH TWICE A DAY WITH MEALS (Patient not taking: Reported on 12/16/2024)     No current facility-administered medications on file prior to visit.     She is allergic to nuts - food allergy, atorvastatin, ezetimibe, latex, lisinopril, methotrexate, other, penicillins, pravastatin, and shellfish allergy - food allergy..    Review of Systems   Constitutional:  Positive for fatigue and fever. Negative for chills.   HENT:  Negative for congestion, ear pain and sore throat.    Eyes:  Negative for pain.   Respiratory:  Positive for cough. Negative for shortness of breath.    Cardiovascular:  Negative for chest pain and leg swelling.   Gastrointestinal:  Negative for abdominal pain, nausea and vomiting.   Endocrine: Negative for polyuria.   Genitourinary:  Negative for difficulty urinating, frequency and urgency.   Musculoskeletal:  Negative for arthralgias and back pain.   Skin:  Negative for rash.   Neurological:  Negative for weakness and headaches.   Psychiatric/Behavioral:  Negative for sleep disturbance. The patient is not nervous/anxious.          Objective:      /78 (BP Location: Left arm, Patient Position: Sitting, Cuff Size: Standard)   Pulse 87   Temp 97.5 °F (36.4 °C) (Temporal)   Ht 5' (1.524 m)   Wt 71.2 kg (157 lb)   SpO2 96%   BMI 30.66 kg/m²     Recent Results (from the past 8 weeks)   CBC and differential    Collection Time: 02/04/25  8:15 AM   Result Value Ref Range    WBC 6.74 4.31 - 10.16 Thousand/uL    RBC 5.06 3.81 - 5.12 Million/uL    Hemoglobin 15.5 (H) 11.5 - 15.4 g/dL    Hematocrit 46.8 (H) 34.8 - 46.1 %    MCV 93 82 - 98 fL    MCH 30.6 26.8 - 34.3 pg    MCHC 33.1 31.4 - 37.4 g/dL    RDW 13.1 11.6 - 15.1 %    MPV 11.1 8.9 - 12.7 fL    Platelets 192 149 - 390 Thousands/uL    nRBC 0 /100 WBCs    Segmented % 44 43 - 75 %    Immature Grans % 0 0 - 2 %    Lymphocytes % 44 14 - 44 %    Monocytes % 8 4 - 12 %    Eosinophils Relative 3 0 - 6 %    Basophils Relative 1 0 - 1 %    Absolute  Neutrophils 3.04 1.85 - 7.62 Thousands/µL    Absolute Immature Grans 0.01 0.00 - 0.20 Thousand/uL    Absolute Lymphocytes 2.94 0.60 - 4.47 Thousands/µL    Absolute Monocytes 0.52 0.17 - 1.22 Thousand/µL    Eosinophils Absolute 0.19 0.00 - 0.61 Thousand/µL    Basophils Absolute 0.04 0.00 - 0.10 Thousands/µL   Comprehensive metabolic panel    Collection Time: 02/04/25  8:15 AM   Result Value Ref Range    Sodium 143 135 - 147 mmol/L    Potassium 4.4 3.5 - 5.3 mmol/L    Chloride 105 96 - 108 mmol/L    CO2 27 21 - 32 mmol/L    ANION GAP 11 4 - 13 mmol/L    BUN 19 5 - 25 mg/dL    Creatinine 0.68 0.60 - 1.30 mg/dL    Glucose, Fasting 129 (H) 65 - 99 mg/dL    Calcium 10.4 (H) 8.4 - 10.2 mg/dL    AST 14 13 - 39 U/L    ALT 16 7 - 52 U/L    Alkaline Phosphatase 57 34 - 104 U/L    Total Protein 7.6 6.4 - 8.4 g/dL    Albumin 4.6 3.5 - 5.0 g/dL    Total Bilirubin 0.44 0.20 - 1.00 mg/dL    eGFR 88 ml/min/1.73sq m   Albumin / creatinine urine ratio    Collection Time: 02/04/25  8:15 AM   Result Value Ref Range    Creatinine, Ur 183.8 Reference range not established. mg/dL    Albumin,U,Random 13.2 <20.0 mg/L    Albumin Creat Ratio 7 0 - 30 mg/g creatinine   Lipid Panel with Direct LDL reflex    Collection Time: 02/04/25  8:15 AM   Result Value Ref Range    Cholesterol 147 See Comment mg/dL    Triglycerides 211 (H) See Comment mg/dL    HDL, Direct 34 (L) >=50 mg/dL    LDL Calculated 71 0 - 100 mg/dL   TSH, 3rd generation    Collection Time: 02/04/25  8:15 AM   Result Value Ref Range    TSH 3RD GENERATON 2.322 0.450 - 4.500 uIU/mL   Vitamin B12    Collection Time: 02/04/25  8:15 AM   Result Value Ref Range    Vitamin B-12 3,311 (H) 180 - 914 pg/mL   Hemoglobin A1C    Collection Time: 02/04/25  8:15 AM   Result Value Ref Range    Hemoglobin A1C 7.4 (H) Normal 4.0-5.6%; PreDiabetic 5.7-6.4%; Diabetic >=6.5%; Glycemic control for adults with diabetes <7.0% %     mg/dl   POCT rapid flu A and B    Collection Time: 03/05/25 11:41 AM    Result Value Ref Range    RAPID FLU A neg     RAPID FLU B neg    POCT Rapid Covid Ag    Collection Time: 03/05/25 11:41 AM   Result Value Ref Range    POCT SARS-CoV-2 Ag Negative Negative    VALID CONTROL Valid    POCT rapid flu A and B    Collection Time: 03/05/25 12:05 PM   Result Value Ref Range    RAPID FLU A neg     RAPID FLU B neg         Physical Exam  Constitutional:       Appearance: Normal appearance.   HENT:      Head: Normocephalic.      Right Ear: Tympanic membrane, ear canal and external ear normal.      Left Ear: Tympanic membrane, ear canal and external ear normal.      Nose: Nose normal. No congestion.      Mouth/Throat:      Mouth: Mucous membranes are moist.      Pharynx: Oropharynx is clear. No oropharyngeal exudate or posterior oropharyngeal erythema.   Eyes:      Extraocular Movements: Extraocular movements intact.      Conjunctiva/sclera: Conjunctivae normal.   Cardiovascular:      Rate and Rhythm: Normal rate and regular rhythm.      Heart sounds: Normal heart sounds. No murmur heard.  Pulmonary:      Effort: Pulmonary effort is normal.      Breath sounds: Normal breath sounds. No wheezing or rales.   Abdominal:      General: Abdomen is flat. There is no distension.      Palpations: Abdomen is soft.      Tenderness: There is no abdominal tenderness.   Musculoskeletal:         General: Normal range of motion.      Cervical back: Normal range of motion and neck supple.      Right lower leg: No edema.      Left lower leg: No edema.   Lymphadenopathy:      Cervical: No cervical adenopathy.   Skin:     General: Skin is warm.   Neurological:      General: No focal deficit present.      Mental Status: She is alert and oriented to person, place, and time.

## 2025-04-02 ENCOUNTER — OFFICE VISIT (OUTPATIENT)
Age: 70
End: 2025-04-02
Payer: COMMERCIAL

## 2025-04-02 DIAGNOSIS — H25.13 NUCLEAR SCLEROSIS OF BOTH EYES: ICD-10-CM

## 2025-04-02 DIAGNOSIS — H43.823 VITREOMACULAR ADHESION OF BOTH EYES: Primary | ICD-10-CM

## 2025-04-02 DIAGNOSIS — E11.9 TYPE 2 DIABETES MELLITUS WITHOUT RETINOPATHY (HCC): ICD-10-CM

## 2025-04-02 PROCEDURE — 92134 CPTRZ OPH DX IMG PST SGM RTA: CPT | Performed by: OPHTHALMOLOGY

## 2025-04-02 PROCEDURE — 92004 COMPRE OPH EXAM NEW PT 1/>: CPT | Performed by: OPHTHALMOLOGY

## 2025-04-02 NOTE — PROGRESS NOTES
"Name: Rosmery Angulo      : 1955      MRN: [id number]  Encounter Provider: Cecelia Turk MD  Encounter Date: 2025   Encounter department: [facility] OPHTHALMOLOGY  :  Assessment & Plan  Vitreomacular adhesion of both eyes  Pt has syneresis; No retinal tears, breaks, or detachments on dilated examination; Recommend monitoring; Retinal detachment precautions were discussed with the patient. The patient was instructed to call or return immediately for an increase in flashes of light, floaters (dark spots in vision), or curtaining of the visual field.     Orders:    OCT, Retina - OU - Both Eyes    Nuclear sclerosis of both eyes  Not visually significant at this time. Continue to monitor; Pt denies any difficulty with glare or decrease in vision.         Type 2 diabetes mellitus without retinopathy (HCC)    Lab Results   Component Value Date    HGBA1C 7.4 (H) 2025     No diabetic retinopathy on exam, no macular edema, no NV. The importance of proper blood sugar, blood lipids, and blood pressure control for minimizing the risk of vision loss due to retinopathy associated with diabetes mellitus and hypertension was discussed with the patient. Stressed the importance of following up for monitoring and management by a primary care physician.            Rosmery Angulo is a 69 y.o. female who presents for a diabetic eye exam.    T2DM x 20+ years.  Reports BS is relatively controlled now, about a year ago was very high \"in the 600s\" but with medication is better.  A1c: 7.4.    She reports occasionally 'black spots like little mosquitos\" in vision, but denies flashes.  She mentions she needs a retinal exam before she can get glasses.    Eyes will occasional burn and tear.  She notes she has a \"membrane from the sun\" on her right eye that occasionally itches.\    History obtained from: patient    Review of Systems  Medical History Reviewed by provider this encounter:  Tobacco  Allergies  Meds  Problems "  Med Hx  Surg Hx  Fam Hx     .     Past Ocular History: refr err   Ocular Meds/Drops: none    Base Eye Exam       Visual Acuity (Snellen - Linear)         Right Left    Dist cc 20/20 20/20-      Correction: Glasses              Tonometry (Tonopen, 1:44 PM)         Right Left    Pressure 16 15              Pupils         Pupils    Right PERRL    Left PERRL              Visual Fields         Left Right     Full Full              Extraocular Movement         Right Left     Full Full              Neuro/Psych       Oriented x3: Yes    Mood/Affect: Normal              Dilation       Both eyes: 2.5% Phenylephrine, 1% Tropicamide @ 1:44 PM                  Slit Lamp and Fundus Exam       External Exam         Right Left    External Normal Normal              Slit Lamp Exam         Right Left    Lids/Lashes dermatochalasis dermatochalasis    Conjunctiva/Sclera White and quiet White and quiet    Cornea nasal pterygium nasal/temp ping    Anterior Chamber Deep and quiet Deep and quiet    Iris Round and reactive Round and reactive    Lens tr NS w/ cortical changes tr NS w/ cortical changes    Anterior Vitreous No PVD, clear, no cell No PVD, clear, no cell              Fundus Exam         Right Left    Disc sharp, no pallor sharp, no pallor    C/D Ratio 0.25 0.25    Macula flat/no heme / good foveal reflex flat/no heme / good foveal reflex    Vessels normal in caliber normal in caliber    Periphery flat, no retinal tear or detachment flat, no retinal tear or detachment                      IMAGING:  OCT, Retina - OU - Both Eyes          Right Eye  Quality was good. Findings include (Vitreomacular adhesion).     Left Eye  Quality was good. Findings include (Vitreomacular adhesion).

## 2025-04-21 ENCOUNTER — OFFICE VISIT (OUTPATIENT)
Dept: INTERNAL MEDICINE CLINIC | Facility: CLINIC | Age: 70
End: 2025-04-21
Payer: COMMERCIAL

## 2025-04-21 VITALS
DIASTOLIC BLOOD PRESSURE: 74 MMHG | SYSTOLIC BLOOD PRESSURE: 128 MMHG | TEMPERATURE: 97.7 F | OXYGEN SATURATION: 97 % | HEART RATE: 74 BPM | WEIGHT: 148 LBS | BODY MASS INDEX: 29.06 KG/M2 | HEIGHT: 60 IN

## 2025-04-21 DIAGNOSIS — I10 HYPERTENSION, ESSENTIAL: ICD-10-CM

## 2025-04-21 DIAGNOSIS — E78.2 MIXED HYPERLIPIDEMIA: ICD-10-CM

## 2025-04-21 DIAGNOSIS — E11.65 UNCONTROLLED TYPE 2 DIABETES MELLITUS WITH HYPERGLYCEMIA (HCC): Primary | ICD-10-CM

## 2025-04-21 PROCEDURE — 99214 OFFICE O/P EST MOD 30 MIN: CPT | Performed by: INTERNAL MEDICINE

## 2025-04-21 PROCEDURE — G2211 COMPLEX E/M VISIT ADD ON: HCPCS | Performed by: INTERNAL MEDICINE

## 2025-04-21 RX ORDER — GLIMEPIRIDE 1 MG/1
1 TABLET ORAL
Qty: 90 TABLET | Refills: 1 | Status: SHIPPED | OUTPATIENT
Start: 2025-04-21 | End: 2025-10-18

## 2025-04-21 NOTE — PROGRESS NOTES
Assessment/Plan:             1. Uncontrolled type 2 diabetes mellitus with hyperglycemia (HCC)  Comments:  Januvial costly, advised her to start Amaryl, stop Januvia  Orders:  -     glimepiride (AMARYL) 1 mg tablet; Take 1 tablet (1 mg total) by mouth daily with breakfast  2. Mixed hyperlipidemia  Comments:  continue same med  3. Hypertension, essential  Comments:  continue same med         Subjective:      Patient ID: Rosmery Angulo is a 69 y.o. female.    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), Dysthymic disorder, Esophageal reflux, Essential (primary) hypertension, Fibromyalgia, Gastroesophageal reflux disease, Hypercholesterolemia, Hyperlipidemia, Hypertension, essential, Influenza vaccination declined, Obstructive sleep apnea (adult) (pediatric), Osteoporosis, 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HCC), Other specified disorders of white blood cells, Pneumococcal vaccination declined, Psoriatic arthropathy (HCC), Rheumatoid arthritis (HCC), 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  She   Patient Active Problem List    Diagnosis Date Noted   • DDD (degenerative disc disease), cervical 05/13/2024   • Degenerative disc disease, lumbar 05/13/2024   • Tracheitis 12/05/2022   • Depression, recurrent (HCC) 07/18/2022   • Otalgia 02/03/2022   • Trigeminal neuralgia of left side of face 01/20/2022   • Primary osteoarthritis of both knees 09/16/2021   • Smoking 09/09/2021   • Arthritis 09/09/2021   • Body mass index 31.0-31.9, adult 01/14/2021   • Pneumococcal vaccination declined    • Influenza  vaccination declined    • Uncontrolled type 2 diabetes mellitus with hyperglycemia (HCC)    • 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HCC)    • 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HCC)    • Obstructive sleep apnea syndrome    • Hypertension, essential    • Mixed hyperlipidemia    • Gastroesophageal reflux disease    • Dysthymic disorder    • Osteoporosis    • Diabetes mellitus due to underlying condition with diabetic nephropathy, without long-term current use of insulin (HCC)      She  has a past surgical history that includes Hysterectomy; Cholecystectomy; Colonoscopy (01/12/2012); and Mammo (historical) (02/04/2016).  Her family history includes Breast cancer in her cousin; Breast cancer (age of onset: 58) in her sister; Cancer in her sister; Diabetes in her mother; No Known Problems in her maternal aunt, maternal aunt, maternal aunt, maternal aunt, maternal aunt, maternal aunt, maternal grandfather, maternal grandmother, paternal aunt, paternal grandfather, and paternal grandmother; Stroke in her father.  She  reports that she has been smoking cigarettes. She started smoking about 52 years ago. She has a 26.2 pack-year smoking history. She has never been exposed to tobacco smoke. She has quit using smokeless tobacco. She reports that she does not drink alcohol and does not use drugs.  Current Outpatient Medications   Medication Sig Dispense Refill   • aspirin (ECOTRIN LOW STRENGTH) 81 mg EC tablet Take 81 mg by mouth daily     • benzonatate (TESSALON) 200 MG capsule Take 1 capsule (200 mg total) by mouth 3 (three) times a day as needed for cough 20 capsule 0   • Cyanocobalamin (B-12) 1000 MCG SUBL Place 1 tablet (1,000 mcg total) under the tongue in the morning 100 tablet 1   • glimepiride (AMARYL) 1 mg tablet Take 1 tablet (1 mg total) by mouth daily with breakfast 90 tablet 1   • metFORMIN (GLUCOPHAGE) 1000 MG tablet Take 1 tablet (1,000 mg total) by mouth 2 (two) times a day with meals 180 tablet 1   •  olmesartan (BENICAR) 40 mg tablet Take 0.5 tablets (20 mg total) by mouth 2 (two) times a day 90 tablet 2   • rosuvastatin (CRESTOR) 5 mg tablet Take 1 tablet (5 mg total) by mouth daily 90 tablet 3   • tiZANidine (ZANAFLEX) 2 mg tablet Take 1 tablet (2 mg total) by mouth every 8 (eight) hours as needed for muscle spasms 30 tablet 0   • Continuous Glucose Sensor (FreeStyle Remigio 3 Sensor) MISC Use 1 each every 14 (fourteen) days (Patient not taking: Reported on 12/16/2024) 6 each 3   • lidocaine (LIDODERM) 5 % Place 1 patch on the skin every 24 hours (Patient not taking: Reported on 12/16/2024)     • naproxen (NAPROSYN) 500 mg tablet TAKE 1 TABLET BY MOUTH TWICE A DAY WITH MEALS (Patient not taking: Reported on 12/16/2024) 20 tablet 0     No current facility-administered medications for this visit.     Current Outpatient Medications on File Prior to Visit   Medication Sig   • aspirin (ECOTRIN LOW STRENGTH) 81 mg EC tablet Take 81 mg by mouth daily   • benzonatate (TESSALON) 200 MG capsule Take 1 capsule (200 mg total) by mouth 3 (three) times a day as needed for cough   • Cyanocobalamin (B-12) 1000 MCG SUBL Place 1 tablet (1,000 mcg total) under the tongue in the morning   • metFORMIN (GLUCOPHAGE) 1000 MG tablet Take 1 tablet (1,000 mg total) by mouth 2 (two) times a day with meals   • olmesartan (BENICAR) 40 mg tablet Take 0.5 tablets (20 mg total) by mouth 2 (two) times a day   • rosuvastatin (CRESTOR) 5 mg tablet Take 1 tablet (5 mg total) by mouth daily   • tiZANidine (ZANAFLEX) 2 mg tablet Take 1 tablet (2 mg total) by mouth every 8 (eight) hours as needed for muscle spasms   • [DISCONTINUED] sitaGLIPtin (JANUVIA) 100 mg tablet Take 1 tablet (100 mg total) by mouth daily   • Continuous Glucose Sensor (FreeStyle Remigio 3 Sensor) MISC Use 1 each every 14 (fourteen) days (Patient not taking: Reported on 12/16/2024)   • lidocaine (LIDODERM) 5 % Place 1 patch on the skin every 24 hours (Patient not taking: Reported  on 12/16/2024)   • naproxen (NAPROSYN) 500 mg tablet TAKE 1 TABLET BY MOUTH TWICE A DAY WITH MEALS (Patient not taking: Reported on 12/16/2024)     No current facility-administered medications on file prior to visit.     She is allergic to nuts - food allergy, atorvastatin, ezetimibe, latex, lisinopril, methotrexate, other, penicillins, pravastatin, and shellfish allergy - food allergy..    Review of Systems   Constitutional:  Negative for chills and fever.   HENT:  Negative for congestion, ear pain and sore throat.    Eyes:  Negative for pain.   Respiratory:  Negative for cough and shortness of breath.    Cardiovascular:  Negative for chest pain and leg swelling.   Gastrointestinal:  Negative for abdominal pain, nausea and vomiting.   Endocrine: Negative for polyuria.   Genitourinary:  Negative for difficulty urinating, frequency and urgency.   Musculoskeletal:  Negative for arthralgias and back pain.   Skin:  Negative for rash.   Neurological:  Negative for weakness and headaches.   Psychiatric/Behavioral:  Negative for sleep disturbance. The patient is not nervous/anxious.          Objective:      /74 (BP Location: Left arm, Patient Position: Sitting, Cuff Size: Standard)   Pulse 74   Temp 97.7 °F (36.5 °C) (Temporal)   Ht 5' (1.524 m)   Wt 67.1 kg (148 lb)   SpO2 97%   BMI 28.90 kg/m²     Recent Results (from the past 8 weeks)   POCT rapid flu A and B    Collection Time: 03/05/25 11:41 AM   Result Value Ref Range    RAPID FLU A neg     RAPID FLU B neg    POCT Rapid Covid Ag    Collection Time: 03/05/25 11:41 AM   Result Value Ref Range    POCT SARS-CoV-2 Ag Negative Negative    VALID CONTROL Valid    POCT rapid flu A and B    Collection Time: 03/05/25 12:05 PM   Result Value Ref Range    RAPID FLU A neg     RAPID FLU B neg         Physical Exam  Constitutional:       Appearance: Normal appearance.   HENT:      Head: Normocephalic.      Right Ear: External ear normal.      Left Ear: External ear  normal.      Nose: Nose normal. No congestion.      Mouth/Throat:      Mouth: Mucous membranes are moist.      Pharynx: Oropharynx is clear. No oropharyngeal exudate or posterior oropharyngeal erythema.   Eyes:      Extraocular Movements: Extraocular movements intact.      Conjunctiva/sclera: Conjunctivae normal.   Cardiovascular:      Rate and Rhythm: Normal rate and regular rhythm.      Heart sounds: Normal heart sounds. No murmur heard.  Pulmonary:      Effort: Pulmonary effort is normal.      Breath sounds: Normal breath sounds. No wheezing or rales.   Abdominal:      General: Abdomen is flat. There is no distension.      Palpations: Abdomen is soft.      Tenderness: There is no abdominal tenderness.   Musculoskeletal:      Cervical back: Normal range of motion and neck supple.      Right lower leg: No edema.      Left lower leg: No edema.   Lymphadenopathy:      Cervical: No cervical adenopathy.   Skin:     General: Skin is warm.   Neurological:      General: No focal deficit present.      Mental Status: She is alert and oriented to person, place, and time.

## 2025-06-09 ENCOUNTER — TELEPHONE (OUTPATIENT)
Age: 70
End: 2025-06-09

## 2025-06-09 DIAGNOSIS — E53.8 B12 DEFICIENCY: Primary | ICD-10-CM

## 2025-06-09 DIAGNOSIS — E11.65 UNCONTROLLED TYPE 2 DIABETES MELLITUS WITH HYPERGLYCEMIA (HCC): ICD-10-CM

## 2025-06-09 NOTE — TELEPHONE ENCOUNTER
Pt went to lab 6/9/25 ahead of her appt for f/u on 6/11/25 and was told there were no orders in her chart. Pt stated PCP wanted her to have BW done for f/u appt. Pt cx appt for 6/11/25 and is resched for 7/2/25. Please call pt when orders are placed.

## 2025-06-25 ENCOUNTER — APPOINTMENT (OUTPATIENT)
Dept: LAB | Facility: CLINIC | Age: 70
End: 2025-06-25
Attending: INTERNAL MEDICINE
Payer: COMMERCIAL

## 2025-06-25 DIAGNOSIS — E11.65 UNCONTROLLED TYPE 2 DIABETES MELLITUS WITH HYPERGLYCEMIA (HCC): ICD-10-CM

## 2025-06-25 DIAGNOSIS — E53.8 B12 DEFICIENCY: ICD-10-CM

## 2025-06-25 LAB
ALBUMIN SERPL BCG-MCNC: 4.3 G/DL (ref 3.5–5)
ALP SERPL-CCNC: 70 U/L (ref 34–104)
ALT SERPL W P-5'-P-CCNC: 18 U/L (ref 7–52)
ANION GAP SERPL CALCULATED.3IONS-SCNC: 9 MMOL/L (ref 4–13)
AST SERPL W P-5'-P-CCNC: 14 U/L (ref 13–39)
BASOPHILS # BLD AUTO: 0.04 THOUSANDS/ÂΜL (ref 0–0.1)
BASOPHILS NFR BLD AUTO: 1 % (ref 0–1)
BILIRUB SERPL-MCNC: 0.22 MG/DL (ref 0.2–1)
BUN SERPL-MCNC: 20 MG/DL (ref 5–25)
CALCIUM SERPL-MCNC: 9.4 MG/DL (ref 8.4–10.2)
CHLORIDE SERPL-SCNC: 108 MMOL/L (ref 96–108)
CHOLEST SERPL-MCNC: 151 MG/DL (ref ?–200)
CO2 SERPL-SCNC: 22 MMOL/L (ref 21–32)
CREAT SERPL-MCNC: 0.8 MG/DL (ref 0.6–1.3)
CREAT UR-MCNC: 90.8 MG/DL
EOSINOPHIL # BLD AUTO: 0.18 THOUSAND/ÂΜL (ref 0–0.61)
EOSINOPHIL NFR BLD AUTO: 3 % (ref 0–6)
ERYTHROCYTE [DISTWIDTH] IN BLOOD BY AUTOMATED COUNT: 12.9 % (ref 11.6–15.1)
EST. AVERAGE GLUCOSE BLD GHB EST-MCNC: 183 MG/DL
GFR SERPL CREATININE-BSD FRML MDRD: 74 ML/MIN/1.73SQ M
GLUCOSE P FAST SERPL-MCNC: 154 MG/DL (ref 65–99)
HBA1C MFR BLD: 8 %
HCT VFR BLD AUTO: 46.2 % (ref 34.8–46.1)
HDLC SERPL-MCNC: 25 MG/DL
HGB BLD-MCNC: 15.2 G/DL (ref 11.5–15.4)
IMM GRANULOCYTES # BLD AUTO: 0.01 THOUSAND/UL (ref 0–0.2)
IMM GRANULOCYTES NFR BLD AUTO: 0 % (ref 0–2)
LDLC SERPL DIRECT ASSAY-MCNC: 66 MG/DL (ref 0–100)
LYMPHOCYTES # BLD AUTO: 2.48 THOUSANDS/ÂΜL (ref 0.6–4.47)
LYMPHOCYTES NFR BLD AUTO: 42 % (ref 14–44)
MCH RBC QN AUTO: 30.8 PG (ref 26.8–34.3)
MCHC RBC AUTO-ENTMCNC: 32.9 G/DL (ref 31.4–37.4)
MCV RBC AUTO: 94 FL (ref 82–98)
MICROALBUMIN UR-MCNC: 8 MG/L
MICROALBUMIN/CREAT 24H UR: 9 MG/G CREATININE (ref 0–30)
MONOCYTES # BLD AUTO: 0.49 THOUSAND/ÂΜL (ref 0.17–1.22)
MONOCYTES NFR BLD AUTO: 8 % (ref 4–12)
NEUTROPHILS # BLD AUTO: 2.7 THOUSANDS/ÂΜL (ref 1.85–7.62)
NEUTS SEG NFR BLD AUTO: 46 % (ref 43–75)
NRBC BLD AUTO-RTO: 0 /100 WBCS
PLATELET # BLD AUTO: 161 THOUSANDS/UL (ref 149–390)
PMV BLD AUTO: 10.5 FL (ref 8.9–12.7)
POTASSIUM SERPL-SCNC: 4.4 MMOL/L (ref 3.5–5.3)
PROT SERPL-MCNC: 7.1 G/DL (ref 6.4–8.4)
RBC # BLD AUTO: 4.94 MILLION/UL (ref 3.81–5.12)
SODIUM SERPL-SCNC: 139 MMOL/L (ref 135–147)
TRIGL SERPL-MCNC: 509 MG/DL (ref ?–150)
TSH SERPL DL<=0.05 MIU/L-ACNC: 2.9 UIU/ML (ref 0.45–4.5)
VIT B12 SERPL-MCNC: 1137 PG/ML (ref 180–914)
WBC # BLD AUTO: 5.9 THOUSAND/UL (ref 4.31–10.16)

## 2025-06-25 PROCEDURE — 85025 COMPLETE CBC W/AUTO DIFF WBC: CPT

## 2025-06-25 PROCEDURE — 80061 LIPID PANEL: CPT

## 2025-06-25 PROCEDURE — 82043 UR ALBUMIN QUANTITATIVE: CPT

## 2025-06-25 PROCEDURE — 36415 COLL VENOUS BLD VENIPUNCTURE: CPT

## 2025-06-25 PROCEDURE — 84443 ASSAY THYROID STIM HORMONE: CPT

## 2025-06-25 PROCEDURE — 82607 VITAMIN B-12: CPT

## 2025-06-25 PROCEDURE — 83036 HEMOGLOBIN GLYCOSYLATED A1C: CPT

## 2025-06-25 PROCEDURE — 80053 COMPREHEN METABOLIC PANEL: CPT

## 2025-06-25 PROCEDURE — 83721 ASSAY OF BLOOD LIPOPROTEIN: CPT

## 2025-06-25 PROCEDURE — 82570 ASSAY OF URINE CREATININE: CPT

## 2025-07-02 ENCOUNTER — OFFICE VISIT (OUTPATIENT)
Dept: INTERNAL MEDICINE CLINIC | Facility: CLINIC | Age: 70
End: 2025-07-02
Payer: COMMERCIAL

## 2025-07-02 VITALS
HEIGHT: 60 IN | HEART RATE: 75 BPM | BODY MASS INDEX: 30.63 KG/M2 | SYSTOLIC BLOOD PRESSURE: 124 MMHG | WEIGHT: 156 LBS | TEMPERATURE: 98.5 F | OXYGEN SATURATION: 97 % | DIASTOLIC BLOOD PRESSURE: 72 MMHG

## 2025-07-02 DIAGNOSIS — E78.2 MIXED HYPERLIPIDEMIA: ICD-10-CM

## 2025-07-02 DIAGNOSIS — E11.65 UNCONTROLLED TYPE 2 DIABETES MELLITUS WITH HYPERGLYCEMIA (HCC): ICD-10-CM

## 2025-07-02 DIAGNOSIS — Z12.31 ENCOUNTER FOR SCREENING MAMMOGRAM FOR BREAST CANCER: ICD-10-CM

## 2025-07-02 DIAGNOSIS — I10 HYPERTENSION, ESSENTIAL: ICD-10-CM

## 2025-07-02 DIAGNOSIS — E11.65 UNCONTROLLED TYPE 2 DIABETES MELLITUS WITH HYPERGLYCEMIA (HCC): Primary | ICD-10-CM

## 2025-07-02 PROCEDURE — 99214 OFFICE O/P EST MOD 30 MIN: CPT | Performed by: INTERNAL MEDICINE

## 2025-07-02 PROCEDURE — G2211 COMPLEX E/M VISIT ADD ON: HCPCS | Performed by: INTERNAL MEDICINE

## 2025-07-02 RX ORDER — OLMESARTAN MEDOXOMIL 40 MG/1
20 TABLET ORAL 2 TIMES DAILY
Qty: 90 TABLET | Refills: 2 | Status: SHIPPED | OUTPATIENT
Start: 2025-07-02

## 2025-07-02 RX ORDER — GLIMEPIRIDE 1 MG/1
1 TABLET ORAL
Qty: 90 TABLET | Refills: 1 | Status: SHIPPED | OUTPATIENT
Start: 2025-07-02 | End: 2025-12-29

## 2025-07-02 RX ORDER — ROSUVASTATIN CALCIUM 5 MG/1
5 TABLET, COATED ORAL DAILY
Qty: 90 TABLET | Refills: 3 | Status: SHIPPED | OUTPATIENT
Start: 2025-07-02

## 2025-07-02 NOTE — PROGRESS NOTES
Name: Rosmery Angulo      : 1955      MRN: [id number]  Encounter Provider: Yves Ramirez MD  Encounter Date: 2025   Encounter department: [facility] INTERNAL MEDICINE  :  Assessment & Plan  Uncontrolled type 2 diabetes mellitus with hyperglycemia (HCC)    Lab Results   Component Value Date    HGBA1C 8.0 (H) 2025   Continue same med  Orders:  •  glimepiride (AMARYL) 1 mg tablet; Take 1 tablet (1 mg total) by mouth daily with breakfast  •  metFORMIN (GLUCOPHAGE) 1000 MG tablet; Take 1 tablet (1,000 mg total) by mouth 2 (two) times a day with meals  •  Hemoglobin A1C; Future    Encounter for screening mammogram for breast cancer    Orders:  •  Mammo screening bilateral w 3d and cad; Future    Hypertension, essential  Continue same med  Orders:  •  olmesartan (BENICAR) 40 mg tablet; Take 0.5 tablets (20 mg total) by mouth 2 (two) times a day    Mixed hyperlipidemia  Continue same med  Orders:  •  rosuvastatin (CRESTOR) 5 mg tablet; Take 1 tablet (5 mg total) by mouth daily  •  Lipid Panel with Direct LDL reflex; Future    Uncontrolled type 2 diabetes mellitus with hyperglycemia (HCC)    Lab Results   Component Value Date    HGBA1C 8.0 (H) 2025   Continue same med  Orders:  •  glimepiride (AMARYL) 1 mg tablet; Take 1 tablet (1 mg total) by mouth daily with breakfast  •  metFORMIN (GLUCOPHAGE) 1000 MG tablet; Take 1 tablet (1,000 mg total) by mouth 2 (two) times a day with meals  •  Hemoglobin A1C; Future    Uncontrolled type 2 diabetes mellitus with hyperglycemia (HCC)    Lab Results   Component Value Date    HGBA1C 8.0 (H) 2025   Continue same med  Orders:  •  glimepiride (AMARYL) 1 mg tablet; Take 1 tablet (1 mg total) by mouth daily with breakfast  •  metFORMIN (GLUCOPHAGE) 1000 MG tablet; Take 1 tablet (1,000 mg total) by mouth 2 (two) times a day with meals  •  Hemoglobin A1C; Future    Hypertension, essential  Continue same med  Orders:  •  olmesartan (BENICAR) 40 mg  tablet; Take 0.5 tablets (20 mg total) by mouth 2 (two) times a day    Mixed hyperlipidemia  Continue same med  Orders:  •  rosuvastatin (CRESTOR) 5 mg tablet; Take 1 tablet (5 mg total) by mouth daily  •  Lipid Panel with Direct LDL reflex; Future           History of Present Illness   Follow-up on blood and urine test done on 6/25/2025 test discussed with her      Review of Systems   Constitutional:  Negative for chills and fever.   HENT:  Negative for congestion, ear pain and sore throat.    Eyes:  Negative for pain.   Respiratory:  Negative for cough and shortness of breath.    Cardiovascular:  Negative for chest pain and leg swelling.   Gastrointestinal:  Negative for abdominal pain, nausea and vomiting.   Endocrine: Negative for polyuria.   Genitourinary:  Negative for difficulty urinating, frequency and urgency.   Musculoskeletal:  Positive for arthralgias. Negative for back pain.   Skin:  Negative for rash.   Neurological:  Negative for weakness and headaches.   Psychiatric/Behavioral:  Negative for sleep disturbance. The patient is not nervous/anxious.        Objective   /72 (BP Location: Left arm, Patient Position: Sitting, Cuff Size: Standard)   Pulse 75   Temp 98.5 °F (36.9 °C) (Temporal)   Ht 5' (1.524 m)   Wt 70.8 kg (156 lb)   SpO2 97%   BMI 30.47 kg/m²      Physical Exam  Vitals and nursing note reviewed.   Constitutional:       General: She is not in acute distress.     Appearance: She is well-developed.   HENT:      Head: Normocephalic and atraumatic.      Right Ear: External ear normal.      Left Ear: External ear normal.      Mouth/Throat:      Mouth: Mucous membranes are moist.      Pharynx: Oropharynx is clear.     Eyes:      Extraocular Movements: Extraocular movements intact.      Conjunctiva/sclera: Conjunctivae normal.       Cardiovascular:      Rate and Rhythm: Normal rate and regular rhythm.      Heart sounds: Normal heart sounds. No murmur heard.  Pulmonary:      Effort:  Pulmonary effort is normal. No respiratory distress.      Breath sounds: Normal breath sounds. No wheezing or rales.   Abdominal:      General: Abdomen is flat. There is no distension.      Palpations: Abdomen is soft.      Tenderness: There is no abdominal tenderness.     Musculoskeletal:         General: No swelling.      Cervical back: Neck supple.      Right lower leg: No edema.      Left lower leg: No edema.     Skin:     General: Skin is warm and dry.      Capillary Refill: Capillary refill takes less than 2 seconds.     Neurological:      General: No focal deficit present.      Mental Status: She is alert and oriented to person, place, and time.     Psychiatric:         Mood and Affect: Mood normal.

## 2025-07-02 NOTE — ASSESSMENT & PLAN NOTE
Continue same med  Orders:  •  olmesartan (BENICAR) 40 mg tablet; Take 0.5 tablets (20 mg total) by mouth 2 (two) times a day

## 2025-07-02 NOTE — ASSESSMENT & PLAN NOTE
Lab Results   Component Value Date    HGBA1C 8.0 (H) 06/25/2025   Continue same med  Orders:  •  glimepiride (AMARYL) 1 mg tablet; Take 1 tablet (1 mg total) by mouth daily with breakfast  •  metFORMIN (GLUCOPHAGE) 1000 MG tablet; Take 1 tablet (1,000 mg total) by mouth 2 (two) times a day with meals  •  Hemoglobin A1C; Future

## 2025-07-02 NOTE — ASSESSMENT & PLAN NOTE
Continue same med  Orders:  •  rosuvastatin (CRESTOR) 5 mg tablet; Take 1 tablet (5 mg total) by mouth daily  •  Lipid Panel with Direct LDL reflex; Future

## 2025-08-13 ENCOUNTER — APPOINTMENT (OUTPATIENT)
Dept: LAB | Facility: CLINIC | Age: 70
End: 2025-08-13
Attending: INTERNAL MEDICINE
Payer: COMMERCIAL

## 2025-08-13 DIAGNOSIS — E78.2 MIXED HYPERLIPIDEMIA: ICD-10-CM

## 2025-08-13 DIAGNOSIS — E11.65 UNCONTROLLED TYPE 2 DIABETES MELLITUS WITH HYPERGLYCEMIA (HCC): ICD-10-CM

## 2025-08-13 LAB
CHOLEST SERPL-MCNC: 114 MG/DL (ref ?–200)
EST. AVERAGE GLUCOSE BLD GHB EST-MCNC: 166 MG/DL
HBA1C MFR BLD: 7.4 %
HDLC SERPL-MCNC: 31 MG/DL
LDLC SERPL CALC-MCNC: 51 MG/DL (ref 0–100)
TRIGL SERPL-MCNC: 159 MG/DL (ref ?–150)

## 2025-08-13 PROCEDURE — 36415 COLL VENOUS BLD VENIPUNCTURE: CPT

## 2025-08-13 PROCEDURE — 83036 HEMOGLOBIN GLYCOSYLATED A1C: CPT

## 2025-08-13 PROCEDURE — 80061 LIPID PANEL: CPT

## 2025-08-22 ENCOUNTER — OFFICE VISIT (OUTPATIENT)
Dept: INTERNAL MEDICINE CLINIC | Facility: CLINIC | Age: 70
End: 2025-08-22

## 2025-08-22 VITALS
OXYGEN SATURATION: 99 % | HEIGHT: 60 IN | HEART RATE: 74 BPM | TEMPERATURE: 98.4 F | BODY MASS INDEX: 30.04 KG/M2 | DIASTOLIC BLOOD PRESSURE: 78 MMHG | SYSTOLIC BLOOD PRESSURE: 130 MMHG | WEIGHT: 153 LBS

## 2025-08-22 DIAGNOSIS — E78.2 MIXED HYPERLIPIDEMIA: ICD-10-CM

## 2025-08-22 DIAGNOSIS — I10 HYPERTENSION, ESSENTIAL: ICD-10-CM

## 2025-08-22 DIAGNOSIS — E11.65 UNCONTROLLED TYPE 2 DIABETES MELLITUS WITH HYPERGLYCEMIA (HCC): ICD-10-CM

## 2025-08-22 RX ORDER — OLMESARTAN MEDOXOMIL 40 MG/1
20 TABLET ORAL 2 TIMES DAILY
Qty: 90 TABLET | Refills: 2 | Status: SHIPPED | OUTPATIENT
Start: 2025-08-22

## 2025-08-22 RX ORDER — ROSUVASTATIN CALCIUM 5 MG/1
5 TABLET, COATED ORAL DAILY
Qty: 90 TABLET | Refills: 3 | Status: SHIPPED | OUTPATIENT
Start: 2025-08-22

## 2025-08-22 RX ORDER — GLIMEPIRIDE 1 MG/1
1 TABLET ORAL
Qty: 90 TABLET | Refills: 1 | Status: SHIPPED | OUTPATIENT
Start: 2025-08-22 | End: 2026-02-18